# Patient Record
Sex: MALE | Race: WHITE | NOT HISPANIC OR LATINO | Employment: PART TIME | URBAN - METROPOLITAN AREA
[De-identification: names, ages, dates, MRNs, and addresses within clinical notes are randomized per-mention and may not be internally consistent; named-entity substitution may affect disease eponyms.]

---

## 2017-01-25 ENCOUNTER — OFFICE VISIT (OUTPATIENT)
Dept: NEUROSURGERY | Facility: CLINIC | Age: 77
End: 2017-01-25

## 2017-01-25 ENCOUNTER — TRANSCRIBE ORDERS (OUTPATIENT)
Dept: NEUROSURGERY | Facility: CLINIC | Age: 77
End: 2017-01-25

## 2017-01-25 VITALS
DIASTOLIC BLOOD PRESSURE: 65 MMHG | WEIGHT: 170 LBS | HEART RATE: 64 BPM | SYSTOLIC BLOOD PRESSURE: 113 MMHG | HEIGHT: 70 IN | BODY MASS INDEX: 24.34 KG/M2

## 2017-01-25 DIAGNOSIS — M50.320 DEGENERATION OF INTERVERTEBRAL DISC OF MID-CERVICAL REGION, UNSPECIFIED SPINAL LEVEL: ICD-10-CM

## 2017-01-25 DIAGNOSIS — M50.120 CERVICAL DISC DISORDER WITH RADICULOPATHY OF MID-CERVICAL REGION: Primary | ICD-10-CM

## 2017-01-25 PROCEDURE — 99214 OFFICE O/P EST MOD 30 MIN: CPT | Performed by: NEUROLOGICAL SURGERY

## 2017-01-25 NOTE — MR AVS SNAPSHOT
Urban Teixeira   1/25/2017 12:30 PM   Office Visit    Dept Phone:  [phone]   Encounter #:  [id number]    Provider:  Obed Youssef MD   Department:  [facility] NEUROSURGERY                Your Full Care Plan              Your Updated Medication List          This list is accurate as of: 1/25/17  3:32 PM.  Always use your most recent med list.                aspirin 81 MG EC tablet       atorvastatin 80 MG tablet   Commonly known as:  LIPITOR       carvedilol 12.5 MG tablet   Commonly known as:  COREG       FARXIGA 10 MG tablet   Generic drug:  Dapagliflozin Propanediol       gabapentin 300 MG capsule   Commonly known as:  NEURONTIN   Take 1 capsule by mouth 2 (two) times a day.       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 If you would like to sign up for [place], please email [email or web address] or call [phone] to obtain an activation code.             Other Info from Your Visit           Your Appointments     Feb  "13, 2017  8:00 AM EST   Pre-Admission Testing with TELLO COKER 3   [facility] PREADMISSION T ([place])    [address] [facility] [address] [phone]            Mar 06, 2017  9:30 AM EST   Post-Op with SARY Brink   [facility] (--)    [address]  [facility] [address] [phone]              Allergies     Sulfa Antibiotics        Reason for Visit     Neck Pain           Vital Signs     Blood Pressure Pulse Height Weight Body Mass Index Smoking Status    113/65 64 70\" (177.8 cm) 170 lb (77.1 kg) 24.39 kg/m2 Never Smoker      Problems and Diagnoses Noted     Disc disease of the neck    Degeneration of intervertebral disc of cervical region        "

## 2017-01-25 NOTE — LETTER
January 25, 2017     Rogelio Dyer MD  [address] [place] [address]    Patient: Urban Teixeira   YOB: 1940   Date of Visit: 1/25/2017       Dear Dr. Gomez MD:    Urban Teixeira was in my office today. Below are the relevant portions of my assessment and plan of care.      Independent Review of Radiographic Studies:    The myelogram done in August 2016 showed a osteophytic cervical disc disease at C4-C5 and C5-C6 with root entrapment.  I agree with the report.      Medical Decision Making:    I described and recommended an anterior cervical discectomy and fusion at C4/5 and C5/6 with a cage and plate. The goal of surgery is relief of radiating arm pain and improvement of numbness, tingling, and weakness. This will help reduce but may not eliminate midline neck pain. The risks include, but are not limited to, infection, hemorrhage requiring transfusion or reoperation, CSF leak requiring reoperation, incomplete relief of symptoms, difficulty swallowing, hoarseness of voice, hardware problems requiring revision surgery, potential need for additional surgery in the future, stroke, paralysis, coma, and death. The patient agrees to proceed.  He will need to come off of his aspirin.  He will need cardiac clearance.      Urban was seen today for neck pain.    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            If you have questions, please do not hesitate to call me. I look forward to following Urban along with you.         Sincerely,        Obed Youssef MD        CC: No Recipients

## 2017-01-25 NOTE — PROGRESS NOTES
Subjective   Patient ID: Urban Teixeira is a 76 y.o. male is here today for follow-up of neck pain.    At his last visit, it was recommended that the patient continue gabapentin 300 mg BID and continue his home exercise program.    Today, the patient notes that his neck pain symptoms have not improved.  He notes that he was evaluated and treated by a chiropractor and notes that his neck and left arm pain symptoms did not improve at all.  He notes the gabapentin helps minimally.  He notes that his pain is increased with prolonged sitting and at night.  He also notes increased weakness in his left hand and notes that it is even causing him problems eating.    He does note that Biofreeze does give him some relief of his pain symptoms.     Neck Pain    This is a chronic problem. The current episode started more than 1 month ago. The problem occurs daily. The problem has been gradually improving. Pertinent negatives include no fever.       The following portions of the patient's history were reviewed and updated as appropriate: allergies, current medications, past family history, past medical history, past social history, past surgical history and problem list.    Review of Systems   Constitutional: Negative for fever.   Musculoskeletal: Positive for neck pain.   Neurological: Negative for syncope.   All other systems reviewed and are negative.    We have been treating this gentleman over the last several years for neck pain and left arm pain.  He now has left deltoid weakness.  Blocks and physical therapy and gabapentin have not helped.  His hemoglobin A1c is better.  He does have a cardiac history but has been stable.  He has cervical osteophytic root impingement at C4-C5 and C5-C6.  The other levels don't look so bad.  We've exhausted all reasonable treatment is conservative.  He does have some neck pain but it's mostly the left shoulder arm and deltoid are bothering him.  Plus it is weak.  I discussed moving forward  with an ACDF at C4-C5 and C5-C6.  He will need cardiac clearance.      Objective   Physical Exam   Constitutional: He is oriented to person, place, and time. He appears well-developed and well-nourished.   HENT:   Head: Normocephalic and atraumatic.   Eyes: Conjunctivae and EOM are normal. Pupils are equal, round, and reactive to light.   Fundoscopic exam:       The right eye shows no papilledema. The right eye shows venous pulsations.        The left eye shows no papilledema. The left eye shows venous pulsations.   Neck: Carotid bruit is not present.   Neurological: He is oriented to person, place, and time. He has a normal Finger-Nose-Finger Test and a normal Heel to Shin Test. Gait normal.   Reflex Scores:       Tricep reflexes are 2+ on the right side and 2+ on the left side.       Bicep reflexes are 2+ on the right side and 2+ on the left side.       Brachioradialis reflexes are 2+ on the right side and 2+ on the left side.       Patellar reflexes are 2+ on the right side and 2+ on the left side.       Achilles reflexes are 2+ on the right side and 2+ on the left side.  Psychiatric: His speech is normal.     Neurologic Exam     Mental Status   Oriented to person, place, and time.   Registration of memory: Good recent and remote memory.   Attention: normal. Concentration: normal.   Speech: speech is normal   Level of consciousness: alert  Knowledge: consistent with education.     Cranial Nerves     CN II   Visual fields full to confrontation.   Visual acuity: normal    CN III, IV, VI   Pupils are equal, round, and reactive to light.  Extraocular motions are normal.     CN V   Facial sensation intact.   Right corneal reflex: normal  Left corneal reflex: normal    CN VII   Facial expression full, symmetric.   Right facial weakness: none  Left facial weakness: none    CN VIII   Hearing: intact    CN IX, X   Palate: symmetric    CN XI   Right sternocleidomastoid strength: normal  Left sternocleidomastoid strength:  normal    CN XII   Tongue: not atrophic  Tongue deviation: none    Motor Exam   Muscle bulk: normal  Right arm tone: normal  Left arm tone: normal  Right leg tone: normal  Left leg tone: normal    Strength   Strength 5/5 except as noted.   Left deltoid: 4/5    Sensory Exam   Light touch normal.     Gait, Coordination, and Reflexes     Gait  Gait: normal    Coordination   Finger to nose coordination: normal  Heel to shin coordination: normal    Reflexes   Right brachioradialis: 2+  Left brachioradialis: 2+  Right biceps: 2+  Left biceps: 2+  Right triceps: 2+  Left triceps: 2+  Right patellar: 2+  Left patellar: 2+  Right achilles: 2+  Left achilles: 2+  Right : 2+  Left : 2+      Assessment/Plan   Independent Review of Radiographic Studies:    The myelogram done in August 2016 showed a osteophytic cervical disc disease at C4-C5 and C5-C6 with root entrapment.  I agree with the report.      Medical Decision Making:    I described and recommended an anterior cervical discectomy and fusion at C4/5 and C5/6 with a cage and plate. The goal of surgery is relief of radiating arm pain and improvement of numbness, tingling, and weakness. This will help reduce but may not eliminate midline neck pain. The risks include, but are not limited to, infection, hemorrhage requiring transfusion or reoperation, CSF leak requiring reoperation, incomplete relief of symptoms, difficulty swallowing, hoarseness of voice, hardware problems requiring revision surgery, potential need for additional surgery in the future, stroke, paralysis, coma, and death. The patient agrees to proceed.  He will need to come off of his aspirin.  He will need cardiac clearance.      Urban was seen today for neck pain.    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

## 2017-02-13 ENCOUNTER — HOSPITAL ENCOUNTER (OUTPATIENT)
Dept: GENERAL RADIOLOGY | Facility: HOSPITAL | Age: 77
Discharge: HOME | End: 2017-02-13
Admitting: NEUROLOGICAL SURGERY

## 2017-02-13 ENCOUNTER — APPOINTMENT (OUTPATIENT)
Dept: PREADMISSION TESTING | Facility: HOSPITAL | Age: 77
End: 2017-02-13

## 2017-02-13 VITALS
WEIGHT: 173.7 LBS | DIASTOLIC BLOOD PRESSURE: 73 MMHG | SYSTOLIC BLOOD PRESSURE: 134 MMHG | RESPIRATION RATE: 16 BRPM | TEMPERATURE: 96.9 F | BODY MASS INDEX: 24.87 KG/M2 | HEIGHT: 70 IN | OXYGEN SATURATION: 99 % | HEART RATE: 63 BPM

## 2017-02-13 LAB
ANION GAP SERPL CALCULATED.3IONS-SCNC: 12 MMOL/L
APTT PPP: 28.6 SECONDS (ref 22.7–35.4)
BASOPHILS # BLD AUTO: 0.02 10*3/MM3 (ref 0–0.2)
BASOPHILS NFR BLD AUTO: 0.4 % (ref 0–1.5)
BILIRUB UR QL STRIP: NEGATIVE
BUN BLD-MCNC: 21 MG/DL (ref 8–23)
BUN/CREAT SERPL: 19.3 (ref 7–25)
CALCIUM SPEC-SCNC: 8.8 MG/DL (ref 8.6–10.5)
CHLORIDE SERPL-SCNC: 102 MMOL/L (ref 98–107)
CLARITY UR: CLEAR
CO2 SERPL-SCNC: 26 MMOL/L (ref 22–29)
COLOR UR: YELLOW
CREAT BLD-MCNC: 1.09 MG/DL (ref 0.76–1.27)
DEPRECATED RDW RBC AUTO: 47.9 FL (ref 37–54)
EOSINOPHIL # BLD AUTO: 0.11 10*3/MM3 (ref 0–0.7)
EOSINOPHIL NFR BLD AUTO: 2.2 % (ref 0.3–6.2)
ERYTHROCYTE [DISTWIDTH] IN BLOOD BY AUTOMATED COUNT: 14 % (ref 11.5–14.5)
GFR SERPL CREATININE-BSD FRML MDRD: 66 ML/MIN/1.73
GLUCOSE BLD-MCNC: 258 MG/DL (ref 65–99)
GLUCOSE UR STRIP-MCNC: ABNORMAL MG/DL
HCT VFR BLD AUTO: 43.8 % (ref 40.4–52.2)
HGB BLD-MCNC: 13.7 G/DL (ref 13.7–17.6)
HGB UR QL STRIP.AUTO: NEGATIVE
IMM GRANULOCYTES # BLD: 0 10*3/MM3 (ref 0–0.03)
IMM GRANULOCYTES NFR BLD: 0 % (ref 0–0.5)
INR PPP: 1.13 (ref 0.9–1.1)
KETONES UR QL STRIP: NEGATIVE
LEUKOCYTE ESTERASE UR QL STRIP.AUTO: NEGATIVE
LYMPHOCYTES # BLD AUTO: 1.25 10*3/MM3 (ref 0.9–4.8)
LYMPHOCYTES NFR BLD AUTO: 24.7 % (ref 19.6–45.3)
MCH RBC QN AUTO: 29.5 PG (ref 27–32.7)
MCHC RBC AUTO-ENTMCNC: 31.3 G/DL (ref 32.6–36.4)
MCV RBC AUTO: 94.4 FL (ref 79.8–96.2)
MONOCYTES # BLD AUTO: 0.33 10*3/MM3 (ref 0.2–1.2)
MONOCYTES NFR BLD AUTO: 6.5 % (ref 5–12)
NEUTROPHILS # BLD AUTO: 3.35 10*3/MM3 (ref 1.9–8.1)
NEUTROPHILS NFR BLD AUTO: 66.2 % (ref 42.7–76)
NITRITE UR QL STRIP: NEGATIVE
PH UR STRIP.AUTO: <=5 [PH] (ref 5–8)
PLATELET # BLD AUTO: 130 10*3/MM3 (ref 140–500)
PMV BLD AUTO: 9.9 FL (ref 6–12)
POTASSIUM BLD-SCNC: 4.2 MMOL/L (ref 3.5–5.2)
PROT UR QL STRIP: NEGATIVE
PROTHROMBIN TIME: 14.1 SECONDS (ref 11.7–14.2)
RBC # BLD AUTO: 4.64 10*6/MM3 (ref 4.6–6)
SODIUM BLD-SCNC: 140 MMOL/L (ref 136–145)
SP GR UR STRIP: >=1.03 (ref 1–1.03)
UROBILINOGEN UR QL STRIP: ABNORMAL
WBC NRBC COR # BLD: 5.06 10*3/MM3 (ref 4.5–10.7)

## 2017-02-13 PROCEDURE — 80048 BASIC METABOLIC PNL TOTAL CA: CPT | Performed by: NEUROLOGICAL SURGERY

## 2017-02-13 PROCEDURE — 36415 COLL VENOUS BLD VENIPUNCTURE: CPT

## 2017-02-13 PROCEDURE — 85610 PROTHROMBIN TIME: CPT | Performed by: NEUROLOGICAL SURGERY

## 2017-02-13 PROCEDURE — 71020 HC CHEST PA AND LATERAL: CPT

## 2017-02-13 PROCEDURE — 81003 URINALYSIS AUTO W/O SCOPE: CPT | Performed by: NEUROLOGICAL SURGERY

## 2017-02-13 PROCEDURE — 85730 THROMBOPLASTIN TIME PARTIAL: CPT | Performed by: NEUROLOGICAL SURGERY

## 2017-02-13 PROCEDURE — 85025 COMPLETE CBC W/AUTO DIFF WBC: CPT | Performed by: NEUROLOGICAL SURGERY

## 2017-02-13 NOTE — DISCHARGE INSTRUCTIONS
Take the following medications the morning of surgery with a small sip of water.  CARVEDILOL    ARRIVAL TIME 12:30   PLEASE CHECK WITH RADHA TO MAKE SURE THIS TIME IS  CORRECT      General Instructions:  • Do not eat or drink after midnight: includes water, mints, or gum. You may brush your teeth.  • Do not smoke, chew tobacco, or drink alcohol.  • Bring medications in original bottles, any inhalers and if applicable your C-PAP/ BI-PAP machine.  • Bring any papers given to you in the doctor’s office.  • Wear clean comfortable clothes and socks.  • Do not wear contact lenses or make-up.  Bring a case for your glasses if applicable.   • Bring crutches or walker if applicable.  • Leave all other valuables and jewelry at home.        Preventing a Surgical Site Infection:  Shower on the morning of surgery using a fresh bar of anti-bacterial soap (such as Dial) and clean washcloth.  Dry with a clean towel and dress in clean clothing.  For 2 to 3 days before surgery, avoid shaving with a razor near where you will have surgery because the razor can irritate skin and make it easier to develop an infection  Ask your surgeon if you will be receiving antibiotics prior to surgery  Make sure you, your family, and all healthcare providers clean their hands with soap and water or an alcohol based hand  before caring for you or your wound  If at all possible, quit smoking as many days before surgery as you can.    Day of surgery:  Upon arrival, a Pre-op nurse and Anesthesiologist will review your health history, obtain vital signs, and answer questions you may have.  The only belongings needed at this time will be your home medications and if applicable your C-PAP/BI-PAP machine.  If you are staying overnight your family can leave the rest of your belongings in the car and bring them to your room later.  A Pre-op nurse will start an IV and you may receive medication in preparation for surgery, including something to  help you relax.  Your family will be able to see you in the Pre-op area.  While you are in surgery your family should notify the waiting room  if they leave the waiting room area and provide a contact phone number.    Please be aware that surgery does come with discomfort.  We want to make every effort to control your discomfort so please discuss any uncontrolled symptoms with your nurse.   Your doctor will most likely have prescribed pain medications.      If you are going home after surgery you will receive individualized written care instructions before being discharged.  A responsible adult must drive you to and from the hospital on the day of your surgery and stay with you for 24 hours.    If you are staying overnight following surgery, you will be transported to your hospital room following the recovery period.  [facility] has all private rooms.    If you have any questions please call Pre-Admission Testing at [phone].  Deductibles and co-payments are collected on the day of service. Please be prepared to pay the required co-pay, deductible or deposit on the day of service as defined by your plan.

## 2017-02-21 ENCOUNTER — HOSPITAL ENCOUNTER (OUTPATIENT)
Facility: HOSPITAL | Age: 77
Setting detail: HOSPITAL OUTPATIENT SURGERY
Discharge: HOME | End: 2017-02-21
Attending: NEUROLOGICAL SURGERY | Admitting: NEUROLOGICAL SURGERY

## 2017-02-21 ENCOUNTER — ANESTHESIA EVENT (OUTPATIENT)
Dept: PERIOP | Facility: HOSPITAL | Age: 77
End: 2017-02-21

## 2017-02-21 ENCOUNTER — ANESTHESIA (OUTPATIENT)
Dept: PERIOP | Facility: HOSPITAL | Age: 77
End: 2017-02-21

## 2017-02-21 VITALS
BODY MASS INDEX: 24.44 KG/M2 | RESPIRATION RATE: 12 BRPM | DIASTOLIC BLOOD PRESSURE: 86 MMHG | WEIGHT: 170.7 LBS | TEMPERATURE: 97.6 F | OXYGEN SATURATION: 99 % | SYSTOLIC BLOOD PRESSURE: 148 MMHG | HEART RATE: 60 BPM | HEIGHT: 70 IN

## 2017-02-21 PROBLEM — M50.121 CERVICAL DISC DISORDER AT C4-C5 LEVEL WITH RADICULOPATHY: Status: ACTIVE | Noted: 2017-02-21

## 2017-02-21 LAB — GLUCOSE BLDC GLUCOMTR-MCNC: 108 MG/DL (ref 70–130)

## 2017-02-21 PROCEDURE — 25010000002 FENTANYL CITRATE (PF) 100 MCG/2ML SOLUTION: Performed by: ANESTHESIOLOGY

## 2017-02-21 PROCEDURE — 25010000002 MIDAZOLAM PER 1 MG: Performed by: ANESTHESIOLOGY

## 2017-02-21 PROCEDURE — 82962 GLUCOSE BLOOD TEST: CPT

## 2017-02-21 RX ORDER — FAMOTIDINE 10 MG/ML
20 INJECTION, SOLUTION INTRAVENOUS
Status: DISCONTINUED | OUTPATIENT
Start: 2017-02-21 | End: 2017-02-23 | Stop reason: HOSPADM

## 2017-02-21 RX ORDER — MIDAZOLAM HYDROCHLORIDE 1 MG/ML
2 INJECTION INTRAMUSCULAR; INTRAVENOUS
Status: DISCONTINUED | OUTPATIENT
Start: 2017-02-21 | End: 2017-02-23 | Stop reason: HOSPADM

## 2017-02-21 RX ORDER — SODIUM CHLORIDE 0.9 % (FLUSH) 0.9 %
1-10 SYRINGE (ML) INJECTION AS NEEDED
Status: DISCONTINUED | OUTPATIENT
Start: 2017-02-21 | End: 2017-02-23 | Stop reason: HOSPADM

## 2017-02-21 RX ORDER — SODIUM CHLORIDE, SODIUM LACTATE, POTASSIUM CHLORIDE, CALCIUM CHLORIDE 600; 310; 30; 20 MG/100ML; MG/100ML; MG/100ML; MG/100ML
9 INJECTION, SOLUTION INTRAVENOUS CONTINUOUS PRN
Status: DISCONTINUED | OUTPATIENT
Start: 2017-02-21 | End: 2017-02-23 | Stop reason: HOSPADM

## 2017-02-21 RX ORDER — HYDROCODONE BITARTRATE AND ACETAMINOPHEN 7.5; 325 MG/1; MG/1
1 TABLET ORAL EVERY 6 HOURS PRN
Qty: 40 TABLET | Refills: 0 | Status: SHIPPED | OUTPATIENT
Start: 2017-02-21 | End: 2017-03-06

## 2017-02-21 RX ORDER — MIDAZOLAM HYDROCHLORIDE 1 MG/ML
1 INJECTION INTRAMUSCULAR; INTRAVENOUS
Status: DISCONTINUED | OUTPATIENT
Start: 2017-02-21 | End: 2017-02-23 | Stop reason: HOSPADM

## 2017-02-21 RX ORDER — CEFAZOLIN SODIUM 2 G/100ML
2 INJECTION, SOLUTION INTRAVENOUS ONCE
Status: DISCONTINUED | OUTPATIENT
Start: 2017-02-21 | End: 2017-02-23 | Stop reason: HOSPADM

## 2017-02-21 RX ORDER — FENTANYL CITRATE 50 UG/ML
50 INJECTION, SOLUTION INTRAMUSCULAR; INTRAVENOUS
Status: DISCONTINUED | OUTPATIENT
Start: 2017-02-21 | End: 2017-02-23 | Stop reason: HOSPADM

## 2017-02-21 RX ADMIN — FAMOTIDINE 20 MG: 10 INJECTION, SOLUTION INTRAVENOUS at 16:35

## 2017-02-21 RX ADMIN — MIDAZOLAM 0.5 MG: 1 INJECTION INTRAMUSCULAR; INTRAVENOUS at 16:36

## 2017-02-21 RX ADMIN — FENTANYL CITRATE 50 MCG: 50 INJECTION INTRAMUSCULAR; INTRAVENOUS at 16:36

## 2017-02-21 RX ADMIN — MIDAZOLAM HYDROCHLORIDE 1 MG: 1 INJECTION, SOLUTION INTRAMUSCULAR; INTRAVENOUS at 16:51

## 2017-02-21 RX ADMIN — SODIUM CHLORIDE, POTASSIUM CHLORIDE, SODIUM LACTATE AND CALCIUM CHLORIDE 9 ML/HR: 600; 310; 30; 20 INJECTION, SOLUTION INTRAVENOUS at 16:35

## 2017-02-21 NOTE — DISCHARGE INSTRUCTIONS
Your surgery has been rescheduled for Friday, 2/24/17 at 3pm.  Dr. Youssef's office will contact you regarding surgery arrival time.

## 2017-02-21 NOTE — ANESTHESIA PREPROCEDURE EVALUATION
Anesthesia Evaluation     Patient summary reviewed and Nursing notes reviewed   NPO Status: > 8 hours   Airway   Mallampati: III  TM distance: >3 FB  Neck ROM: full  no difficulty expected  Dental - normal exam     Pulmonary - negative pulmonary ROS    breath sounds clear to auscultation  Cardiovascular     Rhythm: regular    (+) hypertension, past MI , CAD, CABG, cardiac stents   (-) angina      Neuro/Psych- negative ROS  GI/Hepatic/Renal/Endo    (+)  GERD, diabetes mellitus,     Musculoskeletal     (+) neck pain,   Abdominal    Substance History - negative use     OB/GYN negative ob/gyn ROS         Other   (+) arthritis                             Anesthesia Plan    ASA 3     general     intravenous induction   Anesthetic plan and risks discussed with patient.

## 2017-02-21 NOTE — PLAN OF CARE
Problem: Patient Care Overview (Adult)  Goal: Plan of Care Review  Outcome: Ongoing (interventions implemented as appropriate)    02/21/17 1445   Coping/Psychosocial Response Interventions   Plan Of Care Reviewed With patient   Patient Care Overview   Progress no change       Goal: Adult Individualization and Mutuality  Outcome: Ongoing (interventions implemented as appropriate)    02/21/17 1445   Individualization   Patient Specific Preferences call pt [place]   Patient Specific Goals no infection after surgery.   Patient Specific Interventions Teach good hand hygiene.       Goal: Discharge Needs Assessment  Outcome: Ongoing (interventions implemented as appropriate)    02/21/17 1445   Discharge Needs Assessment   Concerns To Be Addressed denies needs/concerns at this time   Current Health   Anticipated Changes Related to Illness none   Self-Care   Equipment Currently Used at Home none         Problem: Perioperative Period (Adult)  Goal: Signs and Symptoms of Listed Potential Problems Will be Absent or Manageable (Perioperative Period)  Outcome: Ongoing (interventions implemented as appropriate)    02/21/17 1445   Perioperative Period   Problems Assessed (Perioperative Period) pain   Problems Present (Perioperative Period) pain

## 2017-02-22 ENCOUNTER — TRANSCRIBE ORDERS (OUTPATIENT)
Dept: NEUROSURGERY | Facility: CLINIC | Age: 77
End: 2017-02-22

## 2017-02-22 DIAGNOSIS — M50.322 DEGENERATION OF INTERVERTEBRAL DISC AT C5-C6 LEVEL: ICD-10-CM

## 2017-02-22 DIAGNOSIS — M50.321 DEGENERATION OF INTERVERTEBRAL DISC AT C4-C5 LEVEL: ICD-10-CM

## 2017-02-22 DIAGNOSIS — M50.120 CERVICAL DISC DISORDER WITH RADICULOPATHY OF MID-CERVICAL REGION: Primary | ICD-10-CM

## 2017-02-24 ENCOUNTER — HOSPITAL ENCOUNTER (OUTPATIENT)
Facility: HOSPITAL | Age: 77
Discharge: HOME | End: 2017-02-25
Attending: NEUROLOGICAL SURGERY | Admitting: NEUROLOGICAL SURGERY

## 2017-02-24 ENCOUNTER — APPOINTMENT (OUTPATIENT)
Dept: GENERAL RADIOLOGY | Facility: HOSPITAL | Age: 77
End: 2017-02-24

## 2017-02-24 PROBLEM — M50.20 HERNIATED CERVICAL DISC: Status: ACTIVE | Noted: 2017-02-24

## 2017-02-24 LAB
GLUCOSE BLDC GLUCOMTR-MCNC: 109 MG/DL (ref 70–130)
GLUCOSE BLDC GLUCOMTR-MCNC: 90 MG/DL (ref 70–130)

## 2017-02-24 PROCEDURE — 25010000002 METHOCARBAMOL 1000 MG/10ML SOLUTION 10 ML VIAL: Performed by: NEUROLOGICAL SURGERY

## 2017-02-24 PROCEDURE — 25010000002 DEXAMETHASONE PER 1 MG: Performed by: NURSE ANESTHETIST, CERTIFIED REGISTERED

## 2017-02-24 PROCEDURE — 25010000002 PROPOFOL 10 MG/ML EMULSION: Performed by: NURSE ANESTHETIST, CERTIFIED REGISTERED

## 2017-02-24 PROCEDURE — 25010000002 FENTANYL CITRATE (PF) 100 MCG/2ML SOLUTION: Performed by: ANESTHESIOLOGY

## 2017-02-24 PROCEDURE — L0120 CERV FLEX N/ADJ FOAM PRE OTS: HCPCS | Performed by: NEUROLOGICAL SURGERY

## 2017-02-24 PROCEDURE — 25010000003 CEFAZOLIN IN DEXTROSE 2-4 GM/100ML-% SOLUTION: Performed by: NEUROLOGICAL SURGERY

## 2017-02-24 PROCEDURE — 25010000002 ONDANSETRON PER 1 MG: Performed by: NURSE ANESTHETIST, CERTIFIED REGISTERED

## 2017-02-24 PROCEDURE — 22551 ARTHRD ANT NTRBDY CERVICAL: CPT | Performed by: NEUROLOGICAL SURGERY

## 2017-02-24 PROCEDURE — 25010000002 NEOSTIGMINE 10 MG/10ML SOLUTION: Performed by: NURSE ANESTHETIST, CERTIFIED REGISTERED

## 2017-02-24 PROCEDURE — 82962 GLUCOSE BLOOD TEST: CPT

## 2017-02-24 PROCEDURE — 20930 SP BONE ALGRFT MORSEL ADD-ON: CPT | Performed by: NEUROLOGICAL SURGERY

## 2017-02-24 PROCEDURE — 94799 UNLISTED PULMONARY SVC/PX: CPT

## 2017-02-24 PROCEDURE — 25010000002 MIDAZOLAM PER 1 MG: Performed by: ANESTHESIOLOGY

## 2017-02-24 PROCEDURE — 22853 INSJ BIOMECHANICAL DEVICE: CPT | Performed by: NEUROLOGICAL SURGERY

## 2017-02-24 PROCEDURE — 25010000002 HYDROMORPHONE PER 4 MG: Performed by: NURSE ANESTHETIST, CERTIFIED REGISTERED

## 2017-02-24 PROCEDURE — 22845 INSERT SPINE FIXATION DEVICE: CPT | Performed by: NEUROLOGICAL SURGERY

## 2017-02-24 PROCEDURE — 22552 ARTHRD ANT NTRBD CERVICAL EA: CPT | Performed by: NEUROLOGICAL SURGERY

## 2017-02-24 PROCEDURE — 76000 FLUOROSCOPY <1 HR PHYS/QHP: CPT

## 2017-02-24 PROCEDURE — 72040 X-RAY EXAM NECK SPINE 2-3 VW: CPT

## 2017-02-24 PROCEDURE — C1713 ANCHOR/SCREW BN/BN,TIS/BN: HCPCS | Performed by: NEUROLOGICAL SURGERY

## 2017-02-24 PROCEDURE — 25810000003 POTASSIUM CHLORIDE PER 2 MEQ: Performed by: NEUROLOGICAL SURGERY

## 2017-02-24 DEVICE — SCREW 3125315 4.5 X 15 SELF TAP VAR
Type: IMPLANTABLE DEVICE | Status: FUNCTIONAL
Brand: ATLANTIS® ANTERIOR CERVICAL PLATE SYSTEM

## 2017-02-24 DEVICE — DBM T43102 1CC GRAFTON PUTTY
Type: IMPLANTABLE DEVICE | Status: FUNCTIONAL
Brand: GRAFTON®AND GRAFTON PLUS®DEMINERALIZED BONE MATRIX (DBM)

## 2017-02-24 DEVICE — SCREW 3120314 4.0 X 14 SELF TAP VAR
Type: IMPLANTABLE DEVICE | Status: FUNCTIONAL
Brand: ATLANTIS® ANTERIOR CERVICAL PLATE SYSTEM

## 2017-02-24 DEVICE — IMPLANT 6240641 ANATOMIC 14X11X6MM
Type: IMPLANTABLE DEVICE | Status: FUNCTIONAL
Brand: VERTE-STACK® SPINAL SYSTEM

## 2017-02-24 DEVICE — PLATE 7200045 ATL VISION ELITE 45MM
Type: IMPLANTABLE DEVICE | Status: FUNCTIONAL
Brand: ATLANTIS® ANTERIOR CERVICAL PLATE SYSTEM

## 2017-02-24 RX ORDER — NICOTINE POLACRILEX 4 MG
15 LOZENGE BUCCAL
Status: DISCONTINUED | OUTPATIENT
Start: 2017-02-24 | End: 2017-02-25 | Stop reason: HOSPADM

## 2017-02-24 RX ORDER — FENTANYL CITRATE 50 UG/ML
50 INJECTION, SOLUTION INTRAMUSCULAR; INTRAVENOUS
Status: DISCONTINUED | OUTPATIENT
Start: 2017-02-24 | End: 2017-02-24 | Stop reason: HOSPADM

## 2017-02-24 RX ORDER — FENTANYL CITRATE 50 UG/ML
25 INJECTION, SOLUTION INTRAMUSCULAR; INTRAVENOUS
Status: DISCONTINUED | OUTPATIENT
Start: 2017-02-24 | End: 2017-02-24 | Stop reason: HOSPADM

## 2017-02-24 RX ORDER — GABAPENTIN 300 MG/1
600 CAPSULE ORAL 2 TIMES DAILY
Status: DISCONTINUED | OUTPATIENT
Start: 2017-02-24 | End: 2017-02-25 | Stop reason: HOSPADM

## 2017-02-24 RX ORDER — MORPHINE SULFATE 2 MG/ML
2 INJECTION, SOLUTION INTRAMUSCULAR; INTRAVENOUS
Status: DISCONTINUED | OUTPATIENT
Start: 2017-02-24 | End: 2017-02-24 | Stop reason: HOSPADM

## 2017-02-24 RX ORDER — LIDOCAINE HYDROCHLORIDE 40 MG/ML
SOLUTION TOPICAL AS NEEDED
Status: DISCONTINUED | OUTPATIENT
Start: 2017-02-24 | End: 2017-02-24 | Stop reason: SURG

## 2017-02-24 RX ORDER — CEFAZOLIN SODIUM 2 G/100ML
2 INJECTION, SOLUTION INTRAVENOUS EVERY 8 HOURS
Status: DISPENSED | OUTPATIENT
Start: 2017-02-24 | End: 2017-02-25

## 2017-02-24 RX ORDER — CEFAZOLIN SODIUM 2 G/100ML
2 INJECTION, SOLUTION INTRAVENOUS ONCE
Status: COMPLETED | OUTPATIENT
Start: 2017-02-24 | End: 2017-02-24

## 2017-02-24 RX ORDER — MIDAZOLAM HYDROCHLORIDE 1 MG/ML
2 INJECTION INTRAMUSCULAR; INTRAVENOUS
Status: DISCONTINUED | OUTPATIENT
Start: 2017-02-24 | End: 2017-02-24 | Stop reason: HOSPADM

## 2017-02-24 RX ORDER — MIDAZOLAM HYDROCHLORIDE 1 MG/ML
1 INJECTION INTRAMUSCULAR; INTRAVENOUS
Status: DISCONTINUED | OUTPATIENT
Start: 2017-02-24 | End: 2017-02-24 | Stop reason: HOSPADM

## 2017-02-24 RX ORDER — SODIUM CHLORIDE 0.9 % (FLUSH) 0.9 %
1-10 SYRINGE (ML) INJECTION AS NEEDED
Status: DISCONTINUED | OUTPATIENT
Start: 2017-02-24 | End: 2017-02-24 | Stop reason: HOSPADM

## 2017-02-24 RX ORDER — METFORMIN HYDROCHLORIDE 500 MG/1
1000 TABLET, EXTENDED RELEASE ORAL 2 TIMES DAILY
Status: DISCONTINUED | OUTPATIENT
Start: 2017-02-24 | End: 2017-02-25 | Stop reason: HOSPADM

## 2017-02-24 RX ORDER — NEOSTIGMINE METHYLSULFATE 1 MG/ML
INJECTION, SOLUTION INTRAVENOUS AS NEEDED
Status: DISCONTINUED | OUTPATIENT
Start: 2017-02-24 | End: 2017-02-24 | Stop reason: SURG

## 2017-02-24 RX ORDER — ONDANSETRON 2 MG/ML
INJECTION INTRAMUSCULAR; INTRAVENOUS AS NEEDED
Status: DISCONTINUED | OUTPATIENT
Start: 2017-02-24 | End: 2017-02-24 | Stop reason: SURG

## 2017-02-24 RX ORDER — CYCLOBENZAPRINE HCL 10 MG
10 TABLET ORAL 3 TIMES DAILY PRN
Status: DISCONTINUED | OUTPATIENT
Start: 2017-02-24 | End: 2017-02-25 | Stop reason: HOSPADM

## 2017-02-24 RX ORDER — LABETALOL HYDROCHLORIDE 5 MG/ML
5 INJECTION, SOLUTION INTRAVENOUS
Status: DISCONTINUED | OUTPATIENT
Start: 2017-02-24 | End: 2017-02-24 | Stop reason: HOSPADM

## 2017-02-24 RX ORDER — HYDROMORPHONE HYDROCHLORIDE 1 MG/ML
0.5 INJECTION, SOLUTION INTRAMUSCULAR; INTRAVENOUS; SUBCUTANEOUS
Status: DISCONTINUED | OUTPATIENT
Start: 2017-02-24 | End: 2017-02-25 | Stop reason: HOSPADM

## 2017-02-24 RX ORDER — SENNA AND DOCUSATE SODIUM 50; 8.6 MG/1; MG/1
1 TABLET, FILM COATED ORAL NIGHTLY PRN
Status: DISCONTINUED | OUTPATIENT
Start: 2017-02-24 | End: 2017-02-25 | Stop reason: HOSPADM

## 2017-02-24 RX ORDER — NALOXONE HCL 0.4 MG/ML
0.4 VIAL (ML) INJECTION
Status: DISCONTINUED | OUTPATIENT
Start: 2017-02-24 | End: 2017-02-25 | Stop reason: HOSPADM

## 2017-02-24 RX ORDER — FAMOTIDINE 10 MG/ML
20 INJECTION, SOLUTION INTRAVENOUS
Status: DISCONTINUED | OUTPATIENT
Start: 2017-02-24 | End: 2017-02-24 | Stop reason: HOSPADM

## 2017-02-24 RX ORDER — DEXAMETHASONE SODIUM PHOSPHATE 10 MG/ML
INJECTION INTRAMUSCULAR; INTRAVENOUS AS NEEDED
Status: DISCONTINUED | OUTPATIENT
Start: 2017-02-24 | End: 2017-02-24 | Stop reason: SURG

## 2017-02-24 RX ORDER — MAGNESIUM HYDROXIDE 1200 MG/15ML
LIQUID ORAL AS NEEDED
Status: DISCONTINUED | OUTPATIENT
Start: 2017-02-24 | End: 2017-02-24 | Stop reason: HOSPADM

## 2017-02-24 RX ORDER — DOCUSATE SODIUM 100 MG/1
100 CAPSULE, LIQUID FILLED ORAL 2 TIMES DAILY PRN
Status: DISCONTINUED | OUTPATIENT
Start: 2017-02-24 | End: 2017-02-25 | Stop reason: HOSPADM

## 2017-02-24 RX ORDER — DEXTROSE MONOHYDRATE 25 G/50ML
25 INJECTION, SOLUTION INTRAVENOUS
Status: DISCONTINUED | OUTPATIENT
Start: 2017-02-24 | End: 2017-02-25 | Stop reason: HOSPADM

## 2017-02-24 RX ORDER — PROPOFOL 10 MG/ML
VIAL (ML) INTRAVENOUS AS NEEDED
Status: DISCONTINUED | OUTPATIENT
Start: 2017-02-24 | End: 2017-02-24 | Stop reason: SURG

## 2017-02-24 RX ORDER — CEFAZOLIN SODIUM 2 G/100ML
INJECTION, SOLUTION INTRAVENOUS
Status: DISPENSED
Start: 2017-02-24 | End: 2017-02-25

## 2017-02-24 RX ORDER — HYDROMORPHONE HCL 110MG/55ML
PATIENT CONTROLLED ANALGESIA SYRINGE INTRAVENOUS AS NEEDED
Status: DISCONTINUED | OUTPATIENT
Start: 2017-02-24 | End: 2017-02-24 | Stop reason: SURG

## 2017-02-24 RX ORDER — ONDANSETRON 2 MG/ML
4 INJECTION INTRAMUSCULAR; INTRAVENOUS ONCE AS NEEDED
Status: DISCONTINUED | OUTPATIENT
Start: 2017-02-24 | End: 2017-02-24 | Stop reason: HOSPADM

## 2017-02-24 RX ORDER — HYDRALAZINE HYDROCHLORIDE 20 MG/ML
5 INJECTION INTRAMUSCULAR; INTRAVENOUS
Status: DISCONTINUED | OUTPATIENT
Start: 2017-02-24 | End: 2017-02-24 | Stop reason: HOSPADM

## 2017-02-24 RX ORDER — HYDROCODONE BITARTRATE AND ACETAMINOPHEN 7.5; 325 MG/1; MG/1
2 TABLET ORAL EVERY 4 HOURS PRN
Status: DISCONTINUED | OUTPATIENT
Start: 2017-02-24 | End: 2017-02-25 | Stop reason: HOSPADM

## 2017-02-24 RX ORDER — ROCURONIUM BROMIDE 10 MG/ML
INJECTION, SOLUTION INTRAVENOUS AS NEEDED
Status: DISCONTINUED | OUTPATIENT
Start: 2017-02-24 | End: 2017-02-24 | Stop reason: SURG

## 2017-02-24 RX ORDER — SODIUM CHLORIDE, SODIUM LACTATE, POTASSIUM CHLORIDE, CALCIUM CHLORIDE 600; 310; 30; 20 MG/100ML; MG/100ML; MG/100ML; MG/100ML
9 INJECTION, SOLUTION INTRAVENOUS CONTINUOUS PRN
Status: DISCONTINUED | OUTPATIENT
Start: 2017-02-24 | End: 2017-02-24 | Stop reason: HOSPADM

## 2017-02-24 RX ORDER — ACETAMINOPHEN 325 MG/1
650 TABLET ORAL EVERY 4 HOURS PRN
Status: DISCONTINUED | OUTPATIENT
Start: 2017-02-24 | End: 2017-02-25 | Stop reason: HOSPADM

## 2017-02-24 RX ORDER — CARVEDILOL 12.5 MG/1
12.5 TABLET ORAL 2 TIMES DAILY WITH MEALS
Status: DISCONTINUED | OUTPATIENT
Start: 2017-02-24 | End: 2017-02-25 | Stop reason: HOSPADM

## 2017-02-24 RX ORDER — SODIUM CHLORIDE, SODIUM LACTATE, POTASSIUM CHLORIDE, CALCIUM CHLORIDE 600; 310; 30; 20 MG/100ML; MG/100ML; MG/100ML; MG/100ML
INJECTION, SOLUTION INTRAVENOUS CONTINUOUS PRN
Status: DISCONTINUED | OUTPATIENT
Start: 2017-02-24 | End: 2017-02-24 | Stop reason: SURG

## 2017-02-24 RX ORDER — ENALAPRILAT 2.5 MG/2ML
1.25 INJECTION INTRAVENOUS ONCE AS NEEDED
Status: DISCONTINUED | OUTPATIENT
Start: 2017-02-24 | End: 2017-02-24 | Stop reason: HOSPADM

## 2017-02-24 RX ORDER — ATORVASTATIN CALCIUM 80 MG/1
80 TABLET, FILM COATED ORAL NIGHTLY
Status: DISCONTINUED | OUTPATIENT
Start: 2017-02-24 | End: 2017-02-25 | Stop reason: HOSPADM

## 2017-02-24 RX ORDER — SODIUM CHLORIDE 0.9 % (FLUSH) 0.9 %
1-10 SYRINGE (ML) INJECTION AS NEEDED
Status: DISCONTINUED | OUTPATIENT
Start: 2017-02-24 | End: 2017-02-25 | Stop reason: HOSPADM

## 2017-02-24 RX ORDER — HYDROMORPHONE HYDROCHLORIDE 1 MG/ML
0.5 INJECTION, SOLUTION INTRAMUSCULAR; INTRAVENOUS; SUBCUTANEOUS
Status: DISCONTINUED | OUTPATIENT
Start: 2017-02-24 | End: 2017-02-24 | Stop reason: HOSPADM

## 2017-02-24 RX ORDER — ONDANSETRON 2 MG/ML
4 INJECTION INTRAMUSCULAR; INTRAVENOUS EVERY 6 HOURS PRN
Status: DISCONTINUED | OUTPATIENT
Start: 2017-02-24 | End: 2017-02-25 | Stop reason: HOSPADM

## 2017-02-24 RX ORDER — TEMAZEPAM 15 MG/1
15 CAPSULE ORAL NIGHTLY PRN
Status: DISCONTINUED | OUTPATIENT
Start: 2017-02-24 | End: 2017-02-25 | Stop reason: HOSPADM

## 2017-02-24 RX ORDER — LIDOCAINE HYDROCHLORIDE 20 MG/ML
INJECTION, SOLUTION INFILTRATION; PERINEURAL AS NEEDED
Status: DISCONTINUED | OUTPATIENT
Start: 2017-02-24 | End: 2017-02-24 | Stop reason: SURG

## 2017-02-24 RX ORDER — SODIUM CHLORIDE AND POTASSIUM CHLORIDE 150; 450 MG/100ML; MG/100ML
100 INJECTION, SOLUTION INTRAVENOUS CONTINUOUS
Status: DISCONTINUED | OUTPATIENT
Start: 2017-02-24 | End: 2017-02-25 | Stop reason: HOSPADM

## 2017-02-24 RX ORDER — GLYCOPYRROLATE 0.2 MG/ML
INJECTION INTRAMUSCULAR; INTRAVENOUS AS NEEDED
Status: DISCONTINUED | OUTPATIENT
Start: 2017-02-24 | End: 2017-02-24 | Stop reason: SURG

## 2017-02-24 RX ORDER — ASPIRIN 81 MG/1
81 TABLET ORAL DAILY
COMMUNITY
End: 2017-02-25 | Stop reason: HOSPADM

## 2017-02-24 RX ADMIN — CEFAZOLIN SODIUM 2 G: 2 INJECTION, SOLUTION INTRAVENOUS at 16:58

## 2017-02-24 RX ADMIN — MIDAZOLAM HYDROCHLORIDE 2 MG: 1 INJECTION, SOLUTION INTRAMUSCULAR; INTRAVENOUS at 16:58

## 2017-02-24 RX ADMIN — FENTANYL CITRATE 50 MCG: 50 INJECTION INTRAMUSCULAR; INTRAVENOUS at 17:35

## 2017-02-24 RX ADMIN — SODIUM CHLORIDE, POTASSIUM CHLORIDE, SODIUM LACTATE AND CALCIUM CHLORIDE: 600; 310; 30; 20 INJECTION, SOLUTION INTRAVENOUS at 18:05

## 2017-02-24 RX ADMIN — POTASSIUM CHLORIDE AND SODIUM CHLORIDE 100 ML/HR: 450; 150 INJECTION, SOLUTION INTRAVENOUS at 23:59

## 2017-02-24 RX ADMIN — LIDOCAINE HYDROCHLORIDE 1 EACH: 40 SPRAY LARYNGEAL; TRANSTRACHEAL at 17:04

## 2017-02-24 RX ADMIN — LIDOCAINE HYDROCHLORIDE 100 MG: 20 INJECTION, SOLUTION INFILTRATION; PERINEURAL at 17:04

## 2017-02-24 RX ADMIN — EPHEDRINE SULFATE 10 MG: 50 INJECTION INTRAMUSCULAR; INTRAVENOUS; SUBCUTANEOUS at 17:19

## 2017-02-24 RX ADMIN — DEXAMETHASONE SODIUM PHOSPHATE 10 MG: 10 INJECTION INTRAMUSCULAR; INTRAVENOUS at 17:25

## 2017-02-24 RX ADMIN — ONDANSETRON 4 MG: 2 INJECTION INTRAMUSCULAR; INTRAVENOUS at 18:46

## 2017-02-24 RX ADMIN — HYDROMORPHONE HYDROCHLORIDE 0.5 MG: 1 INJECTION, SOLUTION INTRAMUSCULAR; INTRAVENOUS; SUBCUTANEOUS at 20:00

## 2017-02-24 RX ADMIN — HYDROMORPHONE HYDROCHLORIDE 0.5 MG: 2 INJECTION, SOLUTION INTRAMUSCULAR; INTRAVENOUS; SUBCUTANEOUS at 17:40

## 2017-02-24 RX ADMIN — GLYCOPYRROLATE 0.4 MG: 0.2 INJECTION INTRAMUSCULAR; INTRAVENOUS at 18:49

## 2017-02-24 RX ADMIN — FENTANYL CITRATE 50 MCG: 50 INJECTION INTRAMUSCULAR; INTRAVENOUS at 16:03

## 2017-02-24 RX ADMIN — MIDAZOLAM 1 MG: 1 INJECTION INTRAMUSCULAR; INTRAVENOUS at 13:43

## 2017-02-24 RX ADMIN — EPHEDRINE SULFATE 10 MG: 50 INJECTION INTRAMUSCULAR; INTRAVENOUS; SUBCUTANEOUS at 17:21

## 2017-02-24 RX ADMIN — HYDROMORPHONE HYDROCHLORIDE 0.5 MG: 1 INJECTION, SOLUTION INTRAMUSCULAR; INTRAVENOUS; SUBCUTANEOUS at 19:50

## 2017-02-24 RX ADMIN — MIDAZOLAM 1 MG: 1 INJECTION INTRAMUSCULAR; INTRAVENOUS at 14:49

## 2017-02-24 RX ADMIN — FENTANYL CITRATE 50 MCG: 50 INJECTION INTRAMUSCULAR; INTRAVENOUS at 18:51

## 2017-02-24 RX ADMIN — SODIUM CHLORIDE, POTASSIUM CHLORIDE, SODIUM LACTATE AND CALCIUM CHLORIDE 9 ML/HR: 600; 310; 30; 20 INJECTION, SOLUTION INTRAVENOUS at 13:15

## 2017-02-24 RX ADMIN — FAMOTIDINE 20 MG: 10 INJECTION, SOLUTION INTRAVENOUS at 13:43

## 2017-02-24 RX ADMIN — SODIUM CHLORIDE, POTASSIUM CHLORIDE, SODIUM LACTATE AND CALCIUM CHLORIDE: 600; 310; 30; 20 INJECTION, SOLUTION INTRAVENOUS at 15:06

## 2017-02-24 RX ADMIN — FENTANYL CITRATE 50 MCG: 50 INJECTION INTRAMUSCULAR; INTRAVENOUS at 17:04

## 2017-02-24 RX ADMIN — FENTANYL CITRATE 50 MCG: 50 INJECTION INTRAMUSCULAR; INTRAVENOUS at 17:29

## 2017-02-24 RX ADMIN — ROCURONIUM BROMIDE 40 MG: 10 INJECTION INTRAVENOUS at 17:04

## 2017-02-24 RX ADMIN — PROPOFOL 150 MG: 10 INJECTION, EMULSION INTRAVENOUS at 17:04

## 2017-02-24 RX ADMIN — FENTANYL CITRATE 25 MCG: 50 INJECTION INTRAMUSCULAR; INTRAVENOUS at 14:14

## 2017-02-24 RX ADMIN — METHOCARBAMOL 1000 MG: 100 INJECTION, SOLUTION INTRAMUSCULAR; INTRAVENOUS at 19:31

## 2017-02-24 RX ADMIN — NEOSTIGMINE METHYLSULFATE 3 MG: 1 INJECTION INTRAVENOUS at 18:49

## 2017-02-24 RX ADMIN — MIDAZOLAM 0.5 MG: 1 INJECTION INTRAMUSCULAR; INTRAVENOUS at 16:03

## 2017-02-24 NOTE — ANESTHESIA PROCEDURE NOTES
Airway  Urgency: elective    Date/Time: 2/24/2017 5:06 PM  Airway not difficult    General Information and Staff    Patient location during procedure: OR  Anesthesiologist: AMAURY MEDINA  CRNA: ROSALVA ERICKSON    Indications and Patient Condition  Indications for airway management: airway protection    Preoxygenated: yes  MILS not maintained throughout  Mask difficulty assessment: 1 - vent by mask    Final Airway Details  Final airway type: endotracheal airway      Successful airway: ETT  Cuffed: yes   Successful intubation technique: direct laryngoscopy  Facilitating devices/methods: cricoid pressure  Endotracheal tube insertion site: oral  Blade: Daron  Blade size: #3  ETT size: 7.5 mm  Cormack-Lehane Classification: grade I - full view of glottis  Placement verified by: chest auscultation   Measured from: lips  ETT to lips (cm): 22  Number of attempts at approach: 1    Additional Comments  Pre O2, SIAI

## 2017-02-25 VITALS
TEMPERATURE: 97.4 F | WEIGHT: 170.31 LBS | OXYGEN SATURATION: 99 % | HEIGHT: 70 IN | DIASTOLIC BLOOD PRESSURE: 81 MMHG | BODY MASS INDEX: 24.38 KG/M2 | RESPIRATION RATE: 18 BRPM | SYSTOLIC BLOOD PRESSURE: 136 MMHG | HEART RATE: 73 BPM

## 2017-02-25 PROBLEM — I10 HYPERTENSION: Status: ACTIVE | Noted: 2017-02-25

## 2017-02-25 PROBLEM — I21.9 MYOCARDIAL INFARCTION (HCC): Status: ACTIVE | Noted: 2017-02-25

## 2017-02-25 PROBLEM — I25.10 CORONARY ARTERY DISEASE: Status: ACTIVE | Noted: 2017-02-25

## 2017-02-25 PROBLEM — E11.65 TYPE 2 DIABETES MELLITUS WITH HYPERGLYCEMIA (HCC): Status: ACTIVE | Noted: 2017-02-25

## 2017-02-25 LAB
ANION GAP SERPL CALCULATED.3IONS-SCNC: 20.3 MMOL/L
BASOPHILS # BLD AUTO: 0 10*3/MM3 (ref 0–0.2)
BASOPHILS NFR BLD AUTO: 0 % (ref 0–1.5)
BUN BLD-MCNC: 24 MG/DL (ref 8–23)
BUN/CREAT SERPL: 20.3 (ref 7–25)
CALCIUM SPEC-SCNC: 9 MG/DL (ref 8.6–10.5)
CHLORIDE SERPL-SCNC: 96 MMOL/L (ref 98–107)
CO2 SERPL-SCNC: 20.7 MMOL/L (ref 22–29)
CREAT BLD-MCNC: 1.18 MG/DL (ref 0.76–1.27)
DEPRECATED RDW RBC AUTO: 49.1 FL (ref 37–54)
EOSINOPHIL # BLD AUTO: 0.01 10*3/MM3 (ref 0–0.7)
EOSINOPHIL NFR BLD AUTO: 0.1 % (ref 0.3–6.2)
ERYTHROCYTE [DISTWIDTH] IN BLOOD BY AUTOMATED COUNT: 14 % (ref 11.5–14.5)
GFR SERPL CREATININE-BSD FRML MDRD: 60 ML/MIN/1.73
GLUCOSE BLD-MCNC: 188 MG/DL (ref 65–99)
HBA1C MFR BLD: 7.23 % (ref 4.8–5.6)
HCT VFR BLD AUTO: 46.8 % (ref 40.4–52.2)
HGB BLD-MCNC: 14.9 G/DL (ref 13.7–17.6)
IMM GRANULOCYTES # BLD: 0.03 10*3/MM3 (ref 0–0.03)
IMM GRANULOCYTES NFR BLD: 0.3 % (ref 0–0.5)
LYMPHOCYTES # BLD AUTO: 0.58 10*3/MM3 (ref 0.9–4.8)
LYMPHOCYTES NFR BLD AUTO: 6.5 % (ref 19.6–45.3)
MCH RBC QN AUTO: 30.5 PG (ref 27–32.7)
MCHC RBC AUTO-ENTMCNC: 31.8 G/DL (ref 32.6–36.4)
MCV RBC AUTO: 95.7 FL (ref 79.8–96.2)
MONOCYTES # BLD AUTO: 0.3 10*3/MM3 (ref 0.2–1.2)
MONOCYTES NFR BLD AUTO: 3.3 % (ref 5–12)
NEUTROPHILS # BLD AUTO: 8.07 10*3/MM3 (ref 1.9–8.1)
NEUTROPHILS NFR BLD AUTO: 89.8 % (ref 42.7–76)
PLATELET # BLD AUTO: 125 10*3/MM3 (ref 140–500)
PMV BLD AUTO: 10.1 FL (ref 6–12)
POTASSIUM BLD-SCNC: 4.8 MMOL/L (ref 3.5–5.2)
RBC # BLD AUTO: 4.89 10*6/MM3 (ref 4.6–6)
SODIUM BLD-SCNC: 137 MMOL/L (ref 136–145)
WBC NRBC COR # BLD: 8.99 10*3/MM3 (ref 4.5–10.7)

## 2017-02-25 PROCEDURE — 99024 POSTOP FOLLOW-UP VISIT: CPT | Performed by: NEUROLOGICAL SURGERY

## 2017-02-25 PROCEDURE — 94799 UNLISTED PULMONARY SVC/PX: CPT

## 2017-02-25 PROCEDURE — 83036 HEMOGLOBIN GLYCOSYLATED A1C: CPT | Performed by: HOSPITALIST

## 2017-02-25 PROCEDURE — 25010000003 CEFAZOLIN IN DEXTROSE 2-4 GM/100ML-% SOLUTION: Performed by: NEUROLOGICAL SURGERY

## 2017-02-25 PROCEDURE — 85025 COMPLETE CBC W/AUTO DIFF WBC: CPT | Performed by: NEUROLOGICAL SURGERY

## 2017-02-25 PROCEDURE — 80048 BASIC METABOLIC PNL TOTAL CA: CPT | Performed by: NEUROLOGICAL SURGERY

## 2017-02-25 RX ORDER — HYDROCODONE BITARTRATE AND ACETAMINOPHEN 7.5; 325 MG/1; MG/1
2 TABLET ORAL EVERY 4 HOURS PRN
Qty: 45 TABLET | Refills: 0 | Status: SHIPPED | OUTPATIENT
Start: 2017-02-25 | End: 2017-03-06

## 2017-02-25 RX ORDER — DOCUSATE SODIUM 250 MG
250 CAPSULE ORAL 2 TIMES DAILY PRN
Qty: 30 CAPSULE | Refills: 0 | Status: SHIPPED | OUTPATIENT
Start: 2017-02-25 | End: 2017-03-06

## 2017-02-25 RX ADMIN — TEMAZEPAM 15 MG: 15 CAPSULE ORAL at 00:28

## 2017-02-25 RX ADMIN — HYDROCODONE BITARTRATE AND ACETAMINOPHEN 2 TABLET: 7.5; 325 TABLET ORAL at 00:30

## 2017-02-25 RX ADMIN — CEFAZOLIN SODIUM 2 G: 2 INJECTION, SOLUTION INTRAVENOUS at 00:00

## 2017-02-25 NOTE — ANESTHESIA POSTPROCEDURE EVALUATION
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 [M50.120]; Degeneration of intervertebral disc at C4-C5 level [M50.321]; Degeneration of intervertebral disc at C5-C6 level [M50.322]) MD Elijah Herrera MD          Anesthesia Type: general  Last vitals  /62 (02/24/17 2000)    Temp      Pulse 70 (02/24/17 2000)   Resp 18 (02/24/17 2000)    SpO2 99 % (02/24/17 2000)      Post Anesthesia Care and Evaluation    Patient location during evaluation: PACU  Patient participation: complete - patient participated  Level of consciousness: awake and alert  Pain management: adequate  Airway patency: patent  Anesthetic complications: No anesthetic complications    Cardiovascular status: acceptable  Respiratory status: acceptable  Hydration status: acceptable

## 2017-03-01 ENCOUNTER — TELEPHONE (OUTPATIENT)
Dept: NEUROSURGERY | Facility: CLINIC | Age: 77
End: 2017-03-01

## 2017-03-01 NOTE — TELEPHONE ENCOUNTER
How are you feeling?  Doing very well, he is taking pain medication but is afraid of becoming addicted to it.  I told him that it is ok to take pain medication as long as he is having pain.  As his pain starts to subside he can take Tylenol for mild discomfort.  He says that the pain medication seems to help.  He said that he has pain when he is lying in his bed trying to sleep.  I told him to try sleeping in a recliner.  He is still having some pain in his arm.  I told him that was normal and he should begin to see a difference in a few weeks.  He is wearing his collar all the time.  Usually does a lot of walking,he walked a couple miles today.    Are you having any pain?  Some    Rate pain from 1-10  2-3/10, pain meds take the pain away.  Having some discomfort in his back.    Do you feel better than before surgery?  He thinks he is some better.    Are you taking the prescribed pain medicine?  Yes     Do you feel like it's helping?  Yes     Surgical dressing/dermabond?  Removed, some steri strips still on.    Surgery site-red, swollen, drainage?  Looks good    Any other problems? (ie:  Nausea or constipation)  He had some constipation but he is taking stool softeners and that is helping.      Questions regarding Post-op/ discharge instructions?  No     Any other questions?  No     Confirm post op appointment  Yes

## 2017-03-06 ENCOUNTER — OFFICE VISIT (OUTPATIENT)
Dept: NEUROSURGERY | Facility: CLINIC | Age: 77
End: 2017-03-06

## 2017-03-06 VITALS
DIASTOLIC BLOOD PRESSURE: 70 MMHG | WEIGHT: 170 LBS | BODY MASS INDEX: 24.34 KG/M2 | HEART RATE: 71 BPM | SYSTOLIC BLOOD PRESSURE: 117 MMHG | HEIGHT: 70 IN

## 2017-03-06 DIAGNOSIS — Z09 FOLLOW-UP EXAMINATION FOLLOWING SURGERY: Primary | ICD-10-CM

## 2017-03-06 PROCEDURE — 99024 POSTOP FOLLOW-UP VISIT: CPT | Performed by: NURSE PRACTITIONER

## 2017-03-06 RX ORDER — GABAPENTIN 300 MG/1
900 CAPSULE ORAL 2 TIMES DAILY
Qty: 180 CAPSULE | Refills: 3 | Status: SHIPPED | OUTPATIENT
Start: 2017-03-06 | End: 2017-07-24

## 2017-03-06 NOTE — PROGRESS NOTES
Subjective   Patient ID: Urban Teixeira is a 76 y.o. male is here today for follow-up. He had a C4/5, C5/6 ACDF on 2/24/17 performed by Dr. Youssef. Patient states that he has been doing well but still has some arm pain. He does state that his pain is better than before surgery. He denies any incisional problems. His incision looks clean and dry. He is currently taking Norco 7.5-325 mg PRN for pain.    History of Present Illness        Review of Systems   Constitutional: Positive for activity change.   Musculoskeletal: Negative for neck stiffness.   Neurological: Negative for weakness and numbness.   All other systems reviewed and are negative.      Objective   Physical Exam   Constitutional: He appears well-developed. No distress.   Skin: Skin is warm and dry.   The anterior cervical incision is well approximated. No redness, swelling or drainage.  The patient is wearing his soft cervical collar.   Psychiatric: He has a normal mood and affect.     Neurologic Exam    Assessment/Plan     Medical Decision Making:       Mr. Teixeira is a little over one week out from ACDF at C4-5 and C5-6 with Dr. Youssef.  He is still having some arm pain.  I did give some reassurance and instructed Mr. Teixeira that it will take time for those nerve symptoms to improve.    Mr. Teixeira was hoping that he could go back to where sitting but I instructed him to stay off work and we will discuss this when he follows up in the office in 3 weeks.  If he has any worsening symptoms in the meantime, he will call the office.    There are no diagnoses linked to this encounter.  Return in about 3 weeks (around 3/27/2017).

## 2017-03-13 ENCOUNTER — TELEPHONE (OUTPATIENT)
Dept: NEUROSURGERY | Facility: CLINIC | Age: 77
End: 2017-03-13

## 2017-03-13 NOTE — TELEPHONE ENCOUNTER
Patient wants to know if its ok for him to stop the neck exercises and the 3 pound weight lifting? He states that it has aggravated his neck and arm so much that he has had to restart his Norco. He is still taking the Naproxen during the day but is now taking a Norco at night. He would like to know if there is something else less strenuous for him to do? Please advise.

## 2017-03-14 NOTE — TELEPHONE ENCOUNTER
I have spoken with the patient and he states that he has been walking. He will slowly try the weight lifting when he thinks he's up to it.

## 2017-03-16 ENCOUNTER — TELEPHONE (OUTPATIENT)
Dept: NEUROSURGERY | Facility: CLINIC | Age: 77
End: 2017-03-16

## 2017-03-16 NOTE — TELEPHONE ENCOUNTER
Per [place], the patient may drive as long as it has been 3 weeks since surgery, he is not taking any pain medication, and he can comfortably turn his head to look over his shoulders. I have explained this to the patient and he expressed his understanding.

## 2017-03-28 ENCOUNTER — OFFICE VISIT (OUTPATIENT)
Dept: NEUROSURGERY | Facility: CLINIC | Age: 77
End: 2017-03-28

## 2017-03-28 VITALS
HEIGHT: 70 IN | SYSTOLIC BLOOD PRESSURE: 109 MMHG | DIASTOLIC BLOOD PRESSURE: 70 MMHG | HEART RATE: 68 BPM | WEIGHT: 170 LBS | BODY MASS INDEX: 24.34 KG/M2

## 2017-03-28 DIAGNOSIS — Z09 SURGICAL FOLLOWUP VISIT: Primary | ICD-10-CM

## 2017-03-28 PROCEDURE — 99024 POSTOP FOLLOW-UP VISIT: CPT | Performed by: NURSE PRACTITIONER

## 2017-03-28 RX ORDER — GABAPENTIN 300 MG/1
600 CAPSULE ORAL 2 TIMES DAILY
Qty: 90 CAPSULE | Refills: 2 | Status: SHIPPED | OUTPATIENT
Start: 2017-03-28 | End: 2017-05-31

## 2017-03-28 NOTE — PROGRESS NOTES
Subjective   Patient ID: Urban Teixeira is a 76 y.o. male is here today for follow-up. He had a C4/5, C5/6 ACDF on 2/24/17 performed by Dr. Youssef. Patient states that he has been doing well but still has some occasional arm pain. He has noticed that the tremor in his hand is better and he feels like he is gaining the strength back in his arm. He does state that his pain is better than before surgery. He is currently taking Ibuprofen OTC PRN for pain.    History of Present Illness    The following portions of the patient's history were reviewed and updated as appropriate: allergies, current medications, past family history, past medical history, past social history, past surgical history and problem list.    Review of Systems   Constitutional: Positive for activity change.   Musculoskeletal: Positive for neck pain (slight intermittent discomfort on left side of neck). Negative for neck stiffness.   Neurological: Negative for numbness.   All other systems reviewed and are negative.      Objective   Physical Exam   Constitutional: He is oriented to person, place, and time. He appears well-developed. No distress.   Neurological: He is alert and oriented to person, place, and time. He has normal strength. GCS eye subscore is 4. GCS verbal subscore is 5. GCS motor subscore is 6.   Skin: Skin is warm and dry.   Anterior cervical incision is well approximated. No redness, swelling or drainage.    Psychiatric: He has a normal mood and affect.   Vitals reviewed.    Neurologic Exam     Mental Status   Oriented to person, place, and time.     Motor Exam     Strength   Strength 5/5 throughout.       Assessment/Plan       Medical Decision Making:      Mr. Teixeira is now nearly 5 weeks out from C4-5, C5-6 ACDF with Dr. Youssef.  He continues to improve.  He is having some mild neck pain but it is manageable.  He is also still continuing to have some left shoulder and arm pain but it is not at all as severe as it was before  surgery.  He is currently taking 900 mg gabapentin per day - 600 mg in am; 300 mg in pm.  He is not taking any narcotic medications. I will increase the dose of gabapentin to 600 mg BID. Physical therapy was ordered. Mr. Teixeira will call if he has any worsening symptoms otherwise he will return to the office in 2 months.       Urban was seen today for post-op.    Diagnoses and all orders for this visit:    Surgical followup visit  -     Ambulatory Referral to Physical Therapy Evaluate and treat (for neck/core/arm strengthening  with modalities and intro to [facility])    Other orders  -     gabapentin (NEURONTIN) 300 MG capsule; Take 2 capsules by mouth 2 (Two) Times a Day.      Return in about 2 months (around 5/28/2017).

## 2017-05-31 ENCOUNTER — OFFICE VISIT (OUTPATIENT)
Dept: NEUROSURGERY | Facility: CLINIC | Age: 77
End: 2017-05-31

## 2017-05-31 VITALS
HEIGHT: 70 IN | SYSTOLIC BLOOD PRESSURE: 105 MMHG | DIASTOLIC BLOOD PRESSURE: 68 MMHG | WEIGHT: 170 LBS | BODY MASS INDEX: 24.34 KG/M2 | HEART RATE: 60 BPM

## 2017-05-31 DIAGNOSIS — M50.30 DISC DISEASE, DEGENERATIVE, CERVICAL: ICD-10-CM

## 2017-05-31 DIAGNOSIS — M25.512 PAIN OF LEFT SHOULDER JOINT ON MOVEMENT: Primary | ICD-10-CM

## 2017-05-31 PROCEDURE — 99212 OFFICE O/P EST SF 10 MIN: CPT | Performed by: NURSE PRACTITIONER

## 2017-07-24 ENCOUNTER — OFFICE VISIT (OUTPATIENT)
Dept: NEUROSURGERY | Facility: CLINIC | Age: 77
End: 2017-07-24

## 2017-07-24 VITALS
WEIGHT: 170 LBS | DIASTOLIC BLOOD PRESSURE: 71 MMHG | HEART RATE: 69 BPM | SYSTOLIC BLOOD PRESSURE: 118 MMHG | BODY MASS INDEX: 24.34 KG/M2 | HEIGHT: 70 IN

## 2017-07-24 DIAGNOSIS — M50.320 DEGENERATION OF INTERVERTEBRAL DISC OF MID-CERVICAL REGION, UNSPECIFIED SPINAL LEVEL: Primary | ICD-10-CM

## 2017-07-24 PROCEDURE — 99212 OFFICE O/P EST SF 10 MIN: CPT | Performed by: NURSE PRACTITIONER

## 2017-07-24 NOTE — PROGRESS NOTES
Subjective   Patient ID: Urban Teixeira is a 76 y.o. male is here today for follow-up on left arm pain. He had a C4/5, C5/6 ACDF on 2/24/17 performed by Dr. Youssef. Patient has been to see Dr. Andre Ernandez, who ordered physical therapy twice a week. He states that he can't tell a difference in the pain. He states that the pain is worse at night. He is not currently taking any pain medication.    HPI Comments: Mr. Teixeira is here for follow-up for persistent left shoulder pain.  He is now about 6 months out from a C4-5, C5-6 ACDF with Dr. Youssef.  He did see Dr. Andre Ernandez regarding the left shoulder.  He is currently undergoing some physical therapy and electrical stimulation.  He feels that his symptoms are slightly better although he is still having pain with motion in the left shoulder.    Arm Pain    There was no injury mechanism. The pain is present in the left shoulder and upper left arm. The quality of the pain is described as aching. The pain does not radiate. The pain is at a severity of 0/10. The patient is experiencing no pain (worse at night). The pain has been intermittent since the incident. Associated symptoms include muscle weakness (left shoulder). He has tried NSAIDs and elevation (anti-inflammatory, Biofreeze, PT) for the symptoms. The treatment provided moderate relief.       The following portions of the patient's history were reviewed and updated as appropriate: allergies, current medications, past family history, past medical history, past social history, past surgical history and problem list.    Review of Systems   Neurological: Positive for weakness (left arm).   All other systems reviewed and are negative.      Objective   Physical Exam   Constitutional: He is oriented to person, place, and time. He appears well-developed and well-nourished. He is cooperative. No distress.   HENT:   Head: Normocephalic and atraumatic.   Eyes: Conjunctivae are normal.   Neck: Normal range of motion. Neck  supple.   Cardiovascular: Normal rate.    Pulmonary/Chest: Effort normal. No respiratory distress.   Abdominal: Soft. He exhibits no distension. There is no tenderness.   Musculoskeletal: Normal range of motion. He exhibits tenderness (with palpation in the left shoulder joint). He exhibits no edema.   Neurological: He is alert and oriented to person, place, and time. He has normal reflexes. He displays normal reflexes. No sensory deficit. He exhibits normal muscle tone. Coordination and gait normal. GCS eye subscore is 4. GCS verbal subscore is 5. GCS motor subscore is 6.   Reflex Scores:       Tricep reflexes are 2+ on the right side and 2+ on the left side.       Bicep reflexes are 2+ on the right side and 2+ on the left side.       Brachioradialis reflexes are 2+ on the right side and 2+ on the left side.       Patellar reflexes are 2+ on the right side and 2+ on the left side.       Achilles reflexes are 2+ on the right side and 2+ on the left side.  Negative Quiroga's; negative clonus   Skin: Skin is warm and dry. No rash noted. He is not diaphoretic.   Psychiatric: He has a normal mood and affect. His speech is normal. Thought content normal.   Vitals reviewed.    Neurologic Exam     Mental Status   Oriented to person, place, and time.   Speech: speech is normal   Level of consciousness: alert    Motor Exam   Muscle bulk: normal  Overall muscle tone: normal    Strength   Strength 5/5 except as noted.   Left strength: Left deltoid strength is 5-/5.    Gait, Coordination, and Reflexes     Reflexes   Right brachioradialis: 2+  Left brachioradialis: 2+  Right biceps: 2+  Left biceps: 2+  Right triceps: 2+  Left triceps: 2+  Right patellar: 2+  Left patellar: 2+  Right achilles: 2+  Left achilles: 2+      Assessment/Plan   Independent Review of Radiographic Studies:      No new radiographic images to review.    Medical Decision Making:      Mr. Teixeira is doing fairly well since his cervical surgery.  He has been  bothered by persistent left shoulder pain.  He was evaluated by orthopedics and is currently undergoing physical therapy.  He is feeling a bit better.    Mr. Teixeira was offered a follow-up appointment but preferred to just call us if his symptoms do not improve.  He will continue working with orthopedics with regards to the left shoulder.  We will see him as needed from here.    Urban was seen today for arm pain.    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

## 2019-04-22 ENCOUNTER — OFFICE (OUTPATIENT)
Dept: URBAN - METROPOLITAN AREA CLINIC 75 | Facility: CLINIC | Age: 79
End: 2019-04-22

## 2019-04-22 VITALS
HEIGHT: 70 IN | DIASTOLIC BLOOD PRESSURE: 62 MMHG | WEIGHT: 158 LBS | SYSTOLIC BLOOD PRESSURE: 162 MMHG | HEART RATE: 97 BPM

## 2019-04-22 DIAGNOSIS — R19.7 DIARRHEA, UNSPECIFIED: ICD-10-CM

## 2019-04-22 DIAGNOSIS — R10.30 LOWER ABDOMINAL PAIN, UNSPECIFIED: ICD-10-CM

## 2019-04-22 DIAGNOSIS — R19.4 CHANGE IN BOWEL HABIT: ICD-10-CM

## 2019-04-22 PROCEDURE — 99204 OFFICE O/P NEW MOD 45 MIN: CPT

## 2019-06-27 VITALS
OXYGEN SATURATION: 98 % | RESPIRATION RATE: 16 BRPM | HEART RATE: 126 BPM | HEART RATE: 101 BPM | DIASTOLIC BLOOD PRESSURE: 81 MMHG | OXYGEN SATURATION: 99 % | TEMPERATURE: 97.9 F | HEIGHT: 70 IN | DIASTOLIC BLOOD PRESSURE: 63 MMHG | SYSTOLIC BLOOD PRESSURE: 90 MMHG | SYSTOLIC BLOOD PRESSURE: 103 MMHG | TEMPERATURE: 97.5 F | WEIGHT: 151 LBS | HEART RATE: 93 BPM | SYSTOLIC BLOOD PRESSURE: 132 MMHG | DIASTOLIC BLOOD PRESSURE: 52 MMHG | RESPIRATION RATE: 20 BRPM

## 2019-06-28 ENCOUNTER — OFFICE (OUTPATIENT)
Dept: URBAN - METROPOLITAN AREA PATHOLOGY 4 | Facility: PATHOLOGY | Age: 79
End: 2019-06-28
Payer: COMMERCIAL

## 2019-06-28 ENCOUNTER — AMBULATORY SURGICAL CENTER (OUTPATIENT)
Dept: URBAN - METROPOLITAN AREA SURGERY 17 | Facility: SURGERY | Age: 79
End: 2019-06-28
Payer: COMMERCIAL

## 2019-06-28 DIAGNOSIS — R19.7 DIARRHEA, UNSPECIFIED: ICD-10-CM

## 2019-06-28 DIAGNOSIS — K57.30 DIVERTICULOSIS OF LARGE INTESTINE WITHOUT PERFORATION OR ABS: ICD-10-CM

## 2019-06-28 DIAGNOSIS — R19.4 CHANGE IN BOWEL HABIT: ICD-10-CM

## 2019-06-28 DIAGNOSIS — R10.30 LOWER ABDOMINAL PAIN, UNSPECIFIED: ICD-10-CM

## 2019-06-28 DIAGNOSIS — K52.9 NONINFECTIVE GASTROENTERITIS AND COLITIS, UNSPECIFIED: ICD-10-CM

## 2019-06-28 LAB
GI HISTOLOGY: A. UNSPECIFIED: (no result)
GI HISTOLOGY: B. UNSPECIFIED: (no result)
GI HISTOLOGY: PDF REPORT: (no result)

## 2019-06-28 PROCEDURE — 45380 COLONOSCOPY AND BIOPSY: CPT | Performed by: INTERNAL MEDICINE

## 2019-06-28 PROCEDURE — 88305 TISSUE EXAM BY PATHOLOGIST: CPT | Performed by: INTERNAL MEDICINE

## 2019-07-19 ENCOUNTER — OFFICE (OUTPATIENT)
Dept: URBAN - METROPOLITAN AREA CLINIC 75 | Facility: CLINIC | Age: 79
End: 2019-07-19

## 2019-07-19 VITALS
WEIGHT: 160 LBS | HEART RATE: 83 BPM | DIASTOLIC BLOOD PRESSURE: 80 MMHG | SYSTOLIC BLOOD PRESSURE: 130 MMHG | HEIGHT: 70 IN

## 2019-07-19 DIAGNOSIS — R19.7 DIARRHEA, UNSPECIFIED: ICD-10-CM

## 2019-07-19 PROCEDURE — 99213 OFFICE O/P EST LOW 20 MIN: CPT

## 2019-11-26 ENCOUNTER — OFFICE VISIT (OUTPATIENT)
Dept: INTERNAL MEDICINE | Facility: CLINIC | Age: 79
End: 2019-11-26

## 2019-11-26 VITALS
SYSTOLIC BLOOD PRESSURE: 126 MMHG | WEIGHT: 159.8 LBS | HEART RATE: 71 BPM | OXYGEN SATURATION: 98 % | DIASTOLIC BLOOD PRESSURE: 78 MMHG | HEIGHT: 70 IN | TEMPERATURE: 98.3 F | BODY MASS INDEX: 22.88 KG/M2

## 2019-11-26 DIAGNOSIS — I25.10 CORONARY ARTERY DISEASE INVOLVING NATIVE HEART WITHOUT ANGINA PECTORIS, UNSPECIFIED VESSEL OR LESION TYPE: ICD-10-CM

## 2019-11-26 DIAGNOSIS — I15.9 SECONDARY HYPERTENSION: ICD-10-CM

## 2019-11-26 DIAGNOSIS — I48.91 NEW ONSET ATRIAL FIBRILLATION (HCC): Primary | ICD-10-CM

## 2019-11-26 DIAGNOSIS — E78.5 HYPERLIPIDEMIA, UNSPECIFIED HYPERLIPIDEMIA TYPE: ICD-10-CM

## 2019-11-26 DIAGNOSIS — E11.65 TYPE 2 DIABETES MELLITUS WITH HYPERGLYCEMIA, WITHOUT LONG-TERM CURRENT USE OF INSULIN (HCC): ICD-10-CM

## 2019-11-26 PROBLEM — E11.9: Status: RESOLVED | Noted: 2019-01-08 | Resolved: 2019-11-26

## 2019-11-26 PROBLEM — Z95.1 S/P CABG X 3: Status: ACTIVE | Noted: 2019-11-26

## 2019-11-26 PROBLEM — G51.0 BELL PALSY: Status: ACTIVE | Noted: 2019-11-26

## 2019-11-26 PROBLEM — M50.121 CERVICAL DISC DISORDER AT C4-C5 LEVEL WITH RADICULOPATHY: Status: RESOLVED | Noted: 2017-02-21 | Resolved: 2019-11-26

## 2019-11-26 PROBLEM — H35.30 MACULAR DEGENERATION: Status: ACTIVE | Noted: 2019-11-26

## 2019-11-26 PROBLEM — Z98.61 S/P PTCA (PERCUTANEOUS TRANSLUMINAL CORONARY ANGIOPLASTY): Status: ACTIVE | Noted: 2017-01-26

## 2019-11-26 PROBLEM — E11.9: Status: ACTIVE | Noted: 2019-01-08

## 2019-11-26 PROCEDURE — 99214 OFFICE O/P EST MOD 30 MIN: CPT | Performed by: INTERNAL MEDICINE

## 2019-11-26 RX ORDER — APIXABAN 5 MG/1
5 TABLET, FILM COATED ORAL 2 TIMES DAILY
Refills: 3 | COMMUNITY
Start: 2019-10-05 | End: 2020-02-25 | Stop reason: SDUPTHER

## 2019-11-26 RX ORDER — LANCETS
1 EACH MISCELLANEOUS DAILY
Refills: 11 | COMMUNITY
Start: 2019-08-19 | End: 2020-01-20 | Stop reason: SDUPTHER

## 2019-11-26 RX ORDER — BLOOD SUGAR DIAGNOSTIC
1 STRIP MISCELLANEOUS DAILY
Refills: 11 | COMMUNITY
Start: 2019-11-04 | End: 2020-01-20 | Stop reason: SDUPTHER

## 2019-11-26 NOTE — PROGRESS NOTES
Subjective   Urban Teixeira is a 79 y.o. male here today to establish with PCP.  Pt would like meds refilled.    History of Present Illness   He has been walking 4-5 miles a day  Pt has been taking BP meds as prescribed without any problems.  No HA  No episodes of orthostasis  Pt has been taking cholesterol meds as prescribed.  No difficulties with myalgias.   Pt has been compliant with diabetes meds. No side effects from medication No episodes of hypoglycemia. Patient denies any polyuria or polydipsia    The following portions of the patient's history were reviewed and updated as appropriate: allergies, current medications, past medical history, past social history and problem list.  He does walk most days    Review of Systems   All other systems reviewed and are negative.      Objective   Physical Exam   Constitutional: He is oriented to person, place, and time. He appears well-developed and well-nourished.   HENT:   Head: Normocephalic and atraumatic.   Right Ear: External ear normal.   Left Ear: External ear normal.   Mouth/Throat: Oropharynx is clear and moist.   Eyes: Conjunctivae and EOM are normal. Pupils are equal, round, and reactive to light.   Neck: Normal range of motion. No tracheal deviation present. No thyromegaly present.   Cardiovascular: Normal rate, regular rhythm, normal heart sounds and intact distal pulses.   Pulmonary/Chest: Effort normal and breath sounds normal.   Abdominal: Soft. Bowel sounds are normal. He exhibits no distension. There is no tenderness.   Musculoskeletal: Normal range of motion. He exhibits no edema or deformity.   Neurological: He is alert and oriented to person, place, and time.   Skin: Skin is warm and dry.   Psychiatric: He has a normal mood and affect. His behavior is normal. Judgment and thought content normal.   Vitals reviewed.      Vitals:    11/26/19 1339   BP: 126/78   Pulse: 71   Temp: 98.3 °F (36.8 °C)   SpO2: 98%     Body mass index is 22.93 kg/m².        Current Outpatient Medications:   •  ACCU-CHEK JUAN DAVID PLUS test strip, 1 each by Other route Daily. use to test blood sugar once daily, Disp: , Rfl: 11  •  ACCU-CHEK SOFTCLIX LANCETS lancets, 1 each Daily., Disp: , Rfl: 11  •  Acetaminophen (TYLENOL ARTHRITIS PAIN PO), Take 1 tablet by mouth 2 (Two) Times a Day., Disp: , Rfl:   •  atorvastatin (LIPITOR) 80 MG tablet, Take 80 mg by mouth Every Night., Disp: , Rfl:   •  carvedilol (COREG) 12.5 MG tablet, Take 12.5 mg by mouth 2 (Two) Times a Day With Meals., Disp: , Rfl:   •  ELIQUIS 5 MG tablet tablet, Take 5 mg by mouth 2 (Two) Times a Day., Disp: , Rfl: 3  •  FARXIGA 10 MG tablet, Take 10 mg by mouth Daily., Disp: , Rfl:   •  linagliptin (TRADJENTA) 5 MG tablet tablet, Take 5 mg by mouth daily., Disp: , Rfl:   •  metFORMIN XR (GLUCOPHATE-XR) 500 MG 24 hr tablet, Take 1,000 mg by mouth 2 (Two) Times a Day., Disp: , Rfl:   •  Multiple Vitamins-Minerals (PRESERVISION AREDS) capsule, Take 1 tablet by mouth 2 (Two) Times a Day., Disp: , Rfl:       Assessment/Plan   Diagnoses and all orders for this visit:    New onset atrial fibrillation (CMS/HCC)    Secondary hypertension    Coronary artery disease involving native heart without angina pectoris, unspecified vessel or lesion type    Hyperlipidemia, unspecified hyperlipidemia type    Type 2 diabetes mellitus with hyperglycemia, without long-term current use of insulin (CMS/HCC)  -     Hemoglobin A1c  -     Comprehensive Metabolic Panel      1.  HPL-  Ok with atorvastatin  2.  CAD-  No sx  He does take coreh   3.  AF-  No sx oneloquis  4.  HTN- ok with coreg  5. DM-  Ok with current meds  Recheck a1c today    Cont to work on diet and exercise

## 2019-11-27 LAB
ALBUMIN SERPL-MCNC: 4.7 G/DL (ref 3.5–4.8)
ALBUMIN/GLOB SERPL: 2.8 {RATIO} (ref 1.2–2.2)
ALP SERPL-CCNC: 93 IU/L (ref 39–117)
ALT SERPL-CCNC: 18 IU/L (ref 0–44)
AST SERPL-CCNC: 18 IU/L (ref 0–40)
BILIRUB SERPL-MCNC: 0.6 MG/DL (ref 0–1.2)
BUN SERPL-MCNC: 16 MG/DL (ref 8–27)
BUN/CREAT SERPL: 16 (ref 10–24)
CALCIUM SERPL-MCNC: 9.3 MG/DL (ref 8.6–10.2)
CHLORIDE SERPL-SCNC: 101 MMOL/L (ref 96–106)
CO2 SERPL-SCNC: 25 MMOL/L (ref 20–29)
CREAT SERPL-MCNC: 1.02 MG/DL (ref 0.76–1.27)
GLOBULIN SER CALC-MCNC: 1.7 G/DL (ref 1.5–4.5)
GLUCOSE SERPL-MCNC: 254 MG/DL (ref 65–99)
HBA1C MFR BLD: 7.9 % (ref 4.8–5.6)
POTASSIUM SERPL-SCNC: 4.8 MMOL/L (ref 3.5–5.2)
PROT SERPL-MCNC: 6.4 G/DL (ref 6–8.5)
SODIUM SERPL-SCNC: 144 MMOL/L (ref 134–144)

## 2020-01-20 RX ORDER — BLOOD SUGAR DIAGNOSTIC
1 STRIP MISCELLANEOUS DAILY
Qty: 100 EACH | Refills: 11 | Status: SHIPPED | OUTPATIENT
Start: 2020-01-20 | End: 2020-01-21 | Stop reason: SDUPTHER

## 2020-01-20 RX ORDER — LANCETS
1 EACH MISCELLANEOUS DAILY
Qty: 100 EACH | Refills: 11 | Status: SHIPPED | OUTPATIENT
Start: 2020-01-20 | End: 2020-01-21 | Stop reason: SDUPTHER

## 2020-01-21 RX ORDER — LANCETS
1 EACH MISCELLANEOUS DAILY
Qty: 100 EACH | Refills: 11 | Status: SHIPPED | OUTPATIENT
Start: 2020-01-21 | End: 2020-02-25

## 2020-01-21 RX ORDER — BLOOD SUGAR DIAGNOSTIC
1 STRIP MISCELLANEOUS DAILY
Qty: 100 EACH | Refills: 11 | Status: SHIPPED | OUTPATIENT
Start: 2020-01-21 | End: 2020-02-25

## 2020-02-17 DIAGNOSIS — E11.65 TYPE 2 DIABETES MELLITUS WITH HYPERGLYCEMIA, WITHOUT LONG-TERM CURRENT USE OF INSULIN (HCC): ICD-10-CM

## 2020-02-17 DIAGNOSIS — I48.91 NEW ONSET ATRIAL FIBRILLATION (HCC): ICD-10-CM

## 2020-02-17 DIAGNOSIS — I15.9 SECONDARY HYPERTENSION: ICD-10-CM

## 2020-02-19 LAB
ALBUMIN SERPL-MCNC: 4.4 G/DL (ref 3.5–5.2)
ALBUMIN/CREAT UR: 14 MG/G CREAT (ref 0–29)
ALBUMIN/GLOB SERPL: 2.2 G/DL
ALP SERPL-CCNC: 81 U/L (ref 39–117)
ALT SERPL-CCNC: 23 U/L (ref 1–41)
AST SERPL-CCNC: 17 U/L (ref 1–40)
BILIRUB SERPL-MCNC: 0.7 MG/DL (ref 0.2–1.2)
BUN SERPL-MCNC: 17 MG/DL (ref 8–23)
BUN/CREAT SERPL: 16.7 (ref 7–25)
CALCIUM SERPL-MCNC: 9.1 MG/DL (ref 8.6–10.5)
CHLORIDE SERPL-SCNC: 104 MMOL/L (ref 98–107)
CHOLEST SERPL-MCNC: 107 MG/DL (ref 0–200)
CO2 SERPL-SCNC: 28 MMOL/L (ref 22–29)
CREAT SERPL-MCNC: 1.02 MG/DL (ref 0.76–1.27)
CREAT UR-MCNC: 64.2 MG/DL
GLOBULIN SER CALC-MCNC: 2 GM/DL
GLUCOSE SERPL-MCNC: 148 MG/DL (ref 65–99)
HBA1C MFR BLD: 9.2 % (ref 4.8–5.6)
HDLC SERPL-MCNC: 40 MG/DL (ref 40–60)
LDLC SERPL CALC-MCNC: 51 MG/DL (ref 0–100)
LDLC/HDLC SERPL: 1.29 {RATIO}
MICROALBUMIN UR-MCNC: 9.1 UG/ML
POTASSIUM SERPL-SCNC: 4.2 MMOL/L (ref 3.5–5.2)
PROT SERPL-MCNC: 6.4 G/DL (ref 6–8.5)
SODIUM SERPL-SCNC: 143 MMOL/L (ref 136–145)
TRIGL SERPL-MCNC: 78 MG/DL (ref 0–150)
TSH SERPL DL<=0.005 MIU/L-ACNC: 2.24 UIU/ML (ref 0.27–4.2)
VLDLC SERPL CALC-MCNC: 15.6 MG/DL

## 2020-02-25 ENCOUNTER — OFFICE VISIT (OUTPATIENT)
Dept: INTERNAL MEDICINE | Facility: CLINIC | Age: 80
End: 2020-02-25

## 2020-02-25 VITALS
WEIGHT: 154.2 LBS | HEIGHT: 70 IN | HEART RATE: 73 BPM | OXYGEN SATURATION: 99 % | BODY MASS INDEX: 22.07 KG/M2 | TEMPERATURE: 97.9 F | DIASTOLIC BLOOD PRESSURE: 72 MMHG | SYSTOLIC BLOOD PRESSURE: 128 MMHG

## 2020-02-25 DIAGNOSIS — E78.5 HYPERLIPIDEMIA, UNSPECIFIED HYPERLIPIDEMIA TYPE: ICD-10-CM

## 2020-02-25 DIAGNOSIS — E11.65 TYPE 2 DIABETES MELLITUS WITH HYPERGLYCEMIA, WITHOUT LONG-TERM CURRENT USE OF INSULIN (HCC): ICD-10-CM

## 2020-02-25 DIAGNOSIS — I21.A1 TYPE 2 MYOCARDIAL INFARCTION (HCC): Primary | ICD-10-CM

## 2020-02-25 DIAGNOSIS — I48.0 PAROXYSMAL ATRIAL FIBRILLATION (HCC): ICD-10-CM

## 2020-02-25 PROCEDURE — G0438 PPPS, INITIAL VISIT: HCPCS | Performed by: INTERNAL MEDICINE

## 2020-02-25 PROCEDURE — 99214 OFFICE O/P EST MOD 30 MIN: CPT | Performed by: INTERNAL MEDICINE

## 2020-02-25 RX ORDER — METFORMIN HYDROCHLORIDE 500 MG/1
1000 TABLET, EXTENDED RELEASE ORAL 2 TIMES DAILY
Qty: 360 TABLET | Refills: 3 | Status: SHIPPED | OUTPATIENT
Start: 2020-02-25 | End: 2021-02-12

## 2020-02-25 RX ORDER — APIXABAN 5 MG/1
5 TABLET, FILM COATED ORAL 2 TIMES DAILY
Qty: 180 TABLET | Refills: 3 | Status: SHIPPED | OUTPATIENT
Start: 2020-02-25 | End: 2021-04-13

## 2020-02-25 RX ORDER — ATORVASTATIN CALCIUM 80 MG/1
80 TABLET, FILM COATED ORAL NIGHTLY
Qty: 90 TABLET | Refills: 3 | Status: SHIPPED | OUTPATIENT
Start: 2020-02-25 | End: 2021-03-10

## 2020-02-25 RX ORDER — LANCING DEVICE
1 EACH MISCELLANEOUS ONCE
Qty: 1 KIT | Refills: 0 | Status: SHIPPED | OUTPATIENT
Start: 2020-02-25 | End: 2020-03-30

## 2020-02-25 RX ORDER — CARVEDILOL 12.5 MG/1
12.5 TABLET ORAL 2 TIMES DAILY WITH MEALS
Qty: 180 TABLET | Refills: 3 | Status: SHIPPED | OUTPATIENT
Start: 2020-02-25 | End: 2021-02-22

## 2020-02-25 RX ORDER — LANCING DEVICE
1 EACH MISCELLANEOUS 2 TIMES DAILY
Qty: 200 EACH | Refills: 3 | Status: SHIPPED | OUTPATIENT
Start: 2020-02-25 | End: 2022-06-28 | Stop reason: SDUPTHER

## 2020-02-25 RX ORDER — DAPAGLIFLOZIN 10 MG/1
10 TABLET, FILM COATED ORAL DAILY
Qty: 90 TABLET | Refills: 3 | Status: SHIPPED | OUTPATIENT
Start: 2020-02-25 | End: 2021-02-12

## 2020-02-25 NOTE — PROGRESS NOTES
The ABCs of the Annual Wellness Visit  Initial Medicare Wellness Visit    No chief complaint on file.      Subjective   History of Present Illness:  Urban Teixeira is a 79 y.o. male who presents for an Initial Medicare Wellness Visit.    HEALTH RISK ASSESSMENT    Recent Hospitalizations:  No hospitalization(s) within the last year.    Current Medical Providers:  Patient Care Team:  Carmela Sweet MD as PCP - General (Internal Medicine)    Smoking Status:  Social History     Tobacco Use   Smoking Status Never Smoker   Smokeless Tobacco Never Used       Alcohol Consumption:  Social History     Substance and Sexual Activity   Alcohol Use No       Depression Screen:   PHQ-2/PHQ-9 Depression Screening 2/25/2020   Little interest or pleasure in doing things 0   Feeling down, depressed, or hopeless 0   Trouble falling or staying asleep, or sleeping too much 3   Feeling tired or having little energy 1   Poor appetite or overeating 0   Feeling bad about yourself - or that you are a failure or have let yourself or your family down 0   Trouble concentrating on things, such as reading the newspaper or watching television 0   Moving or speaking so slowly that other people could have noticed. Or the opposite - being so fidgety or restless that you have been moving around a lot more than usual 0   Thoughts that you would be better off dead, or of hurting yourself in some way 0   Total Score 4   If you checked off any problems, how difficult have these problems made it for you to do your work, take care of things at home, or get along with other people? Not difficult at all       Fall Risk Screen:  STEADI Fall Risk Assessment was completed, and patient is at LOW risk for falls.Assessment completed on:2/25/2020    Health Habits and Functional and Cognitive Screening:  Functional & Cognitive Status 2/25/2020   Do you have difficulty preparing food and eating? No   Do you have difficulty bathing yourself, getting dressed or grooming  yourself? No   Do you have difficulty using the toilet? No   Do you have difficulty moving around from place to place? No   Do you have trouble with steps or getting out of a bed or a chair? No   Current Diet Low Fat Diet   Dental Exam Up to date   Eye Exam Up to date   Exercise (times per week) 7 times per week   Current Exercise Activities Include Walking   Do you need help using the phone?  No   Are you deaf or do you have serious difficulty hearing?  No   Do you need help with transportation? No   Do you need help shopping? No   Do you need help preparing meals?  No   Do you need help with housework?  No   Do you need help with laundry? No   Do you need help taking your medications? No   Do you need help managing money? No   Do you ever drive or ride in a car without wearing a seat belt? No   Have you felt unusual stress, anger or loneliness in the last month? No   Who do you live with? Spouse   If you need help, do you have trouble finding someone available to you? No   Have you been bothered in the last four weeks by sexual problems? No   Do you have difficulty concentrating, remembering or making decisions? No         Does the patient have evidence of cognitive impairment? No    Asprin use counseling:Does not need ASA (and currently is not on it)    Age-appropriate Screening Schedule:  Refer to the list below for future screening recommendations based on patient's age, sex and/or medical conditions. Orders for these recommended tests are listed in the plan section. The patient has been provided with a written plan.    Health Maintenance   Topic Date Due   • ZOSTER VACCINE (1 of 2) 11/18/1991   • HEMOGLOBIN A1C  08/18/2020   • DIABETIC EYE EXAM  10/15/2020   • LIPID PANEL  02/18/2021   • URINE MICROALBUMIN  02/18/2021   • TDAP/TD VACCINES (2 - Td) 01/01/2024   • COLONOSCOPY  02/18/2029   • INFLUENZA VACCINE  Completed          The following portions of the patient's history were reviewed and updated as  appropriate: allergies, current medications, past family history, past medical history, past social history, past surgical history and problem list.    Outpatient Medications Prior to Visit   Medication Sig Dispense Refill   • ACCU-CHEK JUAN DAVID PLUS test strip 1 each by Other route Daily. use to test blood sugar once daily 100 each 11   • ACCU-CHEK SOFTCLIX LANCETS lancets 1 each by Other route Daily. DX CODE E11.65 100 each 11   • Acetaminophen (TYLENOL ARTHRITIS PAIN PO) Take 1 tablet by mouth 2 (Two) Times a Day.     • atorvastatin (LIPITOR) 80 MG tablet Take 80 mg by mouth Every Night.     • carvedilol (COREG) 12.5 MG tablet Take 12.5 mg by mouth 2 (Two) Times a Day With Meals.     • ELIQUIS 5 MG tablet tablet Take 5 mg by mouth 2 (Two) Times a Day.  3   • FARXIGA 10 MG tablet Take 10 mg by mouth Daily.     • linagliptin (TRADJENTA) 5 MG tablet tablet Take 5 mg by mouth daily.     • metFORMIN XR (GLUCOPHATE-XR) 500 MG 24 hr tablet Take 1,000 mg by mouth 2 (Two) Times a Day.     • Multiple Vitamins-Minerals (PRESERVISION AREDS) capsule Take 1 tablet by mouth 2 (Two) Times a Day.       No facility-administered medications prior to visit.        Patient Active Problem List   Diagnosis   • Bladder retention   • Hernia, inguinal, right   • Cervical disc disorder with radiculopathy of mid-cervical region   • Degeneration of intervertebral disc of mid-cervical region   • Herniated cervical disc   • Coronary artery disease   • Hypertension   • Type 2 diabetes mellitus with hyperglycemia (CMS/HCC)   • Myocardial infarction (old)   • Macular degeneration   • Hyperlipidemia   • S/P CABG x 3   • S/P PTCA (percutaneous transluminal coronary angioplasty)   • Paroxysmal atrial fibrillation (CMS/HCC)   • Bell palsy       Advanced Care Planning:  ACP discussion was held with the patient during this visit. Patient has an advance directive, copy requested.    Review of Systems    Compared to one year ago, the patient feels his  "physical health is the same.  Compared to one year ago, the patient feels his mental health is the same.    Reviewed chart for potential of high risk medication in the elderly: yes  Reviewed chart for potential of harmful drug interactions in the elderly:yes    Objective         Vitals:    02/25/20 1349   BP: 128/72   BP Location: Left arm   Patient Position: Sitting   Cuff Size: Adult   Pulse: 73   Temp: 97.9 °F (36.6 °C)   TempSrc: Oral   SpO2: 99%   Weight: 69.9 kg (154 lb 3.2 oz)   Height: 177.8 cm (70\")   PainSc:   1       Body mass index is 22.13 kg/m².  Discussed the patient's BMI with him. The BMI is in the acceptable range.    Physical Exam    Lab Results   Component Value Date     (H) 02/18/2020    CHLPL 107 02/18/2020    TRIG 78 02/18/2020    HDL 40 02/18/2020    LDL 51 02/18/2020    VLDL 15.6 02/18/2020    HGBA1C 9.20 (H) 02/18/2020        Assessment/Plan   Medicare Risks and Personalized Health Plan  CMS Preventative Services Quick Reference  Immunizations Discussed/Encouraged (specific immunizations; Shingrix )    The above risks/problems have been discussed with the patient.  Pertinent information has been shared with the patient in the After Visit Summary.  Follow up plans and orders are seen below in the Assessment/Plan Section.    Diagnoses and all orders for this visit:    1. Type 2 myocardial infarction (CMS/HCC) (Primary)    2. Hyperlipidemia, unspecified hyperlipidemia type    3. Type 2 diabetes mellitus with hyperglycemia, without long-term current use of insulin (CMS/[facility])    4. Paroxysmal atrial fibrillation (CMS/[facility])      Follow Up:  No follow-ups on file.     An After Visit Summary and PPPS were given to the patient.       We discussed diet and exercise      "

## 2020-02-25 NOTE — PROGRESS NOTES
Cece Teixeira is a 79 y.o. male here to follow up on dm, hpl, arthritis.    History of Present Illness   His sugar is very high  He says he was recently treated with steroids  Pt has been stable on current meds for atrial fibrillation.  No palp or SOB.  No CP or edema    The following portions of the patient's history were reviewed and updated as appropriate: allergies, current medications, past medical history, past social history and problem list.    Review of Systems   All other systems reviewed and are negative.      Objective   Physical Exam   Constitutional: He is oriented to person, place, and time. He appears well-developed and well-nourished.   HENT:   Head: Normocephalic and atraumatic.   Right Ear: External ear normal.   Left Ear: External ear normal.   Mouth/Throat: Oropharynx is clear and moist.   Eyes: Pupils are equal, round, and reactive to light. Conjunctivae and EOM are normal.   Neck: Normal range of motion. No tracheal deviation present. No thyromegaly present.   Cardiovascular: Normal rate, regular rhythm, normal heart sounds and intact distal pulses.   Pulmonary/Chest: Effort normal and breath sounds normal.   Abdominal: Soft. Bowel sounds are normal. He exhibits no distension. There is no tenderness.   Musculoskeletal: Normal range of motion. He exhibits no edema or deformity.   Neurological: He is alert and oriented to person, place, and time.   Skin: Skin is warm and dry.   Psychiatric: He has a normal mood and affect. His behavior is normal. Judgment and thought content normal.   Vitals reviewed.      Vitals:    02/25/20 1349   BP: 128/72   Pulse: 73   Temp: 97.9 °F (36.6 °C)   SpO2: 99%     Orders Only on 02/17/2020   Component Date Value Ref Range Status   • TSH 02/18/2020 2.240  0.270 - 4.200 uIU/mL Final   • Total Cholesterol 02/18/2020 107  0 - 200 mg/dL Final   • Triglycerides 02/18/2020 78  0 - 150 mg/dL Final   • HDL Cholesterol 02/18/2020 40  40 - 60 mg/dL Final   •  VLDL Cholesterol 02/18/2020 15.6  mg/dL Final   • LDL Cholesterol  02/18/2020 51  0 - 100 mg/dL Final   • LDL/HDL Ratio 02/18/2020 1.29   Final   • Hemoglobin A1C 02/18/2020 9.20* 4.80 - 5.60 % Final    Comment: Hemoglobin A1C Ranges:  Increased Risk for Diabetes  5.7% to 6.4%  Diabetes                     >= 6.5%  Diabetic Goal                < 7.0%     • Glucose 02/18/2020 148* 65 - 99 mg/dL Final   • BUN 02/18/2020 17  8 - 23 mg/dL Final   • Creatinine 02/18/2020 1.02  0.76 - 1.27 mg/dL Final   • eGFR Non  Am 02/18/2020 71  >60 mL/min/1.73 Final    Comment: The MDRD GFR formula is only valid for adults with stable  renal function between ages 18 and 70.     • eGFR  Am 02/18/2020 86  >60 mL/min/1.73 Final   • BUN/Creatinine Ratio 02/18/2020 16.7  7.0 - 25.0 Final   • Sodium 02/18/2020 143  136 - 145 mmol/L Final   • Potassium 02/18/2020 4.2  3.5 - 5.2 mmol/L Final   • Chloride 02/18/2020 104  98 - 107 mmol/L Final   • Total CO2 02/18/2020 28.0  22.0 - 29.0 mmol/L Final   • Calcium 02/18/2020 9.1  8.6 - 10.5 mg/dL Final   • Total Protein 02/18/2020 6.4  6.0 - 8.5 g/dL Final   • Albumin 02/18/2020 4.40  3.50 - 5.20 g/dL Final   • Globulin 02/18/2020 2.0  gm/dL Final   • A/G Ratio 02/18/2020 2.2  g/dL Final   • Total Bilirubin 02/18/2020 0.7  0.2 - 1.2 mg/dL Final   • Alkaline Phosphatase 02/18/2020 81  39 - 117 U/L Final   • AST (SGOT) 02/18/2020 17  1 - 40 U/L Final   • ALT (SGPT) 02/18/2020 23  1 - 41 U/L Final   • Creatinine, Urine 02/18/2020 64.2  Not Estab. mg/dL Final   • Microalbumin, Urine 02/18/2020 9.1  Not Estab. ug/mL Final   • Microalbumin/Creatinine Ratio 02/18/2020 14  0 - 29 mg/g creat Final    Comment:                        Normal:                0 -  29                         Moderately increased: 30 - 300                         Severely increased:       >300                **Please note reference interval change**       Current Outpatient Medications:   •  ACCU-CHEK JUAN DAVID PLUS  test strip, 1 each by Other route Daily. use to test blood sugar once daily, Disp: 100 each, Rfl: 11  •  ACCU-CHEK SOFTCLIX LANCETS lancets, 1 each by Other route Daily. DX CODE E11.65, Disp: 100 each, Rfl: 11  •  Acetaminophen (TYLENOL ARTHRITIS PAIN PO), Take 1 tablet by mouth 2 (Two) Times a Day., Disp: , Rfl:   •  atorvastatin (LIPITOR) 80 MG tablet, Take 80 mg by mouth Every Night., Disp: , Rfl:   •  carvedilol (COREG) 12.5 MG tablet, Take 12.5 mg by mouth 2 (Two) Times a Day With Meals., Disp: , Rfl:   •  ELIQUIS 5 MG tablet tablet, Take 5 mg by mouth 2 (Two) Times a Day., Disp: , Rfl: 3  •  FARXIGA 10 MG tablet, Take 10 mg by mouth Daily., Disp: , Rfl:   •  linagliptin (TRADJENTA) 5 MG tablet tablet, Take 5 mg by mouth daily., Disp: , Rfl:   •  metFORMIN XR (GLUCOPHATE-XR) 500 MG 24 hr tablet, Take 1,000 mg by mouth 2 (Two) Times a Day., Disp: , Rfl:   •  Multiple Vitamins-Minerals (PRESERVISION AREDS) capsule, Take 1 tablet by mouth 2 (Two) Times a Day., Disp: , Rfl:     Body mass index is 22.13 kg/m².         Assessment/Plan   Diagnoses and all orders for this visit:    Type 2 myocardial infarction (CMS/HCC)    Hyperlipidemia, unspecified hyperlipidemia type    Type 2 diabetes mellitus with hyperglycemia, without long-term current use of insulin (CMS/HCC)    Paroxysmal atrial fibrillation (CMS/HCC)      1.  AF_  Stable with current meds  He does feel himself go in and out  2.  HPL- ok with lipitor  3.  HTN- ok with coreg  4. DM- poorly controlled  Discussed diet at length with the patient.  He was on steroids for back pain in January  We had a long discussion about diet  HE does not want to take insulin.  Needs a new glucometer  He will need this sent to the pharmacy

## 2020-02-25 NOTE — PATIENT INSTRUCTIONS
Medicare Wellness  Personal Prevention Plan of Service     Date of Office Visit:  2020  Encounter Provider:  Carmela Sweet MD  Place of Service:  [facility] INTERNAL MEDICINE  Patient Name: Urban Teixeira  :  1940    As part of the Medicare Wellness portion of your visit today, we are providing you with this personalized preventive plan of services (PPPS). This plan is based upon recommendations of the [place] Preventive Services Task Force (USPSTF) and the Advisory Committee on Immunization Practices (ACIP).    This lists the preventive care services that should be considered, and provides dates of when you are due. Items listed as completed are up-to-date and do not require any further intervention.    Health Maintenance   Topic Date Due   • ZOSTER VACCINE (1 of 2) 1991   • Pneumococcal Vaccine Once at 65 Years Old  2006   • MEDICARE ANNUAL WELLNESS  2017   • COLONOSCOPY  2017   • HEMOGLOBIN A1C  2020   • DIABETIC EYE EXAM  10/15/2020   • LIPID PANEL  2021   • URINE MICROALBUMIN  2021   • TDAP/TD VACCINES (2 - Td) 2024   • INFLUENZA VACCINE  Completed       No orders of the defined types were placed in this encounter.      No follow-ups on file.          Fall Prevention in the Home, Adult  Falls can cause injuries. They can happen to people of all ages. There are many things you can do to make your home safe and to help prevent falls. Ask for help when making these changes, if needed.  What actions can I take to prevent falls?  General Instructions  · Use good lighting in all rooms. Replace any light bulbs that burn out.  · Turn on the lights when you go into a dark area. Use night-lights.  · Keep items that you use often in easy-to-reach places. Lower the shelves around your home if necessary.  · Set up your furniture so you have a clear path. Avoid moving your furniture around.  · Do not have throw rugs and other things on the  floor that can make you trip.  · Avoid walking on wet floors.  · If any of your floors are uneven, fix them.  · Add color or contrast paint or tape to clearly sari and help you see:  ? Any grab bars or handrails.  ? First and last steps of stairways.  ? Where the edge of each step is.  · If you use a stepladder:  ? Make sure that it is fully opened. Do not climb a closed stepladder.  ? Make sure that both sides of the stepladder are locked into place.  ? Ask someone to hold the stepladder for you while you use it.  · If there are any pets around you, be aware of where they are.  What can I do in the bathroom?         · Keep the floor dry. Clean up any water that spills onto the floor as soon as it happens.  · Remove soap buildup in the tub or shower regularly.  · Use non-skid mats or decals on the floor of the tub or shower.  · Attach bath mats securely with double-sided, non-slip rug tape.  · If you need to sit down in the shower, use a plastic, non-slip stool.  · Install grab bars by the toilet and in the tub and shower. Do not use towel bars as grab bars.  What can I do in the bedroom?  · Make sure that you have a light by your bed that is easy to reach.  · Do not use any sheets or blankets that are too big for your bed. They should not hang down onto the floor.  · Have a firm chair that has side arms. You can use this for support while you get dressed.  What can I do in the kitchen?  · Clean up any spills right away.  · If you need to reach something above you, use a strong step stool that has a grab bar.  · Keep electrical cords out of the way.  · Do not use floor polish or wax that makes floors slippery. If you must use wax, use non-skid floor wax.  What can I do with my stairs?  · Do not leave any items on the stairs.  · Make sure that you have a light switch at the top of the stairs and the bottom of the stairs. If you do not have them, ask someone to add them for you.  · Make sure that there are handrails  on both sides of the stairs, and use them. Fix handrails that are broken or loose. Make sure that handrails are as long as the stairways.  · Install non-slip stair treads on all stairs in your home.  · Avoid having throw rugs at the top or bottom of the stairs. If you do have throw rugs, attach them to the floor with carpet tape.  · Choose a carpet that does not hide the edge of the steps on the stairway.  · Check any carpeting to make sure that it is firmly attached to the stairs. Fix any carpet that is loose or worn.  What can I do on the outside of my home?  · Use bright outdoor lighting.  · Regularly fix the edges of walkways and driveways and fix any cracks.  · Remove anything that might make you trip as you walk through a door, such as a raised step or threshold.  · Trim any bushes or trees on the path to your home.  · Regularly check to see if handrails are loose or broken. Make sure that both sides of any steps have handrails.  · Install guardrails along the edges of any raised decks and porches.  · Clear walking paths of anything that might make someone trip, such as tools or rocks.  · Have any leaves, snow, or ice cleared regularly.  · Use sand or salt on walking paths during winter.  · Clean up any spills in your garage right away. This includes grease or oil spills.  What other actions can I take?  · Wear shoes that:  ? Have a low heel. Do not wear high heels.  ? Have rubber bottoms.  ? Are comfortable and fit you well.  ? Are closed at the toe. Do not wear open-toe sandals.  · Use tools that help you move around (mobility aids) if they are needed. These include:  ? Canes.  ? Walkers.  ? Scooters.  ? Crutches.  · Review your medicines with your doctor. Some medicines can make you feel dizzy. This can increase your chance of falling.  Ask your doctor what other things you can do to help prevent falls.  Where to find more information  · Centers for Disease Control and PreventionTORY:  https://cdc.gov  · National [place] on Aging: https://ol1uwlv.devin.nih.gov  Contact a doctor if:  · You are afraid of falling at home.  · You feel weak, drowsy, or dizzy at home.  · You fall at home.  Summary  · There are many simple things that you can do to make your home safe and to help prevent falls.  · Ways to make your home safe include removing tripping hazards and installing grab bars in the bathroom.  · Ask for help when making these changes in your home.  This information is not intended to replace advice given to you by your health care provider. Make sure you discuss any questions you have with your health care provider.  Document Released: 10/14/2010 Document Revised: 08/02/2018 Document Reviewed: 08/02/2018  [employer] Interactive Patient Education © 2020 [employer]      Diabetes Mellitus and Nutrition, Adult  When you have diabetes (diabetes mellitus), it is very important to have healthy eating habits because your blood sugar (glucose) levels are greatly affected by what you eat and drink. Eating healthy foods in the appropriate amounts, at about the same times every day, can help you:  · Control your blood glucose.  · Lower your risk of heart disease.  · Improve your blood pressure.  · Reach or maintain a healthy weight.  Every person with diabetes is different, and each person has different needs for a meal plan. Your health care provider may recommend that you work with a diet and nutrition specialist (dietitian) to make a meal plan that is best for you. Your meal plan may vary depending on factors such as:  · The calories you need.  · The medicines you take.  · Your weight.  · Your blood glucose, blood pressure, and cholesterol levels.  · Your activity level.  · Other health conditions you have, such as heart or kidney disease.  How do carbohydrates affect me?  Carbohydrates, also called carbs, affect your blood glucose level more than any other type of food. Eating carbs naturally raises the  "amount of glucose in your blood. Carb counting is a method for keeping track of how many carbs you eat. Counting carbs is important to keep your blood glucose at a healthy level, especially if you use insulin or take certain oral diabetes medicines.  It is important to know how many carbs you can safely have in each meal. This is different for every person. Your dietitian can help you calculate how many carbs you should have at each meal and for each snack.  Foods that contain carbs include:  · Bread, cereal, rice, pasta, and crackers.  · Potatoes and corn.  · Peas, beans, and lentils.  · Milk and yogurt.  · Fruit and juice.  · Desserts, such as cakes, cookies, ice cream, and candy.  How does alcohol affect me?  Alcohol can cause a sudden decrease in blood glucose (hypoglycemia), especially if you use insulin or take certain oral diabetes medicines. Hypoglycemia can be a life-threatening condition. Symptoms of hypoglycemia (sleepiness, dizziness, and confusion) are similar to symptoms of having too much alcohol.  If your health care provider says that alcohol is safe for you, follow these guidelines:  · Limit alcohol intake to no more than 1 drink per day for nonpregnant women and 2 drinks per day for men. One drink equals 12 oz of beer, 5 oz of wine, or 1½ oz of hard liquor.  · Do not drink on an empty stomach.  · Keep yourself hydrated with water, diet soda, or unsweetened iced tea.  · Keep in mind that regular soda, juice, and other mixers may contain a lot of sugar and must be counted as carbs.  What are tips for following this plan?    Reading food labels  · Start by checking the serving size on the \"Nutrition Facts\" label of packaged foods and drinks. The amount of calories, carbs, fats, and other nutrients listed on the label is based on one serving of the item. Many items contain more than one serving per package.  · Check the total grams (g) of carbs in one serving. You can calculate the number of servings " "of carbs in one serving by dividing the total carbs by 15. For example, if a food has 30 g of total carbs, it would be equal to 2 servings of carbs.  · Check the number of grams (g) of saturated and trans fats in one serving. Choose foods that have low or no amount of these fats.  · Check the number of milligrams (mg) of salt (sodium) in one serving. Most people should limit total sodium intake to less than 2,300 mg per day.  · Always check the nutrition information of foods labeled as \"low-fat\" or \"nonfat\". These foods may be higher in added sugar or refined carbs and should be avoided.  · Talk to your dietitian to identify your daily goals for nutrients listed on the label.  Shopping  · Avoid buying canned, premade, or processed foods. These foods tend to be high in fat, sodium, and added sugar.  · Shop around the outside edge of the grocery store. This includes fresh fruits and vegetables, bulk grains, fresh meats, and fresh dairy.  Cooking  · Use low-heat cooking methods, such as baking, instead of high-heat cooking methods like deep frying.  · Cook using healthy oils, such as olive, canola, or sunflower oil.  · Avoid cooking with butter, cream, or high-fat meats.  Meal planning  · Eat meals and snacks regularly, preferably at the same times every day. Avoid going long periods of time without eating.  · Eat foods high in fiber, such as fresh fruits, vegetables, beans, and whole grains. Talk to your dietitian about how many servings of carbs you can eat at each meal.  · Eat 4-6 ounces (oz) of lean protein each day, such as lean meat, chicken, fish, eggs, or tofu. One oz of lean protein is equal to:  ? 1 oz of meat, chicken, or fish.  ? 1 egg.  ? ¼ cup of tofu.  · Eat some foods each day that contain healthy fats, such as avocado, nuts, seeds, and fish.  Lifestyle  · Check your blood glucose regularly.  · Exercise regularly as told by your health care provider. This may include:  ? 150 minutes of " moderate-intensity or vigorous-intensity exercise each week. This could be brisk walking, biking, or water aerobics.  ? Stretching and doing strength exercises, such as yoga or weightlifting, at least 2 times a week.  · Take medicines as told by your health care provider.  · Do not use any products that contain nicotine or tobacco, such as cigarettes and e-cigarettes. If you need help quitting, ask your health care provider.  · Work with a counselor or diabetes educator to identify strategies to manage stress and any emotional and social challenges.  Questions to ask a health care provider  · Do I need to meet with a diabetes educator?  · Do I need to meet with a dietitian?  · What number can I call if I have questions?  · When are the best times to check my blood glucose?  Where to find more information:  · American Diabetes Association: diabetes.org  · Academy of Nutrition and Dietetics: www.eatright.org  · National [place] of Diabetes and Digestive and Kidney Diseases (NIH): www.niddk.nih.gov  Summary  · A healthy meal plan will help you control your blood glucose and maintain a healthy lifestyle.  · Working with a diet and nutrition specialist (dietitian) can help you make a meal plan that is best for you.  · Keep in mind that carbohydrates (carbs) and alcohol have immediate effects on your blood glucose levels. It is important to count carbs and to use alcohol carefully.  This information is not intended to replace advice given to you by your health care provider. Make sure you discuss any questions you have with your health care provider.  Document Released: 09/14/2006 Document Revised: 01/14/2020 Document Reviewed: 01/22/2018  [employer] Interactive Patient Education © 2020 [employer]

## 2020-03-26 ENCOUNTER — TELEPHONE (OUTPATIENT)
Dept: INTERNAL MEDICINE | Facility: CLINIC | Age: 80
End: 2020-03-26

## 2020-03-26 NOTE — TELEPHONE ENCOUNTER
PT AWARE    ----- Message from Carmela Sweet MD sent at 3/26/2020  9:45 AM EDT -----  Sugars look better  Keep up the good work  ----- Message -----  From: Stephanie Garsia MA  Sent: 3/26/2020   9:21 AM EDT  To: Carmela Sweet MD    PT FAXED A PRINT OUT OF [facility] READINGS, THEY ARE IN [place]BB[place]

## 2020-03-30 RX ORDER — BLOOD-GLUCOSE METER
EACH MISCELLANEOUS
Qty: 1 EACH | Refills: 0 | Status: SHIPPED | OUTPATIENT
Start: 2020-03-30 | End: 2020-03-30 | Stop reason: SDUPTHER

## 2020-03-30 RX ORDER — BLOOD-GLUCOSE METER
1 EACH MISCELLANEOUS SEE ADMIN INSTRUCTIONS
Qty: 1 EACH | Refills: 0 | Status: SHIPPED | OUTPATIENT
Start: 2020-03-30 | End: 2022-12-08

## 2020-05-25 ENCOUNTER — RESULTS ENCOUNTER (OUTPATIENT)
Dept: INTERNAL MEDICINE | Facility: CLINIC | Age: 80
End: 2020-05-25

## 2020-05-25 DIAGNOSIS — I21.A1 TYPE 2 MYOCARDIAL INFARCTION (HCC): ICD-10-CM

## 2020-05-26 ENCOUNTER — OFFICE VISIT (OUTPATIENT)
Dept: INTERNAL MEDICINE | Facility: CLINIC | Age: 80
End: 2020-05-26

## 2020-05-26 VITALS
SYSTOLIC BLOOD PRESSURE: 130 MMHG | OXYGEN SATURATION: 98 % | WEIGHT: 152.1 LBS | HEART RATE: 83 BPM | DIASTOLIC BLOOD PRESSURE: 72 MMHG | HEIGHT: 70 IN | TEMPERATURE: 98.1 F | BODY MASS INDEX: 21.77 KG/M2

## 2020-05-26 DIAGNOSIS — I48.0 PAROXYSMAL ATRIAL FIBRILLATION (HCC): ICD-10-CM

## 2020-05-26 DIAGNOSIS — E78.5 HYPERLIPIDEMIA, UNSPECIFIED HYPERLIPIDEMIA TYPE: Primary | ICD-10-CM

## 2020-05-26 DIAGNOSIS — E11.65 TYPE 2 DIABETES MELLITUS WITH HYPERGLYCEMIA, WITHOUT LONG-TERM CURRENT USE OF INSULIN (HCC): ICD-10-CM

## 2020-05-26 PROCEDURE — 99214 OFFICE O/P EST MOD 30 MIN: CPT | Performed by: INTERNAL MEDICINE

## 2020-05-26 NOTE — PROGRESS NOTES
Cece Teixeira is a 79 y.o. male here to f/u on DM, HPL.    History of Present Illness   Pt has been compliant with diabetes meds. No side effects from medication No episodes of hypoglycemia. Patient denies any polyuria or polydipsia  Pt has been taking cholesterol meds as prescribed.  No difficulties with myalgias.   Pt has been stable on current meds for atrial fibrillation.  No palp or SOB.  No CP or edema    The following portions of the patient's history were reviewed and updated as appropriate: allergies, current medications, past medical history, past social history and problem list.  Diet has been better    Review of Systems   All other systems reviewed and are negative.      Objective   Physical Exam   Constitutional: He is oriented to person, place, and time. He appears well-developed and well-nourished.   HENT:   Head: Normocephalic and atraumatic.   Right Ear: External ear normal.   Left Ear: External ear normal.   Mouth/Throat: Oropharynx is clear and moist.   Eyes: Pupils are equal, round, and reactive to light. Conjunctivae and EOM are normal.   Neck: Normal range of motion. No tracheal deviation present. No thyromegaly present.   Cardiovascular: Normal rate, regular rhythm, normal heart sounds and intact distal pulses.   Pulmonary/Chest: Effort normal and breath sounds normal.   Abdominal: Soft. Bowel sounds are normal. He exhibits no distension. There is no tenderness.   Musculoskeletal: Normal range of motion. He exhibits no edema or deformity.   Neurological: He is alert and oriented to person, place, and time.   Skin: Skin is warm and dry.   Psychiatric: He has a normal mood and affect. His behavior is normal. Judgment and thought content normal.   Nursing note and vitals reviewed.      Vitals:    05/26/20 1345   BP: 130/72   Pulse: 83   Temp: 98.1 °F (36.7 °C)   SpO2: 98%     Current Outpatient Medications:   •  Acetaminophen (TYLENOL ARTHRITIS PAIN PO), Take 1 tablet by mouth 2  (Two) Times a Day., Disp: , Rfl:   •  atorvastatin (LIPITOR) 80 MG tablet, Take 1 tablet by mouth Every Night., Disp: 90 tablet, Rfl: 3  •  Blood Glucose Monitoring Suppl (ONE TOUCH ULTRA 2) w/Device kit, 1 each by Other route See Admin Instructions., Disp: 1 each, Rfl: 0  •  carvedilol (COREG) 12.5 MG tablet, Take 1 tablet by mouth 2 (Two) Times a Day With Meals., Disp: 180 tablet, Rfl: 3  •  ELIQUIS 5 MG tablet tablet, Take 1 tablet by mouth 2 (Two) Times a Day., Disp: 180 tablet, Rfl: 3  •  FARXIGA 10 MG tablet, Take 10 mg by mouth Daily., Disp: 90 tablet, Rfl: 3  •  glucose blood (PHARMACIST CHOICE AUTOCODE) test strip, 1 each by Other route Daily. Use as instructed, Disp: 100 each, Rfl: 3  •  linagliptin (TRADJENTA) 5 MG tablet tablet, Take 1 tablet by mouth Daily., Disp: 90 tablet, Rfl: 3  •  metFORMIN ER (GLUCOPHAGE-XR) 500 MG 24 hr tablet, Take 2 tablets by mouth 2 (Two) Times a Day., Disp: 360 tablet, Rfl: 3  •  Multiple Vitamins-Minerals (PRESERVISION AREDS) capsule, Take 1 tablet by mouth 2 (Two) Times a Day., Disp: , Rfl:   •  PHARMACIST CHOICE LANCETS misc, 1 each 2 (Two) Times a Day., Disp: 200 each, Rfl: 3    Body mass index is 21.82 kg/m².         Assessment/Plan   Diagnoses and all orders for this visit:    Hyperlipidemia, unspecified hyperlipidemia type    Type 2 diabetes mellitus with hyperglycemia, without long-term current use of insulin (CMS/HCC)    Paroxysmal atrial fibrillation (CMS/HCC)      1.  AF- he does not feel anything but he does have some events during the day on and offf  Cont on the eloquis  2.  DM-  We will recheck  Sugars today  He thinks he is better  3.  HPL- ok with lipitor

## 2020-05-27 LAB
ALBUMIN SERPL-MCNC: 4.7 G/DL (ref 3.5–5.2)
ALBUMIN/GLOB SERPL: 2.1 G/DL
ALP SERPL-CCNC: 85 U/L (ref 39–117)
ALT SERPL-CCNC: 15 U/L (ref 1–41)
AST SERPL-CCNC: 16 U/L (ref 1–40)
BILIRUB SERPL-MCNC: 0.7 MG/DL (ref 0.2–1.2)
BUN SERPL-MCNC: 21 MG/DL (ref 8–23)
BUN/CREAT SERPL: 15.8 (ref 7–25)
CALCIUM SERPL-MCNC: 9.5 MG/DL (ref 8.6–10.5)
CHLORIDE SERPL-SCNC: 102 MMOL/L (ref 98–107)
CO2 SERPL-SCNC: 30.1 MMOL/L (ref 22–29)
CREAT SERPL-MCNC: 1.33 MG/DL (ref 0.76–1.27)
GLOBULIN SER CALC-MCNC: 2.2 GM/DL
GLUCOSE SERPL-MCNC: 169 MG/DL (ref 65–99)
HBA1C MFR BLD: 8.8 % (ref 4.8–5.6)
POTASSIUM SERPL-SCNC: 4.2 MMOL/L (ref 3.5–5.2)
PROT SERPL-MCNC: 6.9 G/DL (ref 6–8.5)
SODIUM SERPL-SCNC: 141 MMOL/L (ref 136–145)

## 2020-08-25 LAB
ALBUMIN SERPL-MCNC: 4.7 G/DL (ref 3.7–4.7)
ALBUMIN/GLOB SERPL: 2.8 {RATIO} (ref 1.2–2.2)
ALP SERPL-CCNC: 77 IU/L (ref 39–117)
ALT SERPL-CCNC: 14 IU/L (ref 0–44)
AST SERPL-CCNC: 21 IU/L (ref 0–40)
BASOPHILS # BLD AUTO: 0 X10E3/UL (ref 0–0.2)
BASOPHILS NFR BLD AUTO: 0 %
BILIRUB SERPL-MCNC: 1.2 MG/DL (ref 0–1.2)
BUN SERPL-MCNC: 18 MG/DL (ref 8–27)
BUN/CREAT SERPL: 15 (ref 10–24)
CALCIUM SERPL-MCNC: 10 MG/DL (ref 8.6–10.2)
CHLORIDE SERPL-SCNC: 101 MMOL/L (ref 96–106)
CHOLEST SERPL-MCNC: 107 MG/DL (ref 100–199)
CO2 SERPL-SCNC: 25 MMOL/L (ref 20–29)
CREAT SERPL-MCNC: 1.18 MG/DL (ref 0.76–1.27)
EOSINOPHIL # BLD AUTO: 0.1 X10E3/UL (ref 0–0.4)
EOSINOPHIL NFR BLD AUTO: 1 %
ERYTHROCYTE [DISTWIDTH] IN BLOOD BY AUTOMATED COUNT: 12.8 % (ref 11.6–15.4)
GLOBULIN SER CALC-MCNC: 1.7 G/DL (ref 1.5–4.5)
GLUCOSE SERPL-MCNC: 145 MG/DL (ref 65–99)
HBA1C MFR BLD: 8.5 % (ref 4.8–5.6)
HCT VFR BLD AUTO: 44.6 % (ref 37.5–51)
HCV AB S/CO SERPL IA: <0.1 S/CO RATIO (ref 0–0.9)
HDLC SERPL-MCNC: 43 MG/DL
HGB BLD-MCNC: 14.5 G/DL (ref 13–17.7)
IMM GRANULOCYTES # BLD AUTO: 0 X10E3/UL (ref 0–0.1)
IMM GRANULOCYTES NFR BLD AUTO: 0 %
LDLC SERPL CALC-MCNC: 47 MG/DL (ref 0–99)
LDLC/HDLC SERPL: 1.1 RATIO (ref 0–3.6)
LYMPHOCYTES # BLD AUTO: 1 X10E3/UL (ref 0.7–3.1)
LYMPHOCYTES NFR BLD AUTO: 17 %
MCH RBC QN AUTO: 30.8 PG (ref 26.6–33)
MCHC RBC AUTO-ENTMCNC: 32.5 G/DL (ref 31.5–35.7)
MCV RBC AUTO: 95 FL (ref 79–97)
MONOCYTES # BLD AUTO: 0.3 X10E3/UL (ref 0.1–0.9)
MONOCYTES NFR BLD AUTO: 6 %
NEUTROPHILS # BLD AUTO: 4.3 X10E3/UL (ref 1.4–7)
NEUTROPHILS NFR BLD AUTO: 76 %
PLATELET # BLD AUTO: 130 X10E3/UL (ref 150–450)
POTASSIUM SERPL-SCNC: 5 MMOL/L (ref 3.5–5.2)
PROT SERPL-MCNC: 6.4 G/DL (ref 6–8.5)
RBC # BLD AUTO: 4.71 X10E6/UL (ref 4.14–5.8)
SODIUM SERPL-SCNC: 142 MMOL/L (ref 134–144)
TRIGL SERPL-MCNC: 83 MG/DL (ref 0–149)
VLDLC SERPL CALC-MCNC: 17 MG/DL (ref 5–40)
WBC # BLD AUTO: 5.7 X10E3/UL (ref 3.4–10.8)

## 2020-08-26 ENCOUNTER — RESULTS ENCOUNTER (OUTPATIENT)
Dept: INTERNAL MEDICINE | Facility: CLINIC | Age: 80
End: 2020-08-26

## 2020-08-26 DIAGNOSIS — E11.65 TYPE 2 DIABETES MELLITUS WITH HYPERGLYCEMIA, WITHOUT LONG-TERM CURRENT USE OF INSULIN (HCC): ICD-10-CM

## 2020-08-26 DIAGNOSIS — E78.5 HYPERLIPIDEMIA, UNSPECIFIED HYPERLIPIDEMIA TYPE: ICD-10-CM

## 2020-08-26 DIAGNOSIS — I48.0 PAROXYSMAL ATRIAL FIBRILLATION (HCC): ICD-10-CM

## 2020-08-28 ENCOUNTER — OFFICE VISIT (OUTPATIENT)
Dept: INTERNAL MEDICINE | Facility: CLINIC | Age: 80
End: 2020-08-28

## 2020-08-28 VITALS
HEIGHT: 70 IN | BODY MASS INDEX: 22.06 KG/M2 | DIASTOLIC BLOOD PRESSURE: 78 MMHG | OXYGEN SATURATION: 99 % | HEART RATE: 81 BPM | WEIGHT: 154.1 LBS | SYSTOLIC BLOOD PRESSURE: 138 MMHG

## 2020-08-28 DIAGNOSIS — I48.0 PAROXYSMAL ATRIAL FIBRILLATION (HCC): ICD-10-CM

## 2020-08-28 DIAGNOSIS — I15.9 SECONDARY HYPERTENSION: Primary | ICD-10-CM

## 2020-08-28 DIAGNOSIS — E11.65 TYPE 2 DIABETES MELLITUS WITH HYPERGLYCEMIA, WITHOUT LONG-TERM CURRENT USE OF INSULIN (HCC): ICD-10-CM

## 2020-08-28 PROCEDURE — 99213 OFFICE O/P EST LOW 20 MIN: CPT | Performed by: INTERNAL MEDICINE

## 2020-08-28 NOTE — PATIENT INSTRUCTIONS

## 2020-08-28 NOTE — PROGRESS NOTES
Cece Teixeira is a 79 y.o. male here today to f/u on hyperlipidemia, DM 2 and atrial fibrillation.  Pt was in the ER on 8/2/2020 for atrial fibrillation, Pt has f/u with Cardiology.      History of Present Illness   He has been walking  He does check his sugars in the am sugar 130    The following portions of the patient's history were reviewed and updated as appropriate: allergies, current medications, past medical history, past social history and problem list.    Review of Systems    Objective   Physical Exam    Vitals:    08/28/20 1008   BP: 138/78   Pulse: 81   SpO2: 99%     Body mass index is 22.11 kg/m².    Results Encounter on 08/26/2020   Component Date Value Ref Range Status   • Glucose 08/24/2020 145* 65 - 99 mg/dL Final   • BUN 08/24/2020 18  8 - 27 mg/dL Final   • Creatinine 08/24/2020 1.18  0.76 - 1.27 mg/dL Final   • eGFR Non  Am 08/24/2020 59* >59 mL/min/1.73 Final   • eGFR African Am 08/24/2020 68  >59 mL/min/1.73 Final   • BUN/Creatinine Ratio 08/24/2020 15  10 - 24 Final   • Sodium 08/24/2020 142  134 - 144 mmol/L Final   • Potassium 08/24/2020 5.0  3.5 - 5.2 mmol/L Final   • Chloride 08/24/2020 101  96 - 106 mmol/L Final   • Total CO2 08/24/2020 25  20 - 29 mmol/L Final   • Calcium 08/24/2020 10.0  8.6 - 10.2 mg/dL Final   • Total Protein 08/24/2020 6.4  6.0 - 8.5 g/dL Final   • Albumin 08/24/2020 4.7  3.7 - 4.7 g/dL Final   • Globulin 08/24/2020 1.7  1.5 - 4.5 g/dL Final   • A/G Ratio 08/24/2020 2.8* 1.2 - 2.2 Final   • Total Bilirubin 08/24/2020 1.2  0.0 - 1.2 mg/dL Final   • Alkaline Phosphatase 08/24/2020 77  39 - 117 IU/L Final   • AST (SGOT) 08/24/2020 21  0 - 40 IU/L Final   • ALT (SGPT) 08/24/2020 14  0 - 44 IU/L Final   • Hemoglobin A1C 08/24/2020 8.5* 4.8 - 5.6 % Final    Comment:          Prediabetes: 5.7 - 6.4           Diabetes: >6.4           Glycemic control for adults with diabetes: <7.0     • Total Cholesterol 08/24/2020 107  100 - 199 mg/dL Final   •  Triglycerides 08/24/2020 83  0 - 149 mg/dL Final   • HDL Cholesterol 08/24/2020 43  >39 mg/dL Final   • VLDL Cholesterol 08/24/2020 17  5 - 40 mg/dL Final   • LDL Cholesterol  08/24/2020 47  0 - 99 mg/dL Final    Comment: **Effective August 31, 2020, [facility] is implementing an improved**  equation to calculate Low Density Lipoprotein Cholesterol (LDL-C)  concentrations, to be used in all lipid panels that report calculated  LDL-C. This equation was developed through a collaboration with the  National Heart, Lung and Blood Institutes of Health (NIH).[1] The NIH  calculation overcomes the limitations of the existing Friedewald  LDL-C equation and performs equally well in both fasting and  non-fasting individuals.  1. Brock M, Amie C, Ernestina Q, et al. A new equation for  calculation of low-density lipoprotein cholesterol in patients with  normolipidemia and/or hypertriglyceridemia. [facility].  2020 Feb 26. doi:10.1001/jamacardio.2020.0013     • LDL/HDL Ratio 08/24/2020 1.1  0.0 - 3.6 ratio Final    Comment:                                     LDL/HDL Ratio                                              Men  Women                                1/2 Avg.Risk  1.0    1.5                                    Avg.Risk  3.6    3.2                                 2X Avg.Risk  6.2    5.0                                 3X Avg.Risk  8.0    6.1     • WBC 08/24/2020 5.7  3.4 - 10.8 x10E3/uL Final    **Verified by repeat analysis**   • RBC 08/24/2020 4.71  4.14 - 5.80 x10E6/uL Final   • Hemoglobin 08/24/2020 14.5  13.0 - 17.7 g/dL Final   • Hematocrit 08/24/2020 44.6  37.5 - 51.0 % Final   • MCV 08/24/2020 95  79 - 97 fL Final   • MCH 08/24/2020 30.8  26.6 - 33.0 pg Final   • MCHC 08/24/2020 32.5  31.5 - 35.7 g/dL Final   • RDW 08/24/2020 12.8  11.6 - 15.4 % Final   • Platelets 08/24/2020 130* 150 - 450 x10E3/uL Final   • Neutrophil Rel % 08/24/2020 76  Not Estab. % Final   • Lymphocyte Rel % 08/24/2020 17  Not Estab. % Final   •  Monocyte Rel % 08/24/2020 6  Not Estab. % Final   • Eosinophil Rel % 08/24/2020 1  Not Estab. % Final   • Basophil Rel % 08/24/2020 0  Not Estab. % Final   • Neutrophils Absolute 08/24/2020 4.3  1.4 - 7.0 x10E3/uL Final   • Lymphocytes Absolute 08/24/2020 1.0  0.7 - 3.1 x10E3/uL Final   • Monocytes Absolute 08/24/2020 0.3  0.1 - 0.9 x10E3/uL Final   • Eosinophils Absolute 08/24/2020 0.1  0.0 - 0.4 x10E3/uL Final   • Basophils Absolute 08/24/2020 0.0  0.0 - 0.2 x10E3/uL Final   • Immature Granulocyte Rel % 08/24/2020 0  Not Estab. % Final   • Immature Grans Absolute 08/24/2020 0.0  0.0 - 0.1 x10E3/uL Final   • Hep C Virus Ab 08/24/2020 <0.1  0.0 - 0.9 s/co ratio Final    Comment:                                   Negative:     < 0.8                               Indeterminate: 0.8 - 0.9                                    Positive:     > 0.9   The CDC recommends that a positive HCV antibody result   be followed up with a HCV Nucleic Acid Amplification   test (948264).          Current Outpatient Medications:   •  Acetaminophen (TYLENOL ARTHRITIS PAIN PO), Take 1 tablet by mouth 2 (Two) Times a Day., Disp: , Rfl:   •  atorvastatin (LIPITOR) 80 MG tablet, Take 1 tablet by mouth Every Night., Disp: 90 tablet, Rfl: 3  •  Blood Glucose Monitoring Suppl (ONE TOUCH ULTRA 2) w/Device kit, 1 each by Other route See Admin Instructions., Disp: 1 each, Rfl: 0  •  carvedilol (COREG) 12.5 MG tablet, Take 1 tablet by mouth 2 (Two) Times a Day With Meals., Disp: 180 tablet, Rfl: 3  •  ELIQUIS 5 MG tablet tablet, Take 1 tablet by mouth 2 (Two) Times a Day., Disp: 180 tablet, Rfl: 3  •  FARXIGA 10 MG tablet, Take 10 mg by mouth Daily., Disp: 90 tablet, Rfl: 3  •  glucose blood (PHARMACIST CHOICE AUTOCODE) test strip, 1 each by Other route Daily. Use as instructed, Disp: 100 each, Rfl: 3  •  linagliptin (TRADJENTA) 5 MG tablet tablet, Take 1 tablet by mouth Daily., Disp: 90 tablet, Rfl: 3  •  metFORMIN ER (GLUCOPHAGE-XR) 500 MG 24 hr  tablet, Take 2 tablets by mouth 2 (Two) Times a Day., Disp: 360 tablet, Rfl: 3  •  Multiple Vitamins-Minerals (PRESERVISION AREDS) capsule, Take 1 tablet by mouth 2 (Two) Times a Day., Disp: , Rfl:   •  PHARMACIST CHOICE LANCETS misc, 1 each 2 (Two) Times a Day., Disp: 200 each, Rfl: 3      Assessment/Plan   Diagnoses and all orders for this visit:    Secondary hypertension    Paroxysmal atrial fibrillation (CMS/HCC)    Type 2 diabetes mellitus with hyperglycemia, without long-term current use of insulin (CMS/HCC)      1. DM-  He does not want insulin and he is going to try working on diet  Discussed avoid evening carbs  2. AF-  He did have another afib episode and went to the ER  Stable on eloquis  coreg  3.  HTN- ok with current meds

## 2020-10-14 ENCOUNTER — FLU SHOT (OUTPATIENT)
Dept: INTERNAL MEDICINE | Facility: CLINIC | Age: 80
End: 2020-10-14

## 2020-10-14 DIAGNOSIS — Z23 NEED FOR INFLUENZA VACCINATION: ICD-10-CM

## 2020-10-14 PROCEDURE — 90694 VACC AIIV4 NO PRSRV 0.5ML IM: CPT | Performed by: INTERNAL MEDICINE

## 2020-10-14 PROCEDURE — G0008 ADMIN INFLUENZA VIRUS VAC: HCPCS | Performed by: INTERNAL MEDICINE

## 2020-11-18 DIAGNOSIS — E11.65 TYPE 2 DIABETES MELLITUS WITH HYPERGLYCEMIA, WITHOUT LONG-TERM CURRENT USE OF INSULIN (HCC): ICD-10-CM

## 2020-11-18 DIAGNOSIS — I48.0 PAROXYSMAL ATRIAL FIBRILLATION (HCC): ICD-10-CM

## 2020-11-20 LAB
ALBUMIN SERPL-MCNC: 4.4 G/DL (ref 3.7–4.7)
ALBUMIN/GLOB SERPL: 2.1 {RATIO} (ref 1.2–2.2)
ALP SERPL-CCNC: 79 IU/L (ref 39–117)
ALT SERPL-CCNC: 17 IU/L (ref 0–44)
AST SERPL-CCNC: 20 IU/L (ref 0–40)
BILIRUB SERPL-MCNC: 1 MG/DL (ref 0–1.2)
BUN SERPL-MCNC: 23 MG/DL (ref 8–27)
BUN/CREAT SERPL: 21 (ref 10–24)
CALCIUM SERPL-MCNC: 9.5 MG/DL (ref 8.6–10.2)
CHLORIDE SERPL-SCNC: 104 MMOL/L (ref 96–106)
CO2 SERPL-SCNC: 25 MMOL/L (ref 20–29)
CREAT SERPL-MCNC: 1.12 MG/DL (ref 0.76–1.27)
GLOBULIN SER CALC-MCNC: 2.1 G/DL (ref 1.5–4.5)
GLUCOSE SERPL-MCNC: 122 MG/DL (ref 65–99)
HBA1C MFR BLD: 8.8 % (ref 4.8–5.6)
POTASSIUM SERPL-SCNC: 4.9 MMOL/L (ref 3.5–5.2)
PROT SERPL-MCNC: 6.5 G/DL (ref 6–8.5)
SODIUM SERPL-SCNC: 144 MMOL/L (ref 134–144)

## 2020-11-25 ENCOUNTER — TELEMEDICINE (OUTPATIENT)
Dept: INTERNAL MEDICINE | Facility: CLINIC | Age: 80
End: 2020-11-25

## 2020-11-25 VITALS — BODY MASS INDEX: 21.47 KG/M2 | WEIGHT: 150 LBS | HEIGHT: 70 IN

## 2020-11-25 DIAGNOSIS — E11.43 TYPE 2 DIABETES MELLITUS WITH DIABETIC AUTONOMIC NEUROPATHY, WITHOUT LONG-TERM CURRENT USE OF INSULIN (HCC): Primary | ICD-10-CM

## 2020-11-25 PROCEDURE — 99213 OFFICE O/P EST LOW 20 MIN: CPT | Performed by: INTERNAL MEDICINE

## 2020-11-25 NOTE — PROGRESS NOTES
Cece Teixeira is a 80 y.o. male.     History of Present Illness   He says he is not going to take insulin.  He does walk every day.  He says he could be better   The following portions of the patient's history were reviewed and updated as appropriate: allergies, current medications, past medical history, past social history and problem list.    Review of Systems    Objective   Physical Exam  Constitutional:       Appearance: Normal appearance.   Neurological:      General: No focal deficit present.      Mental Status: He is alert and oriented to person, place, and time.         There were no vitals filed for this visit.  Body mass index is 21.52 kg/m².         Assessment/Plan   Diagnoses and all orders for this visit:    1. Type 2 diabetes mellitus with diabetic autonomic neuropathy, without long-term current use of insulin (CMS/[facility]) (Primary)  -     Ambulatory Referral to Diabetic Education    1.  DM-  He declines insulin again but says he says he needs to work harder on diet and I will refer him for diabetes education'

## 2021-01-14 ENCOUNTER — TELEPHONE (OUTPATIENT)
Dept: INTERNAL MEDICINE | Facility: CLINIC | Age: 81
End: 2021-01-14

## 2021-01-14 NOTE — TELEPHONE ENCOUNTER
PATIENT STATES HE HAS SIGNED UP TO GET THE COVID VACCINE BUT HAS HEARD THERE ARE SIDE EFFECTS FOR SOMEONE WHO HAS HAD BELLS PALSY. HE WOULD LIKE ADVICE.    PLEASE CALL PATIENT -[phone].

## 2021-02-08 ENCOUNTER — TELEPHONE (OUTPATIENT)
Dept: INTERNAL MEDICINE | Facility: CLINIC | Age: 81
End: 2021-02-08

## 2021-02-08 NOTE — TELEPHONE ENCOUNTER
PT CALLED STATING HE NEEDS US TO MAKE AN APPT FOR HIM AT THE [facility] NEAR HIS HOME, WHERE HE ALWAYS GOES.    HE NEEDS TO BE SCHEDULE A FEW DAYS BEFORE APPT ON FEB 26.    PLEASE ADVISE.    CALLBACK NUMBER: [phone]

## 2021-02-12 RX ORDER — METFORMIN HYDROCHLORIDE 500 MG/1
TABLET, EXTENDED RELEASE ORAL
Qty: 360 TABLET | Refills: 1 | Status: SHIPPED | OUTPATIENT
Start: 2021-02-12 | End: 2022-03-25

## 2021-02-12 RX ORDER — DAPAGLIFLOZIN 10 MG/1
TABLET, FILM COATED ORAL
Qty: 90 TABLET | Refills: 1 | Status: SHIPPED | OUTPATIENT
Start: 2021-02-12 | End: 2021-04-08

## 2021-02-22 RX ORDER — CARVEDILOL 12.5 MG/1
TABLET ORAL
Qty: 180 TABLET | Refills: 1 | Status: SHIPPED | OUTPATIENT
Start: 2021-02-22 | End: 2021-08-17

## 2021-02-26 ENCOUNTER — OFFICE VISIT (OUTPATIENT)
Dept: INTERNAL MEDICINE | Facility: CLINIC | Age: 81
End: 2021-02-26

## 2021-02-26 VITALS
WEIGHT: 149.1 LBS | DIASTOLIC BLOOD PRESSURE: 70 MMHG | OXYGEN SATURATION: 99 % | BODY MASS INDEX: 21.35 KG/M2 | HEART RATE: 74 BPM | HEIGHT: 70 IN | SYSTOLIC BLOOD PRESSURE: 128 MMHG

## 2021-02-26 DIAGNOSIS — I15.9 SECONDARY HYPERTENSION: ICD-10-CM

## 2021-02-26 DIAGNOSIS — E78.5 HYPERLIPIDEMIA, UNSPECIFIED HYPERLIPIDEMIA TYPE: Primary | ICD-10-CM

## 2021-02-26 DIAGNOSIS — E11.65 TYPE 2 DIABETES MELLITUS WITH HYPERGLYCEMIA, WITHOUT LONG-TERM CURRENT USE OF INSULIN (HCC): ICD-10-CM

## 2021-02-26 PROCEDURE — 99214 OFFICE O/P EST MOD 30 MIN: CPT | Performed by: INTERNAL MEDICINE

## 2021-02-26 RX ORDER — LANCING DEVICE
1 EACH MISCELLANEOUS 2 TIMES DAILY
Qty: 200 EACH | Refills: 3 | Status: SHIPPED | OUTPATIENT
Start: 2021-02-26 | End: 2021-03-02 | Stop reason: SDUPTHER

## 2021-02-26 RX ORDER — GLIMEPIRIDE 2 MG/1
2 TABLET ORAL
Qty: 30 TABLET | Refills: 3 | Status: SHIPPED | OUTPATIENT
Start: 2021-02-26 | End: 2021-04-13

## 2021-02-26 NOTE — PROGRESS NOTES
Cece Teixeira is a 80 y.o. male here today to f/u on DM 2 and hyperlipidemia.      History of Present Illness   Pt has been taking cholesterol meds as prescribed.  No difficulties with myalgias.   He never has a low suigar  He has no sx with the high sugars  He has not been good with his diet  Pt has been taking BP meds as prescribed without any problems.  No HA  No episodes of orthostasis    The following portions of the patient's history were reviewed and updated as appropriate: allergies, current medications, past medical history, past social history and problem list.  He is working on diet  No reg exercise      Review of Systems   All other systems reviewed and are negative.      Objective   Physical Exam  Vitals signs reviewed.   Constitutional:       Appearance: He is well-developed.   HENT:      Head: Normocephalic and atraumatic.      Right Ear: External ear normal.      Left Ear: External ear normal.   Eyes:      Conjunctiva/sclera: Conjunctivae normal.      Pupils: Pupils are equal, round, and reactive to light.   Neck:      Musculoskeletal: Normal range of motion.      Thyroid: No thyromegaly.      Trachea: No tracheal deviation.   Cardiovascular:      Rate and Rhythm: Normal rate and regular rhythm.      Heart sounds: Normal heart sounds.   Pulmonary:      Effort: Pulmonary effort is normal.      Breath sounds: Normal breath sounds.   Abdominal:      General: Bowel sounds are normal. There is no distension.      Palpations: Abdomen is soft.      Tenderness: There is no abdominal tenderness.   Musculoskeletal: Normal range of motion.         General: No deformity.   Skin:     General: Skin is warm and dry.   Neurological:      Mental Status: He is alert and oriented to person, place, and time.   Psychiatric:         Behavior: Behavior normal.         Thought Content: Thought content normal.         Judgment: Judgment normal.         Vitals:    02/26/21 0927   BP: 128/70   Pulse: 74   SpO2:  99%     Body mass index is 21.39 kg/m².       Results Encounter on 02/25/2021   Component Date Value Ref Range Status   • Glucose 02/22/2021 143* 65 - 99 mg/dL Final   • BUN 02/22/2021 15  8 - 27 mg/dL Final   • Creatinine 02/22/2021 0.90  0.76 - 1.27 mg/dL Final   • eGFR Non  Am 02/22/2021 81  >59 mL/min/1.73 Final   • eGFR African Am 02/22/2021 94  >59 mL/min/1.73 Final   • BUN/Creatinine Ratio 02/22/2021 17  10 - 24 Final   • Sodium 02/22/2021 143  134 - 144 mmol/L Final   • Potassium 02/22/2021 3.9  3.5 - 5.2 mmol/L Final   • Chloride 02/22/2021 104  96 - 106 mmol/L Final   • Total CO2 02/22/2021 25  20 - 29 mmol/L Final   • Calcium 02/22/2021 9.2  8.6 - 10.2 mg/dL Final   • Total Protein 02/22/2021 6.1  6.0 - 8.5 g/dL Final   • Albumin 02/22/2021 4.5  3.7 - 4.7 g/dL Final   • Globulin 02/22/2021 1.6  1.5 - 4.5 g/dL Final   • A/G Ratio 02/22/2021 2.8* 1.2 - 2.2 Final   • Total Bilirubin 02/22/2021 0.8  0.0 - 1.2 mg/dL Final   • Alkaline Phosphatase 02/22/2021 82  39 - 117 IU/L Final   • AST (SGOT) 02/22/2021 22  0 - 40 IU/L Final   • ALT (SGPT) 02/22/2021 16  0 - 44 IU/L Final   • Hemoglobin A1C 02/22/2021 9.6* 4.8 - 5.6 % Final    Comment:          Prediabetes: 5.7 - 6.4           Diabetes: >6.4           Glycemic control for adults with diabetes: <7.0     • Total Cholesterol 02/22/2021 99* 100 - 199 mg/dL Final   • Triglycerides 02/22/2021 75  0 - 149 mg/dL Final   • HDL Cholesterol 02/22/2021 38* >39 mg/dL Final   • VLDL Cholesterol Giovanny 02/22/2021 16  5 - 40 mg/dL Final   • LDL Chol Calc ([facility]) 02/22/2021 45  0 - 99 mg/dL Final   • LDL/HDL RATIO 02/22/2021 1.2  0.0 - 3.6 ratio Final    Comment:                                     LDL/HDL Ratio                                              Men  Women                                1/2 Avg.Risk  1.0    1.5                                    Avg.Risk  3.6    3.2                                 2X Avg.Risk  6.2    5.0                                 3X  Avg.Risk  8.0    6.1         Current Outpatient Medications:   •  Acetaminophen (TYLENOL ARTHRITIS PAIN PO), Take 1 tablet by mouth 2 (Two) Times a Day., Disp: , Rfl:   •  atorvastatin (LIPITOR) 80 MG tablet, Take 1 tablet by mouth Every Night., Disp: 90 tablet, Rfl: 3  •  Blood Glucose Monitoring Suppl (ONE TOUCH ULTRA 2) w/Device kit, 1 each by Other route See Admin Instructions., Disp: 1 each, Rfl: 0  •  carvedilol (COREG) 12.5 MG tablet, TAKE 1 TABLET BY MOUTH TWICE A DAY WITH MEALS, Disp: 180 tablet, Rfl: 1  •  ELIQUIS 5 MG tablet tablet, Take 1 tablet by mouth 2 (Two) Times a Day., Disp: 180 tablet, Rfl: 3  •  Farxiga 10 MG tablet, TAKE 1 TABLET BY MOUTH EVERY DAY, Disp: 90 tablet, Rfl: 1  •  glucose blood (PHARMACIST CHOICE AUTOCODE) test strip, 1 each by Other route Daily. Use as instructed, Disp: 100 each, Rfl: 3  •  linagliptin (TRADJENTA) 5 MG tablet tablet, Take 1 tablet by mouth Daily., Disp: 90 tablet, Rfl: 3  •  metFORMIN ER (GLUCOPHAGE-XR) 500 MG 24 hr tablet, TAKE 2 TABLETS BY MOUTH TWICE A DAY, Disp: 360 tablet, Rfl: 1  •  Multiple Vitamins-Minerals (PRESERVISION AREDS) capsule, Take 1 tablet by mouth 2 (Two) Times a Day., Disp: , Rfl:   •  PHARMACIST CHOICE LANCETS misc, 1 each 2 (Two) Times a Day., Disp: 200 each, Rfl: 3  •  glimepiride (Amaryl) 2 MG tablet, Take 1 tablet by mouth Every Morning Before Breakfast., Disp: 30 tablet, Rfl: 3      Assessment/Plan   Diagnoses and all orders for this visit:    1. Hyperlipidemia, unspecified hyperlipidemia type (Primary)    2. Secondary hypertension    3. Type 2 diabetes mellitus with hyperglycemia, without long-term current use of insulin (CMS/[facility])    Other orders  -     glimepiride (Amaryl) 2 MG tablet; Take 1 tablet by mouth Every Morning Before Breakfast.  Dispense: 30 tablet; Refill: 3        1.  HPL-ok with current meds  2.  DM-  He does not want insulin.  He wants to try amaryl.   A rash years ago after sulfa but wants to try  3.  HTN- ok with  current meds

## 2021-03-02 RX ORDER — LANCING DEVICE
1 EACH MISCELLANEOUS DAILY
Qty: 100 EACH | Refills: 3 | Status: SHIPPED | OUTPATIENT
Start: 2021-03-02 | End: 2021-03-08

## 2021-03-02 NOTE — TELEPHONE ENCOUNTER
Caller: Urban Teixeira    Relationship: Self    Best call back number: [phone]    Medication needed:   Requested Prescriptions     Pending Prescriptions Disp Refills   • glucose blood (Pharmacist Choice Autocode) test strip 200 each 3     Si each by Other route 2 (two) times a day.       When do you need the refill by: ASAP    What details did the patient provide when requesting the medication: PHARMACY TOLD PATIENT THEY COULD NOT REFILL THIS WITHOUT HIM BEING ON INSULIN. DR WILL NEED TO CALL OVER TO AUTHORIZE.     Does the patient have less than a 3 day supply:  [] Yes  [x] No    What is the patient's preferred pharmacy: [id number] - [place], [place] - [address] AT [facility] - [phone] [facility] [phone] FX

## 2021-03-02 NOTE — TELEPHONE ENCOUNTER
Pt insurance will not cover for 2 times daily. We have to send it over as once and the pt knows that he needs to check it twice

## 2021-03-08 RX ORDER — BLOOD SUGAR DIAGNOSTIC
STRIP MISCELLANEOUS
Qty: 100 EACH | Refills: 3 | Status: SHIPPED | OUTPATIENT
Start: 2021-03-08 | End: 2022-03-25

## 2021-03-10 RX ORDER — ATORVASTATIN CALCIUM 80 MG/1
TABLET, FILM COATED ORAL
Qty: 90 TABLET | Refills: 1 | Status: SHIPPED | OUTPATIENT
Start: 2021-03-10 | End: 2021-09-03

## 2021-03-25 ENCOUNTER — TELEMEDICINE (OUTPATIENT)
Dept: INTERNAL MEDICINE | Facility: CLINIC | Age: 81
End: 2021-03-25

## 2021-03-25 DIAGNOSIS — E11.65 TYPE 2 DIABETES MELLITUS WITH HYPERGLYCEMIA, WITHOUT LONG-TERM CURRENT USE OF INSULIN (HCC): Primary | ICD-10-CM

## 2021-03-25 PROCEDURE — 99213 OFFICE O/P EST LOW 20 MIN: CPT | Performed by: INTERNAL MEDICINE

## 2021-03-25 NOTE — PROGRESS NOTES
eCce Teixeira is a 80 y.o. male here today to f/u on DM 2.  Pt states that his sugar was 72 this morning.    You have chosen to receive care through a telehealth visit.  Do you consent to use a video/audio connection for your medical care today? Yes      History of Present Illness   He is doing very well with the amaryl    Am fasting average 105  Increases later in the day     The following portions of the patient's history were reviewed and updated as appropriate: allergies, current medications, past medical history, past social history and problem list.  He has been watching his diet more closely.  Review of Systems    Objective   Physical Exam  Vitals reviewed.   Constitutional:       Appearance: He is well-developed.   Neck:      Thyroid: No thyromegaly.      Trachea: No tracheal deviation.   Neurological:      Mental Status: He is alert and oriented to person, place, and time.   Psychiatric:         Behavior: Behavior normal.         Thought Content: Thought content normal.         Judgment: Judgment normal.         There were no vitals filed for this visit.  There is no height or weight on file to calculate BMI.      Current Outpatient Medications:   •  Acetaminophen (TYLENOL ARTHRITIS PAIN PO), Take 1 tablet by mouth 2 (Two) Times a Day., Disp: , Rfl:   •  atorvastatin (LIPITOR) 80 MG tablet, TAKE 1 TABLET BY MOUTH EVERY DAY AT NIGHT, Disp: 90 tablet, Rfl: 1  •  Blood Glucose Monitoring Suppl (ONE TOUCH ULTRA 2) w/Device kit, 1 each by Other route See Admin Instructions., Disp: 1 each, Rfl: 0  •  carvedilol (COREG) 12.5 MG tablet, TAKE 1 TABLET BY MOUTH TWICE A DAY WITH MEALS, Disp: 180 tablet, Rfl: 1  •  ELIQUIS 5 MG tablet tablet, Take 1 tablet by mouth 2 (Two) Times a Day., Disp: 180 tablet, Rfl: 3  •  Farxiga 10 MG tablet, TAKE 1 TABLET BY MOUTH EVERY DAY, Disp: 90 tablet, Rfl: 1  •  glimepiride (Amaryl) 2 MG tablet, Take 1 tablet by mouth Every Morning Before Breakfast., Disp: 30 tablet,  Rfl: 3  •  glucose blood (OneTouch Ultra) test strip, USE ONE STRIP DAILY FOR DIABETES E11.9, Disp: 100 each, Rfl: 3  •  linagliptin (TRADJENTA) 5 MG tablet tablet, Take 1 tablet by mouth Daily., Disp: 90 tablet, Rfl: 3  •  metFORMIN ER (GLUCOPHAGE-XR) 500 MG 24 hr tablet, TAKE 2 TABLETS BY MOUTH TWICE A DAY, Disp: 360 tablet, Rfl: 1  •  Multiple Vitamins-Minerals (PRESERVISION AREDS) capsule, Take 1 tablet by mouth 2 (Two) Times a Day., Disp: , Rfl:   •  PHARMACIST CHOICE LANCETS misc, 1 each 2 (Two) Times a Day., Disp: 200 each, Rfl: 3       Assessment/Plan   Diagnoses and all orders for this visit:    1. Type 2 diabetes mellitus with hyperglycemia, without long-term current use of insulin (CMS/[facility]) (Primary)      1.  Diabetes mellitus: His sugars are significantly better.  He is responding very well to Amaryl and we need to watch him closely to make sure he does not have any episodes of hypoglycemia.  He will likely stop the Farxiga and hopefully the Tradjenta as those were both very expensive for him.  We will see him back in 2 months with labs

## 2021-04-08 ENCOUNTER — OFFICE VISIT (OUTPATIENT)
Dept: INTERNAL MEDICINE | Facility: CLINIC | Age: 81
End: 2021-04-08

## 2021-04-08 VITALS
DIASTOLIC BLOOD PRESSURE: 76 MMHG | HEIGHT: 70 IN | BODY MASS INDEX: 22.07 KG/M2 | OXYGEN SATURATION: 100 % | SYSTOLIC BLOOD PRESSURE: 120 MMHG | WEIGHT: 154.2 LBS | TEMPERATURE: 97.1 F | HEART RATE: 72 BPM

## 2021-04-08 DIAGNOSIS — E11.65 TYPE 2 DIABETES MELLITUS WITH HYPERGLYCEMIA, WITHOUT LONG-TERM CURRENT USE OF INSULIN (HCC): ICD-10-CM

## 2021-04-08 DIAGNOSIS — Z98.61 S/P PTCA (PERCUTANEOUS TRANSLUMINAL CORONARY ANGIOPLASTY): ICD-10-CM

## 2021-04-08 DIAGNOSIS — I48.0 PAROXYSMAL ATRIAL FIBRILLATION (HCC): Primary | ICD-10-CM

## 2021-04-08 PROBLEM — I21.4 NSTEMI (NON-ST ELEVATED MYOCARDIAL INFARCTION) (HCC): Status: ACTIVE | Noted: 2021-03-31

## 2021-04-08 PROCEDURE — 99214 OFFICE O/P EST MOD 30 MIN: CPT | Performed by: INTERNAL MEDICINE

## 2021-04-08 RX ORDER — CLOPIDOGREL BISULFATE 75 MG/1
75 TABLET ORAL DAILY
COMMUNITY
Start: 2021-04-02 | End: 2022-11-08

## 2021-04-08 NOTE — PROGRESS NOTES
Cece Teixeira is a 80 y.o. male here today to f/u from the hospital for a heart attack.      History of Present Illness   1 week ago patient started having angina while sitting and watchng TV  Called EMS and was added for heart attack  He did well with a stent into the svg  He is on plavix and will remain on eloquis    The following portions of the patient's history were reviewed and updated as appropriate: allergies, current medications, past medical history, past social history and problem list.  He denies any falls  He is eating ok    Review of Systems   All other systems reviewed and are negative.      Objective   Physical Exam  Vitals reviewed.   Constitutional:       Appearance: He is well-developed.   HENT:      Head: Normocephalic and atraumatic.      Right Ear: External ear normal.      Left Ear: External ear normal.   Eyes:      Conjunctiva/sclera: Conjunctivae normal.      Pupils: Pupils are equal, round, and reactive to light.   Neck:      Thyroid: No thyromegaly.      Trachea: No tracheal deviation.   Cardiovascular:      Rate and Rhythm: Normal rate and regular rhythm.      Heart sounds: Normal heart sounds.   Pulmonary:      Effort: Pulmonary effort is normal.      Breath sounds: Normal breath sounds.   Abdominal:      General: Bowel sounds are normal. There is no distension.      Palpations: Abdomen is soft.      Tenderness: There is no abdominal tenderness.   Musculoskeletal:         General: No deformity. Normal range of motion.      Cervical back: Normal range of motion.   Skin:     General: Skin is warm and dry.   Neurological:      Mental Status: He is alert and oriented to person, place, and time.   Psychiatric:         Behavior: Behavior normal.         Thought Content: Thought content normal.         Judgment: Judgment normal.         Vitals:    04/08/21 0808   BP: 120/76   Pulse: 72   Temp: 97.1 °F (36.2 °C)   SpO2: 100%     Body mass index is 22.13 kg/m².       Current  Outpatient Medications:   •  Acetaminophen (TYLENOL ARTHRITIS PAIN PO), Take 1 tablet by mouth 2 (Two) Times a Day., Disp: , Rfl:   •  atorvastatin (LIPITOR) 80 MG tablet, TAKE 1 TABLET BY MOUTH EVERY DAY AT NIGHT, Disp: 90 tablet, Rfl: 1  •  Blood Glucose Monitoring Suppl (ONE TOUCH ULTRA 2) w/Device kit, 1 each by Other route See Admin Instructions., Disp: 1 each, Rfl: 0  •  carvedilol (COREG) 12.5 MG tablet, TAKE 1 TABLET BY MOUTH TWICE A DAY WITH MEALS, Disp: 180 tablet, Rfl: 1  •  clopidogrel (PLAVIX) 75 MG tablet, Take 75 mg by mouth Daily., Disp: , Rfl:   •  ELIQUIS 5 MG tablet tablet, Take 1 tablet by mouth 2 (Two) Times a Day., Disp: 180 tablet, Rfl: 3  •  glimepiride (Amaryl) 2 MG tablet, Take 1 tablet by mouth Every Morning Before Breakfast., Disp: 30 tablet, Rfl: 3  •  glucose blood (OneTouch Ultra) test strip, USE ONE STRIP DAILY FOR DIABETES E11.9, Disp: 100 each, Rfl: 3  •  linagliptin (TRADJENTA) 5 MG tablet tablet, Take 1 tablet by mouth Daily., Disp: 90 tablet, Rfl: 3  •  metFORMIN ER (GLUCOPHAGE-XR) 500 MG 24 hr tablet, TAKE 2 TABLETS BY MOUTH TWICE A DAY, Disp: 360 tablet, Rfl: 1  •  Multiple Vitamins-Minerals (PRESERVISION AREDS) capsule, Take 1 tablet by mouth 2 (Two) Times a Day., Disp: , Rfl:   •  PHARMACIST CHOICE LANCETS misc, 1 each 2 (Two) Times a Day., Disp: 200 each, Rfl: 3  As per path report from [facility] 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  This was stented successfully    Assessment/Plan   Diagnoses and all orders for this visit:    1. Paroxysmal atrial fibrillation (CMS/HCC) (Primary)    2. S/P PTCA (percutaneous transluminal coronary angioplasty)  -     Hemoglobin A1c; Future  -     Comprehensive Metabolic Panel; Future  -     LP+LDL / HDL Ratio ([facility]); Future    3. Type 2 diabetes mellitus with hyperglycemia, without long-term current use of insulin (CMS/HCC)  -     Hemoglobin A1c; Future  -     Comprehensive Metabolic Panel; Future  -     LP+LDL  / HDL Ratio ([facility]); Future          1. CAD-  S/p stent to SVG  He is doing well with this  No CP  He does occas have ortho stasis but is cautious.  He will soon start cardiac rehab.  2.  Anticoagulation-  Cont eloquis and Plavix  Discussed at length fall risk with this and balance exercises to help with this  No fall  3.  DM-  Doing much better with amaryl  No low sugars  He is waltching diet

## 2021-04-08 NOTE — PATIENT INSTRUCTIONS
Sit-to-Stand Exercise    The sit-to-stand exercise (also known as the chair stand or chair rise exercise) strengthens your lower body and helps you maintain or improve your mobility and independence. The goal is to do the sit-to-stand exercise without using your hands. This will be easier as you become stronger. You should always talk with your health care provider before starting any exercise program, especially if you have had recent surgery.  Do the exercise exactly as told by your health care provider and adjust it as directed. It is normal to feel mild stretching, pulling, tightness, or discomfort as you do this exercise, but you should stop right away if you feel sudden pain or your pain gets worse. Do not begin doing this exercise until told by your health care provider.  What the sit-to-stand exercise does  The sit-to-stand exercise helps to strengthen the muscles in your thighs and the muscles in the center of your body that give you stability (core muscles). This exercise is especially helpful if:  · You have had knee or hip surgery.  · You have trouble getting up from a chair, out of a car, or off the toilet.  How to do the sit-to-stand exercise  1. Sit toward the front edge of a sturdy chair without armrests. Your knees should be bent and your feet should be flat on the floor and shoulder-width apart.  2. Place your hands lightly on each side of the seat. Keep your back and neck as straight as possible, with your chest slightly forward.  3. Breathe in slowly. Lean forward and slightly shift your weight to the front of your feet.  4. Breathe out as you slowly stand up. Use your hands as little as possible.  5. Stand and pause for a full breath in and out.  6. Breathe in as you sit down slowly. Tighten your core and abdominal muscles to control your lowering as much as possible.  7. Breathe out slowly.  8. Do this exercise 10-15 times. If needed, do it fewer times until you build up strength.  9. Rest for  1 minute, then do another set of 10-15 repetitions.  To change the difficulty of the sit-to-stand exercise  · If the exercise is too difficult, use a chair with sturdy armrests, and push off the armrests to help you come to the standing position. You can also use the armrests to help slowly lower yourself back to sitting. As this gets easier, try to use your arms less. You can also place a firm cushion or pillow on the chair to make the surface higher.  · If this exercise is too easy, do not use your arms to help raise or lower yourself. You can also wear a weighted vest, use hand weights, increase your repetitions, or try a lower chair.  General tips  · You may feel tired when starting an exercise routine. This is normal.  · You may have muscle soreness that lasts a few days. This is normal. As you get stronger, you may not feel muscle soreness.  · Use smooth, steady movements.  · Do not  hold your breath during strength exercises. This can cause unsafe changes in your blood pressure.  · Breathe in slowly through your nose, and breathe out slowly through your mouth.  Summary  · Strengthening your lower body is an important step to help you move safely and independently.  · The sit-to-stand exercise helps strengthen the muscles in your thighs and core.  · You should always talk with your health care provider before starting any exercise program, especially if you have had recent surgery.  This information is not intended to replace advice given to you by your health care provider. Make sure you discuss any questions you have with your health care provider.  Document Revised: 10/16/2019 Document Reviewed: 02/08/2018  Elsevier Patient Education © 2021 [employer]

## 2021-04-14 RX ORDER — LINAGLIPTIN 5 MG/1
TABLET, FILM COATED ORAL
Qty: 90 TABLET | Refills: 1 | Status: SHIPPED | OUTPATIENT
Start: 2021-04-14 | End: 2021-11-08

## 2021-04-14 RX ORDER — APIXABAN 5 MG/1
TABLET, FILM COATED ORAL
Qty: 180 TABLET | Refills: 1 | Status: SHIPPED | OUTPATIENT
Start: 2021-04-14 | End: 2021-12-16

## 2021-04-14 RX ORDER — GLIMEPIRIDE 2 MG/1
TABLET ORAL
Qty: 90 TABLET | Refills: 1 | Status: SHIPPED | OUTPATIENT
Start: 2021-04-14 | End: 2021-11-15

## 2021-06-15 ENCOUNTER — OFFICE VISIT (OUTPATIENT)
Dept: INTERNAL MEDICINE | Facility: CLINIC | Age: 81
End: 2021-06-15

## 2021-06-15 VITALS
SYSTOLIC BLOOD PRESSURE: 132 MMHG | HEART RATE: 63 BPM | WEIGHT: 159.9 LBS | BODY MASS INDEX: 22.89 KG/M2 | DIASTOLIC BLOOD PRESSURE: 76 MMHG | OXYGEN SATURATION: 99 % | TEMPERATURE: 97.5 F | HEIGHT: 70 IN

## 2021-06-15 DIAGNOSIS — E11.65 TYPE 2 DIABETES MELLITUS WITH HYPERGLYCEMIA, WITHOUT LONG-TERM CURRENT USE OF INSULIN (HCC): ICD-10-CM

## 2021-06-15 DIAGNOSIS — I15.9 SECONDARY HYPERTENSION: ICD-10-CM

## 2021-06-15 DIAGNOSIS — E78.5 HYPERLIPIDEMIA, UNSPECIFIED HYPERLIPIDEMIA TYPE: ICD-10-CM

## 2021-06-15 DIAGNOSIS — Z00.00 MEDICARE ANNUAL WELLNESS VISIT, SUBSEQUENT: Primary | ICD-10-CM

## 2021-06-15 PROCEDURE — G0439 PPPS, SUBSEQ VISIT: HCPCS | Performed by: INTERNAL MEDICINE

## 2021-06-15 PROCEDURE — 99213 OFFICE O/P EST LOW 20 MIN: CPT | Performed by: INTERNAL MEDICINE

## 2021-06-15 NOTE — PATIENT INSTRUCTIONS
Medicare Wellness  Personal Prevention Plan of Service     Date of Office Visit:  06/15/2021  Encounter Provider:  Carmela Sweet MD  Place of Service:  [facility]  Patient Name: Urban Teixeira  :  1940    As part of the Medicare Wellness portion of your visit today, we are providing you with this personalized preventive plan of services (PPPS). This plan is based upon recommendations of the [place] Preventive Services Task Force (USPSTF) and the Advisory Committee on Immunization Practices (ACIP).    This lists the preventive care services that should be considered, and provides dates of when you are due. Items listed as completed are up-to-date and do not require any further intervention.    Health Maintenance   Topic Date Due   • ZOSTER VACCINE (1 of 2) Never done   • ANNUAL WELLNESS VISIT  2021   • DIABETIC EYE EXAM  2021   • INFLUENZA VACCINE  2021   • HEMOGLOBIN A1C  2021   • LIPID PANEL  2022   • URINE MICROALBUMIN  2022   • TDAP/TD VACCINES (2 - Td or Tdap) 2024   • COLORECTAL CANCER SCREENING  2029   • HEPATITIS C SCREENING  Completed   • COVID-19 Vaccine  Completed   • Pneumococcal Vaccine 65+  Completed       No orders of the defined types were placed in this encounter.      No follow-ups on file.

## 2021-06-15 NOTE — PROGRESS NOTES
The ABCs of the Annual Wellness Visit  Subsequent Medicare Wellness Visit    No chief complaint on file.      Subjective   History of Present Illness:  Urban Texieira is a 80 y.o. male who presents for a Subsequent Medicare Wellness Visit.    HEALTH RISK ASSESSMENT    Recent Hospitalizations:  No hospitalization(s) within the last year.    Current Medical Providers:  Patient Care Team:  Carmela Sweet MD as PCP - General (Internal Medicine)    Smoking Status:  Social History     Tobacco Use   Smoking Status Never Smoker   Smokeless Tobacco Never Used       Alcohol Consumption:  Social History     Substance and Sexual Activity   Alcohol Use No       Depression Screen:   PHQ-2/PHQ-9 Depression Screening 6/15/2021   Little interest or pleasure in doing things 0   Feeling down, depressed, or hopeless 0   Trouble falling or staying asleep, or sleeping too much 3   Feeling tired or having little energy 1   Poor appetite or overeating 0   Feeling bad about yourself - or that you are a failure or have let yourself or your family down 0   Trouble concentrating on things, such as reading the newspaper or watching television 0   Moving or speaking so slowly that other people could have noticed. Or the opposite - being so fidgety or restless that you have been moving around a lot more than usual 0   Thoughts that you would be better off dead, or of hurting yourself in some way 0   Total Score 4   If you checked off any problems, how difficult have these problems made it for you to do your work, take care of things at home, or get along with other people? Not difficult at all       Fall Risk Screen:  STEADI Fall Risk Assessment was completed, and patient is at LOW risk for falls.Assessment completed on:6/15/2021    Health Habits and Functional and Cognitive Screening:  Functional & Cognitive Status 6/15/2021   Do you have difficulty preparing food and eating? No   Do you have difficulty bathing yourself, getting dressed or  grooming yourself? No   Do you have difficulty using the toilet? No   Do you have difficulty moving around from place to place? No   Do you have trouble with steps or getting out of a bed or a chair? Yes   Current Diet Diabetic Diet   Dental Exam Up to date   Eye Exam Up to date   Exercise (times per week) 7 times per week   Current Exercises Include Walking   Current Exercise Activities Include -   Do you need help using the phone?  No   Are you deaf or do you have serious difficulty hearing?  No   Do you need help with transportation? No   Do you need help shopping? No   Do you need help preparing meals?  No   Do you need help with housework?  No   Do you need help with laundry? No   Do you need help taking your medications? No   Do you need help managing money? No   Do you ever drive or ride in a car without wearing a seat belt? No   Have you felt unusual stress, anger or loneliness in the last month? No   Who do you live with? Spouse   If you need help, do you have trouble finding someone available to you? No   Have you been bothered in the last four weeks by sexual problems? No   Do you have difficulty concentrating, remembering or making decisions? No         Does the patient have evidence of cognitive impairment? No    Asprin use counseling:Does not need ASA (and currently is not on it)    Age-appropriate Screening Schedule:  Refer to the list below for future screening recommendations based on patient's age, sex and/or medical conditions. Orders for these recommended tests are listed in the plan section. The patient has been provided with a written plan.    Health Maintenance   Topic Date Due   • ZOSTER VACCINE (1 of 2) Never done   • DIABETIC EYE EXAM  07/13/2021   • INFLUENZA VACCINE  08/01/2021   • HEMOGLOBIN A1C  12/08/2021   • LIPID PANEL  06/08/2022   • URINE MICROALBUMIN  06/08/2022   • TDAP/TD VACCINES (2 - Td or Tdap) 01/01/2024          The following portions of the patient's history were reviewed  and updated as appropriate: allergies, current medications, past medical history, past social history and problem list.    Outpatient Medications Prior to Visit   Medication Sig Dispense Refill   • Acetaminophen (TYLENOL ARTHRITIS PAIN PO) Take 1 tablet by mouth 2 (Two) Times a Day.     • atorvastatin (LIPITOR) 80 MG tablet TAKE 1 TABLET BY MOUTH EVERY DAY AT NIGHT 90 tablet 1   • Blood Glucose Monitoring Suppl (ONE TOUCH ULTRA 2) w/Device kit 1 each by Other route See Admin Instructions. 1 each 0   • carvedilol (COREG) 12.5 MG tablet TAKE 1 TABLET BY MOUTH TWICE A DAY WITH MEALS 180 tablet 1   • clopidogrel (PLAVIX) 75 MG tablet Take 75 mg by mouth Daily.     • Eliquis 5 MG tablet tablet TAKE 1 TABLET BY MOUTH TWICE A  tablet 1   • glimepiride (AMARYL) 2 MG tablet TAKE 1 TABLET BY MOUTH EVERY DAY BEFORE BREAKFAST 90 tablet 1   • glucose blood (OneTouch Ultra) test strip USE ONE STRIP DAILY FOR DIABETES E11.9 100 each 3   • metFORMIN ER (GLUCOPHAGE-XR) 500 MG 24 hr tablet TAKE 2 TABLETS BY MOUTH TWICE A  tablet 1   • Multiple Vitamins-Minerals (PRESERVISION AREDS) capsule Take 1 tablet by mouth 2 (Two) Times a Day.     • PHARMACIST CHOICE LANCETS misc 1 each 2 (Two) Times a Day. 200 each 3   • Tradjenta 5 MG tablet tablet TAKE 1 TABLET BY MOUTH EVERY DAY 90 tablet 1     No facility-administered medications prior to visit.       Patient Active Problem List   Diagnosis   • Bladder retention   • Hernia, inguinal, right   • Cervical disc disorder with radiculopathy of mid-cervical region   • Degeneration of intervertebral disc of mid-cervical region   • Herniated cervical disc   • Coronary artery disease   • Hypertension   • Type 2 diabetes mellitus with hyperglycemia (CMS/HCC)   • Myocardial infarction (old)   • Macular degeneration   • Hyperlipidemia   • S/P CABG x 3   • S/P PTCA (percutaneous transluminal coronary angioplasty)   • Paroxysmal atrial fibrillation (CMS/HCC)   • Bell palsy   • NSTEMI  "(non-ST elevated myocardial infarction) (CMS/[facility])       Advanced Care Planning:  ACP discussion was held with the patient during this visit. Patient has an advance directive (not in EMR), copy requested.    Review of Systems    Compared to one year ago, the patient feels his physical health is better.  Compared to one year ago, the patient feels his mental health is the same.    Reviewed chart for potential of high risk medication in the elderly: yes  Reviewed chart for potential of harmful drug interactions in the elderly:yes    Objective         Vitals:    06/15/21 1118   BP: 132/76   BP Location: Left arm   Patient Position: Sitting   Pulse: 63   Temp: 97.5 °F (36.4 °C)   TempSrc: Infrared   SpO2: 99%   Weight: 72.5 kg (159 lb 14.4 oz)   Height: 177.8 cm (70\")   PainSc:   2       Body mass index is 22.94 kg/m².  Discussed the patient's BMI with him. The BMI is in the acceptable range.    Physical Exam    Lab Results   Component Value Date     (H) 06/08/2021    CHLPL 115 06/08/2021    TRIG 86 06/08/2021    HDL 46 06/08/2021    LDL 52 06/08/2021    VLDL 17 06/08/2021    HGBA1C 7.2 (H) 06/08/2021    HGBA1C 8.4 (H) 03/31/2021        Assessment/Plan   Medicare Risks and Personalized Health Plan  CMS Preventative Services Quick Reference  Immunizations Discussed/Encouraged (specific immunizations; Shingrix )    The above risks/problems have been discussed with the patient.  Pertinent information has been shared with the patient in the After Visit Summary.  Follow up plans and orders are seen below in the Assessment/Plan Section.    Diagnoses and all orders for this visit:    1. Medicare annual wellness visit, subsequent (Primary)    2. Type 2 diabetes mellitus with hyperglycemia, without long-term current use of insulin (CMS/[facility])  -     CBC & Differential; Future  -     Comprehensive Metabolic Panel; Future  -     LP+LDL / HDL Ratio ([facility]); Future  -     TSH Rfx On Abnormal To Free T4; Future  -     " Hemoglobin A1c; Future    3. Secondary hypertension  -     CBC & Differential; Future  -     Comprehensive Metabolic Panel; Future  -     LP+LDL / HDL Ratio ([facility]); Future  -     TSH Rfx On Abnormal To Free T4; Future  -     Hemoglobin A1c; Future    4. Hyperlipidemia, unspecified hyperlipidemia type  -     CBC & Differential; Future  -     Comprehensive Metabolic Panel; Future  -     LP+LDL / HDL Ratio ([facility]); Future  -     TSH Rfx On Abnormal To Free T4; Future  -     Hemoglobin A1c; Future      Follow Up:  Return in about 6 months (around 12/15/2021).     An After Visit Summary and PPPS were given to the patient.

## 2021-06-15 NOTE — PROGRESS NOTES
Cece Teixeira is a 80 y.o. male here today to f/u on DM 2, hyperlipidemia and PAF.  Pt c/o weird sensation on the right side of his head.      History of Present Illness   Pt has been compliant with diabetes meds. No side effects from medication No episodes of hypoglycemia. Patient denies any polyuria or polydipsia  Doing well with amaryl  He has been having an odd tnging on the right side of the head on and off  No change in hearing  No ear pain  He does hicupp freq when changes position for th e past few months    The following portions of the patient's history were reviewed and updated as appropriate: allergies, current medications, past medical history, past social history and problem list.  No tob no etoh  He does walk 4 miles a day    Review of Systems    Objective   Physical Exam  Vitals reviewed.   Constitutional:       Appearance: He is well-developed.   HENT:      Head: Normocephalic and atraumatic.      Right Ear: External ear normal.      Left Ear: External ear normal.   Eyes:      Conjunctiva/sclera: Conjunctivae normal.      Pupils: Pupils are equal, round, and reactive to light.   Neck:      Thyroid: No thyromegaly.      Trachea: No tracheal deviation.   Cardiovascular:      Rate and Rhythm: Normal rate and regular rhythm.      Heart sounds: Normal heart sounds.   Pulmonary:      Effort: Pulmonary effort is normal.      Breath sounds: Normal breath sounds.   Abdominal:      General: Bowel sounds are normal. There is no distension.      Palpations: Abdomen is soft.      Tenderness: There is no abdominal tenderness.   Musculoskeletal:         General: No deformity. Normal range of motion.      Cervical back: Normal range of motion.   Skin:     General: Skin is warm and dry.   Neurological:      Mental Status: He is alert and oriented to person, place, and time.   Psychiatric:         Behavior: Behavior normal.         Thought Content: Thought content normal.         Judgment: Judgment  normal.         Vitals:    06/15/21 1118   BP: 132/76   Pulse: 63   Temp: 97.5 °F (36.4 °C)   SpO2: 99%     Body mass index is 22.94 kg/m².    Results Encounter on 05/24/2021   Component Date Value Ref Range Status   • Creatinine, Urine 06/08/2021 80.4  Not Estab. mg/dL Final   • Microalbumin, Urine 06/08/2021 10.9  Not Estab. ug/mL Final   • Microalbumin/Creatinine Ratio 06/08/2021 14  0 - 29 mg/g creat Final    Comment:                        Normal:                0 -  29                         Moderately increased: 30 - 300                         Severely increased:       >300     • WBC 06/08/2021 4.5  3.4 - 10.8 x10E3/uL Final   • RBC 06/08/2021 4.58  4.14 - 5.80 x10E6/uL Final   • Hemoglobin 06/08/2021 13.9  13.0 - 17.7 g/dL Final   • Hematocrit 06/08/2021 42.9  37.5 - 51.0 % Final   • MCV 06/08/2021 94  79 - 97 fL Final   • MCH 06/08/2021 30.3  26.6 - 33.0 pg Final   • MCHC 06/08/2021 32.4  31.5 - 35.7 g/dL Final   • RDW 06/08/2021 13.1  11.6 - 15.4 % Final   • Platelets 06/08/2021 138* 150 - 450 x10E3/uL Final   • Neutrophil Rel % 06/08/2021 76  Not Estab. % Final   • Lymphocyte Rel % 06/08/2021 16  Not Estab. % Final   • Monocyte Rel % 06/08/2021 6  Not Estab. % Final   • Eosinophil Rel % 06/08/2021 2  Not Estab. % Final   • Basophil Rel % 06/08/2021 0  Not Estab. % Final   • Neutrophils Absolute 06/08/2021 3.3  1.4 - 7.0 x10E3/uL Final   • Lymphocytes Absolute 06/08/2021 0.7  0.7 - 3.1 x10E3/uL Final   • Monocytes Absolute 06/08/2021 0.3  0.1 - 0.9 x10E3/uL Final   • Eosinophils Absolute 06/08/2021 0.1  0.0 - 0.4 x10E3/uL Final   • Basophils Absolute 06/08/2021 0.0  0.0 - 0.2 x10E3/uL Final   • Immature Granulocyte Rel % 06/08/2021 0  Not Estab. % Final   • Immature Grans Absolute 06/08/2021 0.0  0.0 - 0.1 x10E3/uL Final   • Glucose 06/08/2021 111* 65 - 99 mg/dL Final   • BUN 06/08/2021 20  8 - 27 mg/dL Final   • Creatinine 06/08/2021 1.15  0.76 - 1.27 mg/dL Final   • eGFR Non  Am 06/08/2021 60   >59 mL/min/1.73 Final   • eGFR African Am 06/08/2021 70  >59 mL/min/1.73 Final    Comment: **[facility] currently reports eGFR in compliance with the current**    recommendations of the [employer] Foundation. [facility] will    update reporting as new guidelines are published from the NKF-ASN    Task force.     • BUN/Creatinine Ratio 06/08/2021 17  10 - 24 Final   • Sodium 06/08/2021 144  134 - 144 mmol/L Final   • Potassium 06/08/2021 4.7  3.5 - 5.2 mmol/L Final   • Chloride 06/08/2021 105  96 - 106 mmol/L Final   • Total CO2 06/08/2021 24  20 - 29 mmol/L Final   • Calcium 06/08/2021 9.3  8.6 - 10.2 mg/dL Final   • Total Protein 06/08/2021 6.7  6.0 - 8.5 g/dL Final   • Albumin 06/08/2021 4.4  3.7 - 4.7 g/dL Final   • Globulin 06/08/2021 2.3  1.5 - 4.5 g/dL Final   • A/G Ratio 06/08/2021 1.9  1.2 - 2.2 Final   • Total Bilirubin 06/08/2021 1.1  0.0 - 1.2 mg/dL Final   • Alkaline Phosphatase 06/08/2021 77  48 - 121 IU/L Final   • AST (SGOT) 06/08/2021 22  0 - 40 IU/L Final   • ALT (SGPT) 06/08/2021 15  0 - 44 IU/L Final   • Magnesium 06/08/2021 2.1  1.6 - 2.3 mg/dL Final   • TSH 06/08/2021 1.250  0.450 - 4.500 uIU/mL Final   • Hemoglobin A1C 06/08/2021 7.2* 4.8 - 5.6 % Final    Comment:          Prediabetes: 5.7 - 6.4           Diabetes: >6.4           Glycemic control for adults with diabetes: <7.0          Current Outpatient Medications:   •  Acetaminophen (TYLENOL ARTHRITIS PAIN PO), Take 1 tablet by mouth 2 (Two) Times a Day., Disp: , Rfl:   •  atorvastatin (LIPITOR) 80 MG tablet, TAKE 1 TABLET BY MOUTH EVERY DAY AT NIGHT, Disp: 90 tablet, Rfl: 1  •  Blood Glucose Monitoring Suppl (ONE TOUCH ULTRA 2) w/Device kit, 1 each by Other route See Admin Instructions., Disp: 1 each, Rfl: 0  •  carvedilol (COREG) 12.5 MG tablet, TAKE 1 TABLET BY MOUTH TWICE A DAY WITH MEALS, Disp: 180 tablet, Rfl: 1  •  clopidogrel (PLAVIX) 75 MG tablet, Take 75 mg by mouth Daily., Disp: , Rfl:   •  Eliquis 5 MG tablet tablet, TAKE 1 TABLET  BY MOUTH TWICE A DAY, Disp: 180 tablet, Rfl: 1  •  glimepiride (AMARYL) 2 MG tablet, TAKE 1 TABLET BY MOUTH EVERY DAY BEFORE BREAKFAST, Disp: 90 tablet, Rfl: 1  •  glucose blood (OneTouch Ultra) test strip, USE ONE STRIP DAILY FOR DIABETES E11.9, Disp: 100 each, Rfl: 3  •  metFORMIN ER (GLUCOPHAGE-XR) 500 MG 24 hr tablet, TAKE 2 TABLETS BY MOUTH TWICE A DAY, Disp: 360 tablet, Rfl: 1  •  Multiple Vitamins-Minerals (PRESERVISION AREDS) capsule, Take 1 tablet by mouth 2 (Two) Times a Day., Disp: , Rfl:   •  PHARMACIST CHOICE LANCETS misc, 1 each 2 (Two) Times a Day., Disp: 200 each, Rfl: 3  •  Tradjenta 5 MG tablet tablet, TAKE 1 TABLET BY MOUTH EVERY DAY, Disp: 90 tablet, Rfl: 1      Assessment/Plan   Diagnoses and all orders for this visit:    1. Medicare annual wellness visit, subsequent (Primary)    2. Type 2 diabetes mellitus with hyperglycemia, without long-term current use of insulin (CMS/[facility])  -     CBC & Differential; Future  -     Comprehensive Metabolic Panel; Future  -     LP+LDL / HDL Ratio ([facility]); Future  -     TSH Rfx On Abnormal To Free T4; Future  -     Hemoglobin A1c; Future    3. Secondary hypertension  -     CBC & Differential; Future  -     Comprehensive Metabolic Panel; Future  -     LP+LDL / HDL Ratio ([facility]); Future  -     TSH Rfx On Abnormal To Free T4; Future  -     Hemoglobin A1c; Future    4. Hyperlipidemia, unspecified hyperlipidemia type  -     CBC & Differential; Future  -     Comprehensive Metabolic Panel; Future  -     LP+LDL / HDL Ratio ([facility]); Future  -     TSH Rfx On Abnormal To Free T4; Future  -     Hemoglobin A1c; Future    1.  DM-  Doing well with duet and exercise and the amaryl  2.  HPL- ok with current meds  3. HTN- ok with current meds  4.  Hiccup-  With position changes

## 2021-08-17 RX ORDER — CARVEDILOL 12.5 MG/1
TABLET ORAL
Qty: 180 TABLET | Refills: 1 | Status: SHIPPED | OUTPATIENT
Start: 2021-08-17 | End: 2021-12-28

## 2021-09-03 RX ORDER — ATORVASTATIN CALCIUM 80 MG/1
TABLET, FILM COATED ORAL
Qty: 90 TABLET | Refills: 1 | Status: SHIPPED | OUTPATIENT
Start: 2021-09-03 | End: 2021-12-28

## 2021-11-08 RX ORDER — LINAGLIPTIN 5 MG/1
TABLET, FILM COATED ORAL
Qty: 90 TABLET | Refills: 1 | Status: SHIPPED | OUTPATIENT
Start: 2021-11-08 | End: 2022-05-09

## 2021-11-15 RX ORDER — GLIMEPIRIDE 2 MG/1
TABLET ORAL
Qty: 90 TABLET | Refills: 1 | Status: SHIPPED | OUTPATIENT
Start: 2021-11-15 | End: 2022-02-04

## 2021-12-08 DIAGNOSIS — I15.9 SECONDARY HYPERTENSION: ICD-10-CM

## 2021-12-08 DIAGNOSIS — E11.65 TYPE 2 DIABETES MELLITUS WITH HYPERGLYCEMIA, WITHOUT LONG-TERM CURRENT USE OF INSULIN (HCC): ICD-10-CM

## 2021-12-08 DIAGNOSIS — E78.5 HYPERLIPIDEMIA, UNSPECIFIED HYPERLIPIDEMIA TYPE: ICD-10-CM

## 2021-12-10 LAB
ALBUMIN SERPL-MCNC: 4.3 G/DL (ref 3.7–4.7)
ALBUMIN/GLOB SERPL: 2.2 {RATIO} (ref 1.2–2.2)
ALP SERPL-CCNC: 98 IU/L (ref 44–121)
ALT SERPL-CCNC: 15 IU/L (ref 0–44)
AST SERPL-CCNC: 18 IU/L (ref 0–40)
BASOPHILS # BLD AUTO: 0 X10E3/UL (ref 0–0.2)
BASOPHILS NFR BLD AUTO: 0 %
BILIRUB SERPL-MCNC: 0.9 MG/DL (ref 0–1.2)
BUN SERPL-MCNC: 17 MG/DL (ref 8–27)
BUN/CREAT SERPL: 16 (ref 10–24)
CALCIUM SERPL-MCNC: 8.8 MG/DL (ref 8.6–10.2)
CHLORIDE SERPL-SCNC: 102 MMOL/L (ref 96–106)
CHOLEST SERPL-MCNC: 92 MG/DL (ref 100–199)
CO2 SERPL-SCNC: 24 MMOL/L (ref 20–29)
CREAT SERPL-MCNC: 1.06 MG/DL (ref 0.76–1.27)
EOSINOPHIL # BLD AUTO: 0.2 X10E3/UL (ref 0–0.4)
EOSINOPHIL NFR BLD AUTO: 4 %
ERYTHROCYTE [DISTWIDTH] IN BLOOD BY AUTOMATED COUNT: 12.7 % (ref 11.6–15.4)
GLOBULIN SER CALC-MCNC: 2 G/DL (ref 1.5–4.5)
GLUCOSE SERPL-MCNC: 176 MG/DL (ref 65–99)
HBA1C MFR BLD: 9.3 % (ref 4.8–5.6)
HCT VFR BLD AUTO: 38.6 % (ref 37.5–51)
HDLC SERPL-MCNC: 35 MG/DL
HGB BLD-MCNC: 13 G/DL (ref 13–17.7)
IMM GRANULOCYTES # BLD AUTO: 0 X10E3/UL (ref 0–0.1)
IMM GRANULOCYTES NFR BLD AUTO: 1 %
LDLC SERPL CALC-MCNC: 41 MG/DL (ref 0–99)
LDLC/HDLC SERPL: 1.2 RATIO (ref 0–3.6)
LYMPHOCYTES # BLD AUTO: 0.9 X10E3/UL (ref 0.7–3.1)
LYMPHOCYTES NFR BLD AUTO: 16 %
MCH RBC QN AUTO: 31.3 PG (ref 26.6–33)
MCHC RBC AUTO-ENTMCNC: 33.7 G/DL (ref 31.5–35.7)
MCV RBC AUTO: 93 FL (ref 79–97)
MONOCYTES # BLD AUTO: 0.4 X10E3/UL (ref 0.1–0.9)
MONOCYTES NFR BLD AUTO: 7 %
NEUTROPHILS # BLD AUTO: 4.2 X10E3/UL (ref 1.4–7)
NEUTROPHILS NFR BLD AUTO: 72 %
PLATELET # BLD AUTO: 129 X10E3/UL (ref 150–450)
POTASSIUM SERPL-SCNC: 4.4 MMOL/L (ref 3.5–5.2)
PROT SERPL-MCNC: 6.3 G/DL (ref 6–8.5)
RBC # BLD AUTO: 4.15 X10E6/UL (ref 4.14–5.8)
SODIUM SERPL-SCNC: 138 MMOL/L (ref 134–144)
TRIGL SERPL-MCNC: 75 MG/DL (ref 0–149)
TSH SERPL DL<=0.005 MIU/L-ACNC: 1.53 UIU/ML (ref 0.45–4.5)
VLDLC SERPL CALC-MCNC: 16 MG/DL (ref 5–40)
WBC # BLD AUTO: 5.9 X10E3/UL (ref 3.4–10.8)

## 2021-12-16 ENCOUNTER — OFFICE VISIT (OUTPATIENT)
Dept: INTERNAL MEDICINE | Facility: CLINIC | Age: 81
End: 2021-12-16

## 2021-12-16 VITALS
DIASTOLIC BLOOD PRESSURE: 76 MMHG | TEMPERATURE: 97.1 F | SYSTOLIC BLOOD PRESSURE: 130 MMHG | BODY MASS INDEX: 23.65 KG/M2 | HEIGHT: 70 IN | HEART RATE: 70 BPM | OXYGEN SATURATION: 99 % | WEIGHT: 165.2 LBS

## 2021-12-16 DIAGNOSIS — I15.9 SECONDARY HYPERTENSION: ICD-10-CM

## 2021-12-16 DIAGNOSIS — Z95.1 S/P CABG X 3: Primary | ICD-10-CM

## 2021-12-16 DIAGNOSIS — E78.5 HYPERLIPIDEMIA, UNSPECIFIED HYPERLIPIDEMIA TYPE: ICD-10-CM

## 2021-12-16 DIAGNOSIS — E11.65 TYPE 2 DIABETES MELLITUS WITH HYPERGLYCEMIA, WITHOUT LONG-TERM CURRENT USE OF INSULIN (HCC): ICD-10-CM

## 2021-12-16 DIAGNOSIS — I48.0 PAROXYSMAL ATRIAL FIBRILLATION (HCC): ICD-10-CM

## 2021-12-16 PROBLEM — Z95.5 S/P DRUG ELUTING CORONARY STENT PLACEMENT: Status: ACTIVE | Noted: 2021-08-17

## 2021-12-16 PROCEDURE — 99214 OFFICE O/P EST MOD 30 MIN: CPT | Performed by: INTERNAL MEDICINE

## 2021-12-16 NOTE — PROGRESS NOTES
Cece Teixeira is a 81 y.o. male here today to f/u on DM 2 and hyperlipidemia.      History of Present Illness   He is on 3 meds for his sugars and is having cont elevated sugars  He says it went up in sept and has stayed up  He has not been exercising much  He is still walking  He had been doing very well with amaril but now going up.    The following portions of the patient's history were reviewed and updated as appropriate: allergies, current medications, past medical history, past social history and problem list.  He has been eating more sugar  Review of Systems    Objective   Physical Exam  Vitals reviewed.   Constitutional:       Appearance: He is well-developed.   HENT:      Head: Normocephalic and atraumatic.      Right Ear: External ear normal.      Left Ear: External ear normal.   Eyes:      Conjunctiva/sclera: Conjunctivae normal.      Pupils: Pupils are equal, round, and reactive to light.   Neck:      Thyroid: No thyromegaly.      Trachea: No tracheal deviation.   Cardiovascular:      Rate and Rhythm: Normal rate and regular rhythm.      Heart sounds: Normal heart sounds.   Pulmonary:      Effort: Pulmonary effort is normal.      Breath sounds: Normal breath sounds.   Abdominal:      General: Bowel sounds are normal. There is no distension.      Palpations: Abdomen is soft.      Tenderness: There is no abdominal tenderness.   Musculoskeletal:         General: No deformity. Normal range of motion.      Cervical back: Normal range of motion.   Skin:     General: Skin is warm and dry.   Neurological:      Mental Status: He is alert and oriented to person, place, and time.   Psychiatric:         Behavior: Behavior normal.         Thought Content: Thought content normal.         Judgment: Judgment normal.         Vitals:    12/16/21 1028   BP: 130/76   Pulse: 70   Temp: 97.1 °F (36.2 °C)   SpO2: 99%     Body mass index is 23.7 kg/m².       Orders Only on 12/08/2021   Component Date Value Ref  Range Status   • WBC 12/09/2021 5.9  3.4 - 10.8 x10E3/uL Final   • RBC 12/09/2021 4.15  4.14 - 5.80 x10E6/uL Final   • Hemoglobin 12/09/2021 13.0  13.0 - 17.7 g/dL Final   • Hematocrit 12/09/2021 38.6  37.5 - 51.0 % Final   • MCV 12/09/2021 93  79 - 97 fL Final   • MCH 12/09/2021 31.3  26.6 - 33.0 pg Final   • MCHC 12/09/2021 33.7  31.5 - 35.7 g/dL Final   • RDW 12/09/2021 12.7  11.6 - 15.4 % Final   • Platelets 12/09/2021 129* 150 - 450 x10E3/uL Final   • Neutrophil Rel % 12/09/2021 72  Not Estab. % Final   • Lymphocyte Rel % 12/09/2021 16  Not Estab. % Final   • Monocyte Rel % 12/09/2021 7  Not Estab. % Final   • Eosinophil Rel % 12/09/2021 4  Not Estab. % Final   • Basophil Rel % 12/09/2021 0  Not Estab. % Final   • Neutrophils Absolute 12/09/2021 4.2  1.4 - 7.0 x10E3/uL Final   • Lymphocytes Absolute 12/09/2021 0.9  0.7 - 3.1 x10E3/uL Final   • Monocytes Absolute 12/09/2021 0.4  0.1 - 0.9 x10E3/uL Final   • Eosinophils Absolute 12/09/2021 0.2  0.0 - 0.4 x10E3/uL Final   • Basophils Absolute 12/09/2021 0.0  0.0 - 0.2 x10E3/uL Final   • Immature Granulocyte Rel % 12/09/2021 1  Not Estab. % Final   • Immature Grans Absolute 12/09/2021 0.0  0.0 - 0.1 x10E3/uL Final   • Glucose 12/09/2021 176* 65 - 99 mg/dL Final   • BUN 12/09/2021 17  8 - 27 mg/dL Final   • Creatinine 12/09/2021 1.06  0.76 - 1.27 mg/dL Final   • eGFR Non  Am 12/09/2021 66  >59 mL/min/1.73 Final   • eGFR African Am 12/09/2021 76  >59 mL/min/1.73 Final    Comment: **In accordance with recommendations from the NKF-ASN Task force,**    [facility] eGFR calculation to the    2021 CKD-EPI creatinine equation that estimates kidney function    without a race variable.     • BUN/Creatinine Ratio 12/09/2021 16  10 - 24 Final   • Sodium 12/09/2021 138  134 - 144 mmol/L Final   • Potassium 12/09/2021 4.4  3.5 - 5.2 mmol/L Final   • Chloride 12/09/2021 102  96 - 106 mmol/L Final   • Total CO2 12/09/2021 24  20 - 29 mmol/L  Final   • Calcium 12/09/2021 8.8  8.6 - 10.2 mg/dL Final   • Total Protein 12/09/2021 6.3  6.0 - 8.5 g/dL Final   • Albumin 12/09/2021 4.3  3.7 - 4.7 g/dL Final   • Globulin 12/09/2021 2.0  1.5 - 4.5 g/dL Final   • A/G Ratio 12/09/2021 2.2  1.2 - 2.2 Final   • Total Bilirubin 12/09/2021 0.9  0.0 - 1.2 mg/dL Final   • Alkaline Phosphatase 12/09/2021 98  44 - 121 IU/L Final                  **Please note reference interval change**   • AST (SGOT) 12/09/2021 18  0 - 40 IU/L Final   • ALT (SGPT) 12/09/2021 15  0 - 44 IU/L Final   • Total Cholesterol 12/09/2021 92* 100 - 199 mg/dL Final   • Triglycerides 12/09/2021 75  0 - 149 mg/dL Final   • HDL Cholesterol 12/09/2021 35* >39 mg/dL Final   • VLDL Cholesterol Giovanny 12/09/2021 16  5 - 40 mg/dL Final   • LDL Chol Calc (NIH) 12/09/2021 41  0 - 99 mg/dL Final   • LDL/HDL RATIO 12/09/2021 1.2  0.0 - 3.6 ratio Final    Comment:                                     LDL/HDL Ratio                                              Men  Women                                1/2 Avg.Risk  1.0    1.5                                    Avg.Risk  3.6    3.2                                 2X Avg.Risk  6.2    5.0                                 3X Avg.Risk  8.0    6.1     • TSH 12/09/2021 1.530  0.450 - 4.500 uIU/mL Final   • Hemoglobin A1C 12/09/2021 9.3* 4.8 - 5.6 % Final    Comment:          Prediabetes: 5.7 - 6.4           Diabetes: >6.4           Glycemic control for adults with diabetes: <7.0         Current Outpatient Medications:   •  Acetaminophen (TYLENOL ARTHRITIS PAIN PO), Take 1 tablet by mouth 2 (Two) Times a Day., Disp: , Rfl:   •  atorvastatin (LIPITOR) 80 MG tablet, TAKE 1 TABLET BY MOUTH EVERY DAY AT NIGHT, Disp: 90 tablet, Rfl: 1  •  Blood Glucose Monitoring Suppl (ONE TOUCH ULTRA 2) w/Device kit, 1 each by Other route See Admin Instructions., Disp: 1 each, Rfl: 0  •  carvedilol (COREG) 12.5 MG tablet, TAKE 1 TABLET BY MOUTH TWICE A DAY WITH MEALS, Disp: 180 tablet, Rfl: 1  •   clopidogrel (PLAVIX) 75 MG tablet, Take 75 mg by mouth Daily., Disp: , Rfl:   •  glimepiride (AMARYL) 2 MG tablet, TAKE 1 TABLET BY MOUTH EVERY DAY BEFORE BREAKFAST, Disp: 90 tablet, Rfl: 1  •  glucose blood (OneTouch Ultra) test strip, USE ONE STRIP DAILY FOR DIABETES E11.9, Disp: 100 each, Rfl: 3  •  metFORMIN ER (GLUCOPHAGE-XR) 500 MG 24 hr tablet, TAKE 2 TABLETS BY MOUTH TWICE A DAY, Disp: 360 tablet, Rfl: 1  •  Multiple Vitamins-Minerals (PRESERVISION AREDS) capsule, Take 1 tablet by mouth 2 (Two) Times a Day., Disp: , Rfl:   •  PHARMACIST CHOICE LANCETS misc, 1 each 2 (Two) Times a Day., Disp: 200 each, Rfl: 3  •  rivaroxaban (XARELTO) 20 MG tablet, Take 20 mg by mouth Daily., Disp: , Rfl:   •  Tradjenta 5 MG tablet tablet, TAKE 1 TABLET BY MOUTH EVERY DAY, Disp: 90 tablet, Rfl: 1      Assessment/Plan   Diagnoses and all orders for this visit:    1. S/P CABG x 3 (Primary)    2. Paroxysmal atrial fibrillation (HCC)    3. Secondary hypertension    4. Hyperlipidemia, unspecified hyperlipidemia type    5. Type 2 diabetes mellitus with hyperglycemia, without long-term current use of insulin (HCC)    1.  DM-  Poorly controlled  He does not want insulin  -  We will d/c the trejenta and try jardiance 10 then 25 and fu via video   He will watch his sugar   2.  HTN- ok with current meds  3. CAD- no CP no sob

## 2021-12-28 RX ORDER — CARVEDILOL 12.5 MG/1
TABLET ORAL
Qty: 180 TABLET | Refills: 1 | Status: SHIPPED | OUTPATIENT
Start: 2021-12-28 | End: 2022-07-29

## 2021-12-28 RX ORDER — ATORVASTATIN CALCIUM 80 MG/1
TABLET, FILM COATED ORAL
Qty: 90 TABLET | Refills: 1 | Status: SHIPPED | OUTPATIENT
Start: 2021-12-28 | End: 2022-08-11

## 2022-01-27 ENCOUNTER — TELEMEDICINE (OUTPATIENT)
Dept: INTERNAL MEDICINE | Facility: CLINIC | Age: 82
End: 2022-01-27

## 2022-01-27 DIAGNOSIS — E11.65 TYPE 2 DIABETES MELLITUS WITH HYPERGLYCEMIA, WITHOUT LONG-TERM CURRENT USE OF INSULIN: Primary | ICD-10-CM

## 2022-01-27 PROCEDURE — 99213 OFFICE O/P EST LOW 20 MIN: CPT | Performed by: INTERNAL MEDICINE

## 2022-01-27 NOTE — PROGRESS NOTES
Cece Teixeira is a 81 y.o. male.     History of Present Illness   His sugars have been better with the jardiance he has been taking the Tradjenta as well as wonder if he needs to continue this.  His average sugar is running about 120    The following portions of the patient's history were reviewed and updated as appropriate: allergies, current medications, past medical history, past social history and problem list.  He has been trying to limit his carbohydrate intake  Review of Systems    Objective   Physical Exam  Vitals reviewed.   Constitutional:       Appearance: He is well-developed.   HENT:      Head: Normocephalic and atraumatic.      Right Ear: External ear normal.      Left Ear: External ear normal.   Eyes:      Conjunctiva/sclera: Conjunctivae normal.      Pupils: Pupils are equal, round, and reactive to light.   Neck:      Thyroid: No thyromegaly.      Trachea: No tracheal deviation.   Cardiovascular:      Rate and Rhythm: Normal rate and regular rhythm.      Heart sounds: Normal heart sounds.   Pulmonary:      Effort: Pulmonary effort is normal.      Breath sounds: Normal breath sounds.   Abdominal:      General: Bowel sounds are normal. There is no distension.      Palpations: Abdomen is soft.      Tenderness: There is no abdominal tenderness.   Musculoskeletal:         General: No deformity. Normal range of motion.      Cervical back: Normal range of motion.   Skin:     General: Skin is warm and dry.   Neurological:      Mental Status: He is alert and oriented to person, place, and time.   Psychiatric:         Behavior: Behavior normal.         Thought Content: Thought content normal.         Judgment: Judgment normal.         There were no vitals filed for this visit.  There is no height or weight on file to calculate BMI.         Assessment/Plan   Diagnoses and all orders for this visit:    1. Type 2 diabetes mellitus with hyperglycemia, without long-term current use of insulin (HCC)  (Primary)  -     Hemoglobin A1c; Future  -     CBC & Differential; Future  -     Basic Metabolic Panel; Future    Other orders  -     empagliflozin (Jardiance) 25 MG tablet tablet; Take 1 tablet by mouth Daily.  Dispense: 30 tablet; Refill: 3      1. DM-patient sugars are clearly better.  I am not sure how much benefit he is getting from the Tradjenta as the Jardiance is doing pretty well.  We are going to attempt to get this covered through insurance he has coronary artery disease so this is the better medicine for him to be on and it appears to be working better.  Continue to work on diet and exercise

## 2022-01-28 ENCOUNTER — TELEPHONE (OUTPATIENT)
Dept: INTERNAL MEDICINE | Facility: CLINIC | Age: 82
End: 2022-01-28

## 2022-01-28 NOTE — TELEPHONE ENCOUNTER
PATIENT STATES:THAT empagliflozin (Jardiance) 25 MG tablet tablet  COST TO MUCH AND WOULD LIKE TO CHANGE IT PLEASE ADVISE          PATIENT CAN BE REACHED ON: [phone]    [id number] [place] [place] - [address] AT [facility] - [phone]  - [phone]   [phone]

## 2022-02-03 ENCOUNTER — TELEPHONE (OUTPATIENT)
Dept: INTERNAL MEDICINE | Facility: CLINIC | Age: 82
End: 2022-02-03

## 2022-02-03 NOTE — TELEPHONE ENCOUNTER
PATIENT IS NOT ABLE TO AFFORD THE empagliflozin (Jardiance) 25 MG tablet tablet HE SPOKE WITH [id number] - [place], [place] - [address] AT [facility] - [phone]  - [phone]     WHO TOLD HIM TO CALL HIS DR FOR A SCRIPT OF A MEDICATION THAT IS AFFORDABLE. PATIENT IS OUT OF THIS MEDICATION.  PLEASE ADVISE: [phone]

## 2022-02-04 RX ORDER — GLIMEPIRIDE 4 MG/1
4 TABLET ORAL
Qty: 90 TABLET | Refills: 1 | Status: SHIPPED | OUTPATIENT
Start: 2022-02-04 | End: 2022-03-22 | Stop reason: SDUPTHER

## 2022-03-22 ENCOUNTER — OFFICE VISIT (OUTPATIENT)
Dept: INTERNAL MEDICINE | Facility: CLINIC | Age: 82
End: 2022-03-22

## 2022-03-22 VITALS
OXYGEN SATURATION: 98 % | BODY MASS INDEX: 23.28 KG/M2 | HEART RATE: 74 BPM | TEMPERATURE: 97.1 F | DIASTOLIC BLOOD PRESSURE: 76 MMHG | WEIGHT: 162.6 LBS | HEIGHT: 70 IN | SYSTOLIC BLOOD PRESSURE: 140 MMHG

## 2022-03-22 DIAGNOSIS — E11.65 TYPE 2 DIABETES MELLITUS WITH HYPERGLYCEMIA, WITHOUT LONG-TERM CURRENT USE OF INSULIN: Primary | ICD-10-CM

## 2022-03-22 PROCEDURE — 99214 OFFICE O/P EST MOD 30 MIN: CPT | Performed by: INTERNAL MEDICINE

## 2022-03-22 RX ORDER — GLIMEPIRIDE 4 MG/1
8 TABLET ORAL
Qty: 180 TABLET | Refills: 1 | Status: SHIPPED | OUTPATIENT
Start: 2022-03-22 | End: 2022-08-24

## 2022-03-22 NOTE — PROGRESS NOTES
Cece Teixeira is a 81 y.o. male here today to f/u on DM 2.      History of Present Illness   Pt has been compliant with diabetes meds. No side effects from medication No episodes of hypoglycemia. Patient denies any polyuria or polydipsia    The following portions of the patient's history were reviewed and updated as appropriate: allergies, current medications, past medical history, past social history and problem list.  He has been watching carb intrake carefully  He has not been walking    Review of Systems    Objective   Physical Exam  Vitals reviewed.   Constitutional:       Appearance: He is well-developed.   HENT:      Head: Normocephalic and atraumatic.      Right Ear: External ear normal.      Left Ear: External ear normal.   Eyes:      Conjunctiva/sclera: Conjunctivae normal.      Pupils: Pupils are equal, round, and reactive to light.   Neck:      Thyroid: No thyromegaly.      Trachea: No tracheal deviation.   Cardiovascular:      Rate and Rhythm: Normal rate and regular rhythm.      Heart sounds: Normal heart sounds.   Pulmonary:      Effort: Pulmonary effort is normal.      Breath sounds: Normal breath sounds.   Abdominal:      General: Bowel sounds are normal. There is no distension.      Palpations: Abdomen is soft.      Tenderness: There is no abdominal tenderness.   Musculoskeletal:         General: No deformity. Normal range of motion.      Cervical back: Normal range of motion.   Skin:     General: Skin is warm and dry.   Neurological:      Mental Status: He is alert and oriented to person, place, and time.   Psychiatric:         Behavior: Behavior normal.         Thought Content: Thought content normal.         Judgment: Judgment normal.         Vitals:    03/22/22 1452   BP: 140/76   Pulse: 74   Temp: 97.1 °F (36.2 °C)   SpO2: 98%     Body mass index is 23.33 kg/m².    Results Encounter on 03/10/2022   Component Date Value Ref Range Status   • Hemoglobin A1C 03/18/2022 8.7 (A) 4.8 -  5.6 % Final    Comment:          Prediabetes: 5.7 - 6.4           Diabetes: >6.4           Glycemic control for adults with diabetes: <7.0     • WBC 03/18/2022 4.4  3.4 - 10.8 x10E3/uL Final   • RBC 03/18/2022 4.45  4.14 - 5.80 x10E6/uL Final   • Hemoglobin 03/18/2022 13.4  13.0 - 17.7 g/dL Final   • Hematocrit 03/18/2022 42.1  37.5 - 51.0 % Final   • MCV 03/18/2022 95  79 - 97 fL Final   • MCH 03/18/2022 30.1  26.6 - 33.0 pg Final   • MCHC 03/18/2022 31.8  31.5 - 35.7 g/dL Final   • RDW 03/18/2022 13.0  11.6 - 15.4 % Final   • Platelets 03/18/2022 131 (A) 150 - 450 x10E3/uL Final   • Neutrophil Rel % 03/18/2022 74  Not Estab. % Final   • Lymphocyte Rel % 03/18/2022 16  Not Estab. % Final   • Monocyte Rel % 03/18/2022 7  Not Estab. % Final   • Eosinophil Rel % 03/18/2022 2  Not Estab. % Final   • Basophil Rel % 03/18/2022 1  Not Estab. % Final   • Neutrophils Absolute 03/18/2022 3.3  1.4 - 7.0 x10E3/uL Final   • Lymphocytes Absolute 03/18/2022 0.7  0.7 - 3.1 x10E3/uL Final   • Monocytes Absolute 03/18/2022 0.3  0.1 - 0.9 x10E3/uL Final   • Eosinophils Absolute 03/18/2022 0.1  0.0 - 0.4 x10E3/uL Final   • Basophils Absolute 03/18/2022 0.0  0.0 - 0.2 x10E3/uL Final   • Immature Granulocyte Rel % 03/18/2022 0  Not Estab. % Final   • Immature Grans Absolute 03/18/2022 0.0  0.0 - 0.1 x10E3/uL Final   • Glucose 03/18/2022 155 (A) 65 - 99 mg/dL Final   • BUN 03/18/2022 13  8 - 27 mg/dL Final   • Creatinine 03/18/2022 0.99  0.76 - 1.27 mg/dL Final   • EGFR Result 03/18/2022 77  >59 mL/min/1.73 Final   • BUN/Creatinine Ratio 03/18/2022 13  10 - 24 Final   • Sodium 03/18/2022 143  134 - 144 mmol/L Final   • Potassium 03/18/2022 4.7  3.5 - 5.2 mmol/L Final   • Chloride 03/18/2022 103  96 - 106 mmol/L Final   • Total CO2 03/18/2022 24  20 - 29 mmol/L Final   • Calcium 03/18/2022 9.4  8.6 - 10.2 mg/dL Final          Current Outpatient Medications   Medication Instructions   • Acetaminophen (TYLENOL ARTHRITIS PAIN PO) 1 tablet,  Oral, 2 Times Daily   • atorvastatin (LIPITOR) 80 MG tablet TAKE 1 TABLET BY MOUTH EVERY DAY AT NIGHT   • Blood Glucose Monitoring Suppl (ONE TOUCH ULTRA 2) w/Device kit 1 each, Other, See Admin Instructions   • carvedilol (COREG) 12.5 MG tablet TAKE 1 TABLET BY MOUTH TWICE A DAY WITH MEALS   • clopidogrel (PLAVIX) 75 mg, Oral, Daily   • glimepiride (AMARYL) 8 mg, Oral, Every Morning Before Breakfast   • glucose blood (OneTouch Ultra) test strip USE ONE STRIP DAILY FOR DIABETES E11.9   • metFORMIN ER (GLUCOPHAGE-XR) 500 MG 24 hr tablet TAKE 2 TABLETS BY MOUTH TWICE A DAY   • Multiple Vitamins-Minerals (PRESERVISION AREDS) capsule 1 tablet, Oral, 2 Times Daily   • PHARMACIST CHOICE LANCETS misc 1 each, Does not apply, 2 Times Daily   • rivaroxaban (XARELTO) 20 mg, Oral, Daily   • Tradjenta 5 MG tablet tablet TAKE 1 TABLET BY MOUTH EVERY DAY         Assessment/Plan   Diagnoses and all orders for this visit:    1. Type 2 diabetes mellitus with hyperglycemia, without long-term current use of insulin (HCC) (Primary)    Other orders  -     glimepiride (Amaryl) 4 MG tablet; Take 2 tablets by mouth Every Morning Before Breakfast.  Dispense: 180 tablet; Refill: 1        1.  DM- he has not been walking reg he will resume the we will increase the amaryl to 8mg and cont tradjenta if insurance cover

## 2022-03-25 RX ORDER — BLOOD SUGAR DIAGNOSTIC
STRIP MISCELLANEOUS
Qty: 100 EACH | Refills: 3 | Status: SHIPPED | OUTPATIENT
Start: 2022-03-25 | End: 2022-06-28 | Stop reason: SDUPTHER

## 2022-03-25 RX ORDER — METFORMIN HYDROCHLORIDE 500 MG/1
TABLET, EXTENDED RELEASE ORAL
Qty: 360 TABLET | Refills: 1 | Status: SHIPPED | OUTPATIENT
Start: 2022-03-25 | End: 2022-10-13

## 2022-04-15 ENCOUNTER — OFFICE VISIT (OUTPATIENT)
Dept: INTERNAL MEDICINE | Facility: CLINIC | Age: 82
End: 2022-04-15

## 2022-04-15 VITALS
SYSTOLIC BLOOD PRESSURE: 134 MMHG | BODY MASS INDEX: 22.68 KG/M2 | HEART RATE: 76 BPM | TEMPERATURE: 97.1 F | WEIGHT: 158.4 LBS | DIASTOLIC BLOOD PRESSURE: 76 MMHG | HEIGHT: 70 IN | OXYGEN SATURATION: 99 %

## 2022-04-15 DIAGNOSIS — E11.65 TYPE 2 DIABETES MELLITUS WITH HYPERGLYCEMIA, WITHOUT LONG-TERM CURRENT USE OF INSULIN: Primary | ICD-10-CM

## 2022-04-15 PROCEDURE — 99213 OFFICE O/P EST LOW 20 MIN: CPT | Performed by: INTERNAL MEDICINE

## 2022-04-15 NOTE — PROGRESS NOTES
Cece Teixeira is a 81 y.o. male here today for increased blood sugar.      History of Present Illness   He says his sugars have been high in the am but he has been taking 6mg of amaryl a day    The following portions of the patient's history were reviewed and updated as appropriate: allergies, current medications, past medical history, past social history and problem list.  He has been watching carb in take  He is starting to walk a little more    Review of Systems    Objective   Physical Exam  Vitals reviewed.   Constitutional:       Appearance: He is well-developed.   HENT:      Head: Normocephalic and atraumatic.      Right Ear: External ear normal.      Left Ear: External ear normal.   Eyes:      Conjunctiva/sclera: Conjunctivae normal.      Pupils: Pupils are equal, round, and reactive to light.   Neck:      Thyroid: No thyromegaly.      Trachea: No tracheal deviation.   Cardiovascular:      Rate and Rhythm: Normal rate and regular rhythm.      Heart sounds: Normal heart sounds.   Pulmonary:      Effort: Pulmonary effort is normal.      Breath sounds: Normal breath sounds.   Abdominal:      General: Bowel sounds are normal. There is no distension.      Palpations: Abdomen is soft.      Tenderness: There is no abdominal tenderness.   Musculoskeletal:         General: No deformity. Normal range of motion.      Cervical back: Normal range of motion.   Skin:     General: Skin is warm and dry.   Neurological:      Mental Status: He is alert and oriented to person, place, and time.   Psychiatric:         Behavior: Behavior normal.         Thought Content: Thought content normal.         Judgment: Judgment normal.         Vitals:    04/15/22 1130   BP: 134/76   Pulse: 76   Temp: 97.1 °F (36.2 °C)   SpO2: 99%     Body mass index is 22.73 kg/m².       Current Outpatient Medications   Medication Instructions   • Acetaminophen (TYLENOL ARTHRITIS PAIN PO) 1 tablet, Oral, 2 Times Daily   • atorvastatin  (LIPITOR) 80 MG tablet TAKE 1 TABLET BY MOUTH EVERY DAY AT NIGHT   • Blood Glucose Monitoring Suppl (ONE TOUCH ULTRA 2) w/Device kit 1 each, Other, See Admin Instructions   • carvedilol (COREG) 12.5 MG tablet TAKE 1 TABLET BY MOUTH TWICE A DAY WITH MEALS   • clopidogrel (PLAVIX) 75 mg, Oral, Daily   • glimepiride (AMARYL) 8 mg, Oral, Every Morning Before Breakfast   • metFORMIN ER (GLUCOPHAGE-XR) 500 MG 24 hr tablet TAKE 2 TABLETS BY MOUTH TWICE A DAY   • Multiple Vitamins-Minerals (PRESERVISION AREDS) capsule 1 tablet, Oral, 2 Times Daily   • OneTouch Ultra test strip USE ONE STRIP DAILY FOR DIABETES E11.9   • PHARMACIST CHOICE LANCETS misc 1 each, Does not apply, 2 Times Daily   • rivaroxaban (XARELTO) 20 mg, Oral, Daily   • Tradjenta 5 MG tablet tablet TAKE 1 TABLET BY MOUTH EVERY DAY         Assessment/Plan   Diagnoses and all orders for this visit:    1. Type 2 diabetes mellitus with hyperglycemia, without long-term current use of insulin (HCC) (Primary)      1.  DM-  He needs to take 8mg a day  He will adjust this and cont to wokr on diet and exercise

## 2022-05-09 RX ORDER — GLIMEPIRIDE 2 MG/1
TABLET ORAL
Qty: 90 TABLET | Refills: 1 | OUTPATIENT
Start: 2022-05-09

## 2022-05-09 RX ORDER — LINAGLIPTIN 5 MG/1
TABLET, FILM COATED ORAL
Qty: 90 TABLET | Refills: 1 | Status: SHIPPED | OUTPATIENT
Start: 2022-05-09 | End: 2022-06-28

## 2022-06-28 ENCOUNTER — OFFICE VISIT (OUTPATIENT)
Dept: INTERNAL MEDICINE | Facility: CLINIC | Age: 82
End: 2022-06-28

## 2022-06-28 ENCOUNTER — TELEPHONE (OUTPATIENT)
Dept: INTERNAL MEDICINE | Facility: CLINIC | Age: 82
End: 2022-06-28

## 2022-06-28 VITALS
OXYGEN SATURATION: 97 % | BODY MASS INDEX: 22.85 KG/M2 | DIASTOLIC BLOOD PRESSURE: 86 MMHG | WEIGHT: 159.6 LBS | TEMPERATURE: 98 F | SYSTOLIC BLOOD PRESSURE: 134 MMHG | HEART RATE: 68 BPM | HEIGHT: 70 IN

## 2022-06-28 DIAGNOSIS — I48.0 PAROXYSMAL ATRIAL FIBRILLATION: Primary | ICD-10-CM

## 2022-06-28 DIAGNOSIS — E11.65 TYPE 2 DIABETES MELLITUS WITH HYPERGLYCEMIA, WITHOUT LONG-TERM CURRENT USE OF INSULIN: ICD-10-CM

## 2022-06-28 PROCEDURE — 99214 OFFICE O/P EST MOD 30 MIN: CPT | Performed by: INTERNAL MEDICINE

## 2022-06-28 RX ORDER — LANCING DEVICE
1 EACH MISCELLANEOUS 3 TIMES DAILY
Qty: 300 EACH | Refills: 3 | Status: SHIPPED | OUTPATIENT
Start: 2022-06-28 | End: 2022-12-08

## 2022-06-28 RX ORDER — ORAL SEMAGLUTIDE 3 MG/1
3 TABLET ORAL DAILY
Qty: 30 TABLET | Refills: 0 | COMMUNITY
Start: 2022-06-28 | End: 2022-08-09

## 2022-06-28 NOTE — PROGRESS NOTES
Cece Teixeira is a 81 y.o. male here today to f/u on DM 2, hyperlipidemia and a fib.      History of Present Illness   He says his sugars are getting higher  Lowest 90 elevated after eating  He has not been eating much sugar or carbs  No desserts  No polyuria no polydipsia.  He has not been sick  The following portions of the patient's history were reviewed and updated as appropriate: allergies, current medications, past medical history, past social history and problem list.  He does not really think he has changed his diet and he is exercising every day  Review of Systems    Objective   Physical Exam  Vitals reviewed.   Constitutional:       Appearance: He is well-developed.   HENT:      Head: Normocephalic and atraumatic.      Right Ear: External ear normal.      Left Ear: External ear normal.   Eyes:      Conjunctiva/sclera: Conjunctivae normal.      Pupils: Pupils are equal, round, and reactive to light.   Neck:      Thyroid: No thyromegaly.      Trachea: No tracheal deviation.   Cardiovascular:      Rate and Rhythm: Normal rate and regular rhythm.      Heart sounds: Normal heart sounds.   Pulmonary:      Effort: Pulmonary effort is normal.      Breath sounds: Normal breath sounds.   Abdominal:      General: Bowel sounds are normal. There is no distension.      Palpations: Abdomen is soft.      Tenderness: There is no abdominal tenderness.   Musculoskeletal:         General: No deformity. Normal range of motion.      Cervical back: Normal range of motion.   Skin:     General: Skin is warm and dry.   Neurological:      Mental Status: He is alert and oriented to person, place, and time.   Psychiatric:         Behavior: Behavior normal.         Thought Content: Thought content normal.         Judgment: Judgment normal.         Vitals:    06/28/22 1102   BP: 134/86   Pulse: 68   Temp: 98 °F (36.7 °C)   SpO2: 97%     Body mass index is 22.9 kg/m².       Results Encounter on 06/22/2022   Component Date  Value Ref Range Status   • Glucose 06/21/2022 207 (A) 65 - 99 mg/dL Final   • BUN 06/21/2022 21  8 - 27 mg/dL Final   • Creatinine 06/21/2022 1.04  0.76 - 1.27 mg/dL Final   • EGFR Result 06/21/2022 73  >59 mL/min/1.73 Final   • BUN/Creatinine Ratio 06/21/2022 20  10 - 24 Final   • Sodium 06/21/2022 140  134 - 144 mmol/L Final   • Potassium 06/21/2022 4.6  3.5 - 5.2 mmol/L Final   • Chloride 06/21/2022 102  96 - 106 mmol/L Final   • Total CO2 06/21/2022 27  20 - 29 mmol/L Final   • Calcium 06/21/2022 9.4  8.6 - 10.2 mg/dL Final   • Total Protein 06/21/2022 6.5  6.0 - 8.5 g/dL Final   • Albumin 06/21/2022 4.5  3.7 - 4.7 g/dL Final   • Globulin 06/21/2022 2.0  1.5 - 4.5 g/dL Final   • A/G Ratio 06/21/2022 2.3 (A) 1.2 - 2.2 Final   • Total Bilirubin 06/21/2022 1.1  0.0 - 1.2 mg/dL Final   • Alkaline Phosphatase 06/21/2022 90  44 - 121 IU/L Final   • AST (SGOT) 06/21/2022 18  0 - 40 IU/L Final   • ALT (SGPT) 06/21/2022 16  0 - 44 IU/L Final   • Hemoglobin A1C 06/21/2022 9.9 (A) 4.8 - 5.6 % Final    Comment:          Prediabetes: 5.7 - 6.4           Diabetes: >6.4           Glycemic control for adults with diabetes: <7.0     • Total Cholesterol 06/21/2022 99 (A) 100 - 199 mg/dL Final   • Triglycerides 06/21/2022 61  0 - 149 mg/dL Final   • HDL Cholesterol 06/21/2022 39 (A) >39 mg/dL Final   • VLDL Cholesterol Giovanny 06/21/2022 14  5 - 40 mg/dL Final   • LDL Chol Calc (NIH) 06/21/2022 46  0 - 99 mg/dL Final   • LDL/HDL RATIO 06/21/2022 1.2  0.0 - 3.6 ratio Final    Comment:                                     LDL/HDL Ratio                                              Men  Women                                1/2 Avg.Risk  1.0    1.5                                    Avg.Risk  3.6    3.2                                 2X Avg.Risk  6.2    5.0                                 3X Avg.Risk  8.0    6.1     • TSH 06/21/2022 1.950  0.450 - 4.500 uIU/mL Final       Current Outpatient Medications:   •  Acetaminophen (TYLENOL  ARTHRITIS PAIN PO), Take 1 tablet by mouth 2 (Two) Times a Day., Disp: , Rfl:   •  atorvastatin (LIPITOR) 80 MG tablet, TAKE 1 TABLET BY MOUTH EVERY DAY AT NIGHT, Disp: 90 tablet, Rfl: 1  •  Blood Glucose Monitoring Suppl (ONE TOUCH ULTRA 2) w/Device kit, 1 each by Other route See Admin Instructions., Disp: 1 each, Rfl: 0  •  carvedilol (COREG) 12.5 MG tablet, TAKE 1 TABLET BY MOUTH TWICE A DAY WITH MEALS, Disp: 180 tablet, Rfl: 1  •  clopidogrel (PLAVIX) 75 MG tablet, Take 75 mg by mouth Daily., Disp: , Rfl:   •  glimepiride (Amaryl) 4 MG tablet, Take 2 tablets by mouth Every Morning Before Breakfast., Disp: 180 tablet, Rfl: 1  •  metFORMIN ER (GLUCOPHAGE-XR) 500 MG 24 hr tablet, TAKE 2 TABLETS BY MOUTH TWICE A DAY, Disp: 360 tablet, Rfl: 1  •  Multiple Vitamins-Minerals (PRESERVISION AREDS) capsule, Take 1 tablet by mouth 2 (Two) Times a Day., Disp: , Rfl:   •  OneTouch Ultra test strip, USE ONE STRIP DAILY FOR DIABETES E11.9, Disp: 100 each, Rfl: 3  •  PHARMACIST CHOICE LANCETS misc, 1 each 2 (Two) Times a Day., Disp: 200 each, Rfl: 3  •  rivaroxaban (XARELTO) 20 MG tablet, Take 20 mg by mouth Daily., Disp: , Rfl:   •  Semaglutide (Rybelsus) 3 MG tablet, Take 1 tablet by mouth Daily., Disp: 30 tablet, Rfl: 0      Assessment & Plan   Diagnoses and all orders for this visit:    1. Paroxysmal atrial fibrillation (HCC) (Primary)    2. Type 2 diabetes mellitus with hyperglycemia, without long-term current use of insulin (HCC)    Other orders  -     Semaglutide (Rybelsus) 3 MG tablet; Take 1 tablet by mouth Daily.  Dispense: 30 tablet; Refill: 0    1.  DM-  He does not want insulin   Sugars have been 92- 300 in the am  He only checks in the am  He is to now check 2-3x a day  Discussed diet again and will rechek 6 weeks  If still high may have to do insulin

## 2022-06-28 NOTE — TELEPHONE ENCOUNTER
RX SENT TO PHARMACY      ----- Message from Carmela Sweet MD sent at 6/28/2022 11:30 AM EDT -----  Needs sugar checked atleast twice a day  write for 3x time

## 2022-07-29 RX ORDER — CARVEDILOL 12.5 MG/1
TABLET ORAL
Qty: 180 TABLET | Refills: 1 | Status: SHIPPED | OUTPATIENT
Start: 2022-07-29 | End: 2023-01-26

## 2022-08-03 ENCOUNTER — TELEPHONE (OUTPATIENT)
Dept: INTERNAL MEDICINE | Facility: CLINIC | Age: 82
End: 2022-08-03

## 2022-08-03 NOTE — TELEPHONE ENCOUNTER
Caller: Urban Teixeira    Relationship to patient: Self    Best call back number: [phone]     Patient is needing: PATIENT HAS A BLOOD SUGAR FOLLOW UP ON 08/09/22.  HE IS WANTING TO KNOW IF HE NEEDS TO GET BLOOD WORK BEFORE THAT APPOINTMENT.  HE IS REQUESTING A RETURN CALL TO ADVISE.    PLEASE ADVISE.

## 2022-08-09 ENCOUNTER — OFFICE VISIT (OUTPATIENT)
Dept: INTERNAL MEDICINE | Facility: CLINIC | Age: 82
End: 2022-08-09

## 2022-08-09 VITALS
TEMPERATURE: 97.3 F | SYSTOLIC BLOOD PRESSURE: 158 MMHG | DIASTOLIC BLOOD PRESSURE: 90 MMHG | BODY MASS INDEX: 22.58 KG/M2 | OXYGEN SATURATION: 97 % | HEIGHT: 70 IN | WEIGHT: 157.7 LBS | HEART RATE: 79 BPM

## 2022-08-09 DIAGNOSIS — I15.9 SECONDARY HYPERTENSION: ICD-10-CM

## 2022-08-09 DIAGNOSIS — E11.9 TYPE 2 DIABETES MELLITUS WITHOUT COMPLICATION, WITHOUT LONG-TERM CURRENT USE OF INSULIN: Primary | ICD-10-CM

## 2022-08-09 DIAGNOSIS — E11.65 TYPE 2 DIABETES MELLITUS WITH HYPERGLYCEMIA, WITHOUT LONG-TERM CURRENT USE OF INSULIN: ICD-10-CM

## 2022-08-09 PROCEDURE — 99214 OFFICE O/P EST MOD 30 MIN: CPT | Performed by: INTERNAL MEDICINE

## 2022-08-09 RX ORDER — ORAL SEMAGLUTIDE 7 MG/1
7 TABLET ORAL DAILY
Qty: 30 TABLET | Refills: 2 | Status: SHIPPED | OUTPATIENT
Start: 2022-08-09 | End: 2022-11-08

## 2022-08-09 RX ORDER — LOSARTAN POTASSIUM 50 MG/1
50 TABLET ORAL
Qty: 30 TABLET | Refills: 3 | Status: SHIPPED | OUTPATIENT
Start: 2022-08-09 | End: 2022-11-07

## 2022-08-09 NOTE — PROGRESS NOTES
Cece Teixeira is a 81 y.o. male.   Fu on DM    History of Present Illness   Am sugars around 120  afteernoon mid 200    The following portions of the patient's history were reviewed and updated as appropriate: allergies, current medications, past medical history, past social history and problem list.    Review of Systems    Objective   Physical Exam  Vitals reviewed.   Constitutional:       Appearance: He is well-developed.   HENT:      Head: Normocephalic and atraumatic.      Right Ear: External ear normal.      Left Ear: External ear normal.   Eyes:      Conjunctiva/sclera: Conjunctivae normal.      Pupils: Pupils are equal, round, and reactive to light.   Neck:      Thyroid: No thyromegaly.      Trachea: No tracheal deviation.   Cardiovascular:      Rate and Rhythm: Normal rate and regular rhythm.      Heart sounds: Normal heart sounds.   Pulmonary:      Effort: Pulmonary effort is normal.      Breath sounds: Normal breath sounds.   Abdominal:      General: Bowel sounds are normal. There is no distension.      Palpations: Abdomen is soft.      Tenderness: There is no abdominal tenderness.   Musculoskeletal:         General: No deformity. Normal range of motion.      Cervical back: Normal range of motion.   Skin:     General: Skin is warm and dry.   Neurological:      Mental Status: He is alert and oriented to person, place, and time.   Psychiatric:         Behavior: Behavior normal.         Thought Content: Thought content normal.         Judgment: Judgment normal.         Vitals:    08/09/22 1047   BP: 158/90   Pulse: 79   Temp: 97.3 °F (36.3 °C)   SpO2: 97%     Body mass index is 22.63 kg/m².         Assessment & Plan   Diagnoses and all orders for this visit:    1. Type 2 diabetes mellitus without complication, without long-term current use of insulin (HCC) (Primary)  -     Hemoglobin A1c; Future  -     Basic Metabolic Panel; Future    2. Secondary hypertension    3. Type 2 diabetes mellitus with  hyperglycemia, without long-term current use of insulin (HCC)    Other orders  -     Semaglutide (Rybelsus) 7 MG tablet; Take 7 mg by mouth Daily.  Dispense: 30 tablet; Refill: 2  -     losartan (Cozaar) 50 MG tablet; Take 1 tablet by mouth every night at bedtime.  Dispense: 30 tablet; Refill: 3          1.  DM- sugars are better with the rybelsus increase to 7mg.  He will continue to work on diet and exercise.  2.  HTN- add losartan to evening meds  50mg a day and recheck   He ritter limit salt  3. Afib-  He has an episode yesterday   He is going to follow with crds    Addendum for the : the above the automated diagnoses  are in conflict but completely irrelevant to patient care.  His sugar is high but is improving.  Assessment and plan is unchanged as the automated diagnoses have no impact on patient care     Answers for HPI/ROS submitted by the patient on 8/8/2022  What is the primary reason for your visit?: Diabetes

## 2022-08-10 ENCOUNTER — TELEPHONE (OUTPATIENT)
Dept: INTERNAL MEDICINE | Facility: CLINIC | Age: 82
End: 2022-08-10

## 2022-08-10 NOTE — TELEPHONE ENCOUNTER
Caller: Urban Teixeira    Relationship to patient: Self    Best call back number: [phone]    Patient is needing: PT STATED THAT PCP PRESCRIBED Semaglutide (Rybelsus) 7 MG tablet AT APPT YESTERDAY AND HE WAS TOLD TO CALL BACK IF IT WAS ABOVE A CERTAIN AMOUNT. PT STATED IT IS $240 FOR A 30 DAY SUPPLY.

## 2022-08-11 RX ORDER — ATORVASTATIN CALCIUM 80 MG/1
TABLET, FILM COATED ORAL
Qty: 90 TABLET | Refills: 1 | Status: SHIPPED | OUTPATIENT
Start: 2022-08-11 | End: 2023-01-27

## 2022-08-15 ENCOUNTER — TELEPHONE (OUTPATIENT)
Dept: INTERNAL MEDICINE | Facility: CLINIC | Age: 82
End: 2022-08-15

## 2022-08-15 NOTE — TELEPHONE ENCOUNTER
Caller: Urban Teixeira    Relationship: Self    Best call back number: [phone]    What medication are you requesting: Semaglutide (Rybelsus) 7 MG tablet     If a prescription is needed, what is your preferred pharmacy and phone number: [facility]/PHARMACY #[id number] - [place], [place] - [address] AT [facility] - [phone]  - [phone]      Additional notes:  PATIENT STATES THAT HE IS CALLING BACK TO ADVISE THAT THIS MEDICATION IS OVER $250.00 FOR A 30 DAY SUPPLY?  IS THERE ANYTHING ELSE THAT IS SIMILAR?

## 2022-08-24 RX ORDER — GLIMEPIRIDE 4 MG/1
TABLET ORAL
Qty: 90 TABLET | Refills: 1 | Status: SHIPPED | OUTPATIENT
Start: 2022-08-24 | End: 2022-11-08

## 2022-08-31 ENCOUNTER — OFFICE VISIT (OUTPATIENT)
Dept: INTERNAL MEDICINE | Facility: CLINIC | Age: 82
End: 2022-08-31

## 2022-08-31 ENCOUNTER — TRANSCRIBE ORDERS (OUTPATIENT)
Dept: ADMINISTRATIVE | Facility: HOSPITAL | Age: 82
End: 2022-08-31

## 2022-08-31 VITALS
HEIGHT: 70 IN | HEART RATE: 81 BPM | TEMPERATURE: 97.7 F | WEIGHT: 157.3 LBS | SYSTOLIC BLOOD PRESSURE: 124 MMHG | OXYGEN SATURATION: 98 % | DIASTOLIC BLOOD PRESSURE: 72 MMHG | BODY MASS INDEX: 22.52 KG/M2

## 2022-08-31 DIAGNOSIS — U07.1 CLINICAL DIAGNOSIS OF SEVERE ACUTE RESPIRATORY SYNDROME CORONAVIRUS 2 (SARS-COV-2) DISEASE: Primary | ICD-10-CM

## 2022-08-31 DIAGNOSIS — U07.1 COVID-19 VIRUS INFECTION: Primary | ICD-10-CM

## 2022-08-31 LAB
EXPIRATION DATE: ABNORMAL
EXPIRATION DATE: NORMAL
FLUAV AG NPH QL: NEGATIVE
FLUBV AG NPH QL: NEGATIVE
INTERNAL CONTROL: ABNORMAL
INTERNAL CONTROL: NORMAL
Lab: ABNORMAL
Lab: NORMAL
SARS-COV-2 AG UPPER RESP QL IA.RAPID: DETECTED

## 2022-08-31 PROCEDURE — 87804 INFLUENZA ASSAY W/OPTIC: CPT | Performed by: INTERNAL MEDICINE

## 2022-08-31 PROCEDURE — 87426 SARSCOV CORONAVIRUS AG IA: CPT | Performed by: INTERNAL MEDICINE

## 2022-08-31 PROCEDURE — 99213 OFFICE O/P EST LOW 20 MIN: CPT | Performed by: INTERNAL MEDICINE

## 2022-08-31 RX ORDER — METHYLPREDNISOLONE SODIUM SUCCINATE 125 MG/2ML
125 INJECTION, POWDER, LYOPHILIZED, FOR SOLUTION INTRAMUSCULAR; INTRAVENOUS AS NEEDED
Status: CANCELLED | OUTPATIENT
Start: 2022-09-01

## 2022-08-31 RX ORDER — EPINEPHRINE 0.1 MG/ML
0.3 SYRINGE (ML) INJECTION AS NEEDED
Status: CANCELLED | OUTPATIENT
Start: 2022-09-01

## 2022-08-31 RX ORDER — BEBTELOVIMAB 87.5 MG/ML
175 INJECTION, SOLUTION INTRAVENOUS ONCE
Status: CANCELLED | OUTPATIENT
Start: 2022-09-01

## 2022-08-31 RX ORDER — SODIUM CHLORIDE 9 MG/ML
30 INJECTION, SOLUTION INTRAVENOUS ONCE
Status: CANCELLED | OUTPATIENT
Start: 2022-09-01 | End: 2022-09-01

## 2022-08-31 RX ORDER — DIPHENHYDRAMINE HCL 50 MG
50 CAPSULE ORAL ONCE AS NEEDED
Status: CANCELLED | OUTPATIENT
Start: 2022-09-01

## 2022-08-31 RX ORDER — LINAGLIPTIN 5 MG/1
5 TABLET, FILM COATED ORAL DAILY
COMMUNITY
Start: 2022-08-15 | End: 2022-11-08

## 2022-08-31 RX ORDER — DIPHENHYDRAMINE HYDROCHLORIDE 50 MG/ML
50 INJECTION INTRAMUSCULAR; INTRAVENOUS ONCE AS NEEDED
Status: CANCELLED | OUTPATIENT
Start: 2022-09-01

## 2022-08-31 NOTE — PROGRESS NOTES
Cece Teixeira is a 81 y.o. male.     History of Present Illness   Started with myalgias and aching yesterday with some HA  And eye and throat drainage  No fevers.  One episode of emesis  Mild dry cpugh  Patient has not had COVID.  He has had COVID vaccines and booster  The following portions of the patient's history were reviewed and updated as appropriate: allergies, current medications, past medical history, past social history and problem list.  No ill contacts    Review of Systems    Objective   Physical Exam  Vitals reviewed.   Constitutional:       Appearance: He is well-developed.   HENT:      Head: Normocephalic and atraumatic.      Right Ear: External ear normal.      Left Ear: External ear normal.   Eyes:      Conjunctiva/sclera: Conjunctivae normal.      Pupils: Pupils are equal, round, and reactive to light.   Neck:      Thyroid: No thyromegaly.      Trachea: No tracheal deviation.   Cardiovascular:      Rate and Rhythm: Normal rate and regular rhythm.      Heart sounds: Normal heart sounds.   Pulmonary:      Effort: Pulmonary effort is normal.      Breath sounds: Normal breath sounds.   Abdominal:      General: Bowel sounds are normal. There is no distension.      Palpations: Abdomen is soft.      Tenderness: There is no abdominal tenderness.   Musculoskeletal:         General: No deformity. Normal range of motion.      Cervical back: Normal range of motion.   Skin:     General: Skin is warm and dry.   Neurological:      Mental Status: He is alert and oriented to person, place, and time.   Psychiatric:         Behavior: Behavior normal.         Thought Content: Thought content normal.         Judgment: Judgment normal.         Vitals:    08/31/22 1137   BP: 124/72   Pulse: 81   Temp: 97.7 °F (36.5 °C)   SpO2: 98%     Body mass index is 22.57 kg/m².         Assessment & Plan   Diagnoses and all orders for this visit:    1. COVID-19 virus infection (Primary)      1.  COVID infection: Patient  has multiple risk factors for progression to severe disease we will go ahead and get monoclonal antibodies ordered.  Clearly he has multiple medications that are contraindicated to be used with Paxlovid.  Most notably is the Xarelto.

## 2022-09-01 ENCOUNTER — HOSPITAL ENCOUNTER (OUTPATIENT)
Dept: INFUSION THERAPY | Facility: HOSPITAL | Age: 82
Discharge: HOME | End: 2022-09-01
Admitting: INTERNAL MEDICINE

## 2022-09-01 VITALS
RESPIRATION RATE: 20 BRPM | TEMPERATURE: 97.1 F | DIASTOLIC BLOOD PRESSURE: 75 MMHG | SYSTOLIC BLOOD PRESSURE: 138 MMHG | HEART RATE: 83 BPM | OXYGEN SATURATION: 100 %

## 2022-09-01 DIAGNOSIS — U07.1 CLINICAL DIAGNOSIS OF COVID-19: Primary | ICD-10-CM

## 2022-09-01 PROCEDURE — 25010000002 INJECTION, BEBTELOVIMAB, 175 MG: Performed by: INTERNAL MEDICINE

## 2022-09-01 PROCEDURE — M0222 HC INJECTION BEBTELOVIMAB: HCPCS | Performed by: INTERNAL MEDICINE

## 2022-09-01 PROCEDURE — 96374 THER/PROPH/DIAG INJ IV PUSH: CPT

## 2022-09-01 RX ORDER — METHYLPREDNISOLONE SODIUM SUCCINATE 125 MG/2ML
125 INJECTION, POWDER, LYOPHILIZED, FOR SOLUTION INTRAMUSCULAR; INTRAVENOUS AS NEEDED
Status: DISCONTINUED | OUTPATIENT
Start: 2022-09-01 | End: 2022-09-03 | Stop reason: HOSPADM

## 2022-09-01 RX ORDER — SODIUM CHLORIDE 9 MG/ML
30 INJECTION, SOLUTION INTRAVENOUS ONCE
Status: DISCONTINUED | OUTPATIENT
Start: 2022-09-01 | End: 2022-09-03 | Stop reason: HOSPADM

## 2022-09-01 RX ORDER — DIPHENHYDRAMINE HYDROCHLORIDE 50 MG/ML
50 INJECTION INTRAMUSCULAR; INTRAVENOUS ONCE AS NEEDED
Status: DISCONTINUED | OUTPATIENT
Start: 2022-09-01 | End: 2022-09-03 | Stop reason: HOSPADM

## 2022-09-01 RX ORDER — DIPHENHYDRAMINE HCL 25 MG
50 CAPSULE ORAL ONCE AS NEEDED
Status: DISCONTINUED | OUTPATIENT
Start: 2022-09-01 | End: 2022-09-03 | Stop reason: HOSPADM

## 2022-09-01 RX ORDER — DIPHENHYDRAMINE HCL 25 MG
50 CAPSULE ORAL ONCE AS NEEDED
OUTPATIENT
Start: 2022-09-01

## 2022-09-01 RX ORDER — DIPHENHYDRAMINE HYDROCHLORIDE 50 MG/ML
50 INJECTION INTRAMUSCULAR; INTRAVENOUS ONCE AS NEEDED
OUTPATIENT
Start: 2022-09-01

## 2022-09-01 RX ORDER — BEBTELOVIMAB 87.5 MG/ML
175 INJECTION, SOLUTION INTRAVENOUS ONCE
Status: CANCELLED | OUTPATIENT
Start: 2022-09-01

## 2022-09-01 RX ORDER — SODIUM CHLORIDE 9 MG/ML
30 INJECTION, SOLUTION INTRAVENOUS ONCE
Status: CANCELLED | OUTPATIENT
Start: 2022-09-01 | End: 2022-09-01

## 2022-09-01 RX ORDER — BEBTELOVIMAB 87.5 MG/ML
175 INJECTION, SOLUTION INTRAVENOUS ONCE
Status: COMPLETED | OUTPATIENT
Start: 2022-09-01 | End: 2022-09-01

## 2022-09-01 RX ORDER — METHYLPREDNISOLONE SODIUM SUCCINATE 125 MG/2ML
125 INJECTION, POWDER, LYOPHILIZED, FOR SOLUTION INTRAMUSCULAR; INTRAVENOUS AS NEEDED
OUTPATIENT
Start: 2022-09-01

## 2022-09-01 RX ADMIN — BEBTELOVIMAB 175 MG: 87.5 INJECTION, SOLUTION INTRAVENOUS at 09:15

## 2022-09-02 ENCOUNTER — TELEPHONE (OUTPATIENT)
Dept: INTERNAL MEDICINE | Facility: CLINIC | Age: 82
End: 2022-09-02

## 2022-09-06 ENCOUNTER — TELEPHONE (OUTPATIENT)
Dept: INTERNAL MEDICINE | Facility: CLINIC | Age: 82
End: 2022-09-06

## 2022-09-06 NOTE — TELEPHONE ENCOUNTER
PATIENT CALLED AND STATES HE IS COVID POSITIVE. HE RECEIVED AN INFUSION ON 8/31.  HE IS FEELING MUCH BETTER. HE WANTS TO KNOW IF HE NEEDS TO DO ANYTHING ELSE.    PLEASE CALL [phone]

## 2022-09-06 NOTE — TELEPHONE ENCOUNTER
Pt is feeling better. He was advised that there is nothing else he needs to do. If that changes to call the office.

## 2022-09-20 ENCOUNTER — OFFICE VISIT (OUTPATIENT)
Dept: INTERNAL MEDICINE | Facility: CLINIC | Age: 82
End: 2022-09-20

## 2022-09-20 VITALS
HEART RATE: 79 BPM | WEIGHT: 157.8 LBS | TEMPERATURE: 97.3 F | BODY MASS INDEX: 22.59 KG/M2 | HEIGHT: 70 IN | OXYGEN SATURATION: 99 % | SYSTOLIC BLOOD PRESSURE: 120 MMHG | DIASTOLIC BLOOD PRESSURE: 60 MMHG

## 2022-09-20 DIAGNOSIS — E11.65 TYPE 2 DIABETES MELLITUS WITH HYPERGLYCEMIA, WITHOUT LONG-TERM CURRENT USE OF INSULIN: Primary | ICD-10-CM

## 2022-09-20 PROCEDURE — 99214 OFFICE O/P EST MOD 30 MIN: CPT | Performed by: INTERNAL MEDICINE

## 2022-09-20 RX ORDER — INSULIN DEGLUDEC 200 U/ML
12 INJECTION, SOLUTION SUBCUTANEOUS DAILY
Qty: 3 ML | Refills: 3 | COMMUNITY
Start: 2022-09-20 | End: 2022-09-27 | Stop reason: SDUPTHER

## 2022-09-20 RX ORDER — INSULIN GLARGINE 300 U/ML
12 INJECTION, SOLUTION SUBCUTANEOUS DAILY
Qty: 1.5 ML | Refills: 5 | Status: CANCELLED | OUTPATIENT
Start: 2022-09-20

## 2022-09-20 RX ORDER — INSULIN DETEMIR 100 [IU]/ML
10 INJECTION, SOLUTION SUBCUTANEOUS DAILY
Refills: 12 | Status: CANCELLED | OUTPATIENT
Start: 2022-09-20

## 2022-09-23 ENCOUNTER — TELEPHONE (OUTPATIENT)
Dept: INTERNAL MEDICINE | Facility: CLINIC | Age: 82
End: 2022-09-23

## 2022-09-23 NOTE — TELEPHONE ENCOUNTER
Caller: Urban Teixeira    Relationship: Self    Best call back number: [phone]    PATIENT WAS ADVISED IF HE COULD NOT GET [employer] TO WORK THEN THE NURSE WOULD GIVE HIM AN EMAIL TO SEND READINGS OF BLOOD GLUCOSE. HE IS HAVING TROUBLE FIGURING OUT HOW TO SEND THROUGH [employer]. PLEASE CALL PATIENT      AFTER SHOT ON 2/22/2022  BLOOD GLUCOSE

## 2022-09-27 RX ORDER — INSULIN DEGLUDEC 200 U/ML
15 INJECTION, SOLUTION SUBCUTANEOUS DAILY
Qty: 3 ML | Refills: 3 | Status: SHIPPED | OUTPATIENT
Start: 2022-09-27 | End: 2022-11-08 | Stop reason: SDUPTHER

## 2022-09-27 NOTE — TELEPHONE ENCOUNTER
Caller: Urban Teixeira    Relationship: Self    Best call back number: [phone]  Requested Prescriptions:   Requested Prescriptions     Pending Prescriptions Disp Refills   • Insulin Degludec (Tresiba FlexTouch) 200 UNIT/ML solution pen-injector pen injection 3 mL 3     Sig: Inject 12 Units under the skin into the appropriate area as directed Daily.        Pharmacy where request should be sent: [facility]/PHARMACY #[id number] - [place], [place] - [address] AT [facility] - [phone]  - [phone] FX     Additional details provided by patient: PATIENT STATED THAT HE FEELS THAT THERE NEEDS TO BE AN ADJUSTMENT ON THE STRENGTH. PATIENT STATED HIS BLOOD SUGAR IN THE EVENING HAS BEEN AROUND 225-250. PATIENT STATED THAT IT MAY NEED TO BE STRONGER. PATIENT STATED HIS FASTING LEVELS HAVE BEEN AROUND 160-170. PATIENT STATED THAT HE HAS ABOUT TWO TO THREE DAYS LEFT OF HIS INSULIN. PLEASE ADVISE.     Does the patient have less than a 3 day supply:  [x] Yes  [] No    Carmen Morrell   09/27/22 09:03 EDT

## 2022-10-13 RX ORDER — METFORMIN HYDROCHLORIDE 500 MG/1
TABLET, EXTENDED RELEASE ORAL
Qty: 360 TABLET | Refills: 1 | Status: SHIPPED | OUTPATIENT
Start: 2022-10-13 | End: 2022-11-08

## 2022-10-18 ENCOUNTER — TELEPHONE (OUTPATIENT)
Dept: INTERNAL MEDICINE | Facility: CLINIC | Age: 82
End: 2022-10-18

## 2022-10-18 DIAGNOSIS — E11.65 TYPE 2 DIABETES MELLITUS WITH HYPERGLYCEMIA, WITHOUT LONG-TERM CURRENT USE OF INSULIN: Primary | ICD-10-CM

## 2022-10-18 NOTE — TELEPHONE ENCOUNTER
Caller: Puneet Urban R    Relationship: Self    Best call back number: [phone]    What medication are you requesting: NEEDLES FOR INSULIN TESTING    Have you had these symptoms before:    [x] Yes  [] No    Have you been treated for these symptoms before:   [x] Yes  [] No    If a prescription is needed, what is your preferred pharmacy and phone number: [facility]/PHARMACY #[id number] - [place], [place] - [address] AT [facility] - [phone]  - [phone] FX     Additional notes:  PATIENT ONLY 6 NEEDLES LEFT AND WOULD LIKE A PRESCRIPTION FOR NEEDLES SENT TO PHARMACY.  ALSO, HE WANTED DR. MENDEZ TO KNOW HIS BLOOD SUGAR READINGS LISTED BELOW.  HE SAYS HE WANTS TO TALK TO DR. MENDEZ ABOUT HIS BLOOD SUGAR LEVEL.    BLOOD SUGAR READINGS IN THE AFTERNOON AROUND 3-4 PM    10/01/22     327  10/2/22        385  10/03/22 302  10/04/22 386  10/5/22 340  10/07/22 344  10/09/22 468  10/10/22 225  10/11/22 258  10/13/22 337  10/15/22 329  10/16/22 307  10/17/22 486    PLEASE CALL PATIENT TO DISCUSS AND ADVISE.

## 2022-11-07 ENCOUNTER — TELEPHONE (OUTPATIENT)
Dept: INTERNAL MEDICINE | Facility: CLINIC | Age: 82
End: 2022-11-07

## 2022-11-07 RX ORDER — LOSARTAN POTASSIUM 50 MG/1
TABLET ORAL
Qty: 90 TABLET | Refills: 1 | Status: SHIPPED | OUTPATIENT
Start: 2022-11-07

## 2022-11-07 NOTE — TELEPHONE ENCOUNTER
Patient's blood sugars are running high. Over 250 to 400 the past few days. Says he may be confused on which medications he needs to be taking. And dosages.  Please call patient at: [phone]

## 2022-11-07 NOTE — TELEPHONE ENCOUNTER
Pt aware to increase tredsiba to 25 units daily. He is scheduled tomorrow to follow up with endo.

## 2022-11-08 ENCOUNTER — PATIENT ROUNDING (BHMG ONLY) (OUTPATIENT)
Dept: ENDOCRINOLOGY | Age: 82
End: 2022-11-08

## 2022-11-08 ENCOUNTER — OFFICE VISIT (OUTPATIENT)
Dept: ENDOCRINOLOGY | Age: 82
End: 2022-11-08

## 2022-11-08 VITALS
BODY MASS INDEX: 21.9 KG/M2 | WEIGHT: 153 LBS | SYSTOLIC BLOOD PRESSURE: 118 MMHG | DIASTOLIC BLOOD PRESSURE: 84 MMHG | HEART RATE: 86 BPM | OXYGEN SATURATION: 96 % | HEIGHT: 70 IN

## 2022-11-08 DIAGNOSIS — E78.49 OTHER HYPERLIPIDEMIA: ICD-10-CM

## 2022-11-08 DIAGNOSIS — E55.9 VITAMIN D DEFICIENCY: ICD-10-CM

## 2022-11-08 DIAGNOSIS — E53.8 VITAMIN B 12 DEFICIENCY: ICD-10-CM

## 2022-11-08 DIAGNOSIS — I10 ESSENTIAL HYPERTENSION: ICD-10-CM

## 2022-11-08 DIAGNOSIS — E11.69 TYPE 2 DIABETES MELLITUS WITH OTHER SPECIFIED COMPLICATION, UNSPECIFIED WHETHER LONG TERM INSULIN USE: Primary | ICD-10-CM

## 2022-11-08 LAB
EXPIRATION DATE: ABNORMAL
GLUCOSE BLDC GLUCOMTR-MCNC: 385 MG/DL (ref 70–130)
HBA1C MFR BLD: 385 %
Lab: ABNORMAL

## 2022-11-08 PROCEDURE — 99204 OFFICE O/P NEW MOD 45 MIN: CPT | Performed by: INTERNAL MEDICINE

## 2022-11-08 PROCEDURE — 82962 GLUCOSE BLOOD TEST: CPT | Performed by: INTERNAL MEDICINE

## 2022-11-08 PROCEDURE — 83036 HEMOGLOBIN GLYCOSYLATED A1C: CPT | Performed by: INTERNAL MEDICINE

## 2022-11-08 RX ORDER — PROCHLORPERAZINE 25 MG/1
1 SUPPOSITORY RECTAL ONCE
Qty: 1 EACH | Refills: 1 | Status: SHIPPED | OUTPATIENT
Start: 2022-11-08 | End: 2022-11-08

## 2022-11-08 RX ORDER — PROCHLORPERAZINE 25 MG/1
1 SUPPOSITORY RECTAL
Qty: 1 EACH | Refills: 4 | Status: SHIPPED | OUTPATIENT
Start: 2022-11-08 | End: 2022-12-08

## 2022-11-08 RX ORDER — PROCHLORPERAZINE 25 MG/1
SUPPOSITORY RECTAL
Qty: 9 EACH | Refills: 4 | Status: SHIPPED | OUTPATIENT
Start: 2022-11-08 | End: 2022-11-16 | Stop reason: SDUPTHER

## 2022-11-08 RX ORDER — INSULIN ASPART 100 [IU]/ML
INJECTION, SOLUTION INTRAVENOUS; SUBCUTANEOUS
Qty: 45 ML | Refills: 3 | Status: SHIPPED | OUTPATIENT
Start: 2022-11-08

## 2022-11-08 RX ORDER — METFORMIN HYDROCHLORIDE 500 MG/1
TABLET, EXTENDED RELEASE ORAL
Qty: 360 TABLET | Refills: 3 | Status: SHIPPED | OUTPATIENT
Start: 2022-11-08

## 2022-11-08 RX ORDER — INSULIN DEGLUDEC 200 U/ML
INJECTION, SOLUTION SUBCUTANEOUS
Qty: 45 ML | Refills: 3 | Status: SHIPPED | OUTPATIENT
Start: 2022-11-08

## 2022-11-08 NOTE — PROGRESS NOTES
Chief Complaint  Diabetes (Type 2 )    Cece Teixeira presents to [facility] ENDOCRINOLOGY  History of Present Illness    81-year-old male has been referred by Dr Sweet for evaluation and treatment recommendations for type 2 diabetes.      The diabetes was first diagnosed 25 years ago in/ around late 1990s.  He was prediabetic for some years prior.    He was started on OHA 20 yeara go.  Since 2 years has been advised MDI, but he has refused to be on insulin, until recently.   He has been on Tresiba since one month.       Patient is experiencing polyuria, polydipsia and nocturia.   Patient has poor vision in the right eye. He has macular degeneration.   Patient is experiencing unexplained weight loss ie 5-6 lb over 2 weeks.   Patient denies numbness/tingling in feet.  Patient denies chest pain/shortness of breath.  Patient denies nausea/vomiting or change in bowel habits.     Diet: Patient has two meals a day and no snacks. He does not restrict his carbs.  Exercise: Patient is active, and walks 3 miles a day.   Medication regimen:   Tresiba 25 units daily. Dose increased from 12 gradually to 25 units.  . Atorvastatin 80 mg daily  . Cozaar 50 mg daily    Recently discontinued medications:  Tradjenta 5 mg daily,-> finishing, due to high cost.  Rybelsus 7 mg daily,-> no longer takes this.  Metformin 500 mg daily-> he ran out of the medication and has not resumed this.  Amaryl 4 mg daily,-> no longer takes this since starting insulin therapy.    - Compliance: good     Patient checks glucose 2 times per day  Reviewed blood sugar log.    FBS approx. 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   - Cerebrovascular disease: Denies  -  "Peripheral arterial disease: Denies     Microvascular complications:  - Diabetic retinopathy: Denies  Last comprehensive diabetic eye exam: Dr Herbert Galvez/Dr Serrano for 20 years. Two months ago  - Diabetic nephropathy: Denies  - Diabetic neuropathy: Numbness in feet  Diabetic foot exam: 11/08/2022  - Dental: not up to date     Tobacco use: Denies     Obstructive sleep apnea:  He does not know. He has some snoring, but no pauses in breathing.     Vaccination:  - Influenza, 2022  - Covid, x 4  - Pneumococcal polysaccharide vaccine- done, over due for repeat  - HBV vaccine , not done      Latest Reference Range & Units 06/21/22 07:51 11/08/22 10:12   Glucose 70 - 130 mg/dL 207 (H) 385 !   Sodium 134 - 144 mmol/L 140    Potassium 3.5 - 5.2 mmol/L 4.6    CO2 20 - 29 mmol/L 27    Chloride 96 - 106 mmol/L 102    Creatinine 0.76 - 1.27 mg/dL 1.04    BUN 8 - 27 mg/dL 21    BUN/Creatinine Ratio 10 - 24  20    Calcium 8.6 - 10.2 mg/dL 9.4    EGFR Result >59 mL/min/1.73 73    Alkaline Phosphatase 44 - 121 IU/L 90    Total Protein 6.0 - 8.5 g/dL 6.5    ALT (SGPT) 0 - 44 IU/L 16    AST (SGOT) 0 - 40 IU/L 18    Total Bilirubin 0.0 - 1.2 mg/dL 1.1    Albumin 3.7 - 4.7 g/dL 4.5    A/G Ratio 1.2 - 2.2  2.3 (H)    Hemoglobin A1C % 9.9 (H)    TSH Baseline 0.450 - 4.500 uIU/mL 1.950    Total Cholesterol 100 - 199 mg/dL 99 (L)    HDL Cholesterol >39 mg/dL 39 (L)    LDL Cholesterol  0 - 99 mg/dL 46    Triglycerides 0 - 149 mg/dL 61    LDL/HDL Ratio 0.0 - 3.6 ratio 1.2    VLDL Cholesterol Giovanny 5 - 40 mg/dL 14        Reviewed PMH, Allergies, Medication list, FH and SH  Brother has T2DM    Review of Systems as per HPI, otherwise negative    Objective   Vital Signs:   /84 (BP Location: Left arm)   Pulse 86   Ht 177.8 cm (70\")   Wt 69.4 kg (153 lb)   SpO2 96%   BMI 21.95 kg/m²     Physical Exam  Vitals and nursing note reviewed.   Constitutional:       General: He is not in acute distress.     Appearance: He is not ill-appearing, " toxic-appearing or diaphoretic.   HENT:      Head: Normocephalic and atraumatic.      Nose: Nose normal.      Mouth/Throat:      Mouth: Mucous membranes are moist.      Pharynx: Oropharynx is clear. No oropharyngeal exudate or posterior oropharyngeal erythema.   Eyes:      General: No scleral icterus.     Extraocular Movements: Extraocular movements intact.      Conjunctiva/sclera: Conjunctivae normal.      Pupils: Pupils are equal, round, and reactive to light.   Neck:      Thyroid: No thyroid mass, thyromegaly or thyroid tenderness.      Vascular: No carotid bruit.      Trachea: Trachea normal.   Cardiovascular:      Rate and Rhythm: Normal rate and regular rhythm.      Pulses: Normal pulses.      Heart sounds: Normal heart sounds. No murmur heard.    No friction rub. No gallop.   Pulmonary:      Effort: Pulmonary effort is normal. No respiratory distress.      Breath sounds: Normal breath sounds. No stridor. No wheezing, rhonchi or rales.   Chest:      Chest wall: No tenderness.   Abdominal:      General: Bowel sounds are normal. There is no distension.      Palpations: Abdomen is soft. There is no mass.      Tenderness: There is no abdominal tenderness. There is no rebound.   Musculoskeletal:         General: No swelling, tenderness, deformity or signs of injury. Normal range of motion.      Cervical back: Normal range of motion and neck supple. No rigidity or tenderness.      Right lower leg: No edema.      Left lower leg: No edema.   Feet:      Comments: Right ankle brace  Lymphadenopathy:      Cervical: No cervical adenopathy.   Skin:     General: Skin is warm and dry.      Capillary Refill: Capillary refill takes 2 to 3 seconds.      Coloration: Skin is not jaundiced or pale.      Findings: No bruising, erythema, lesion or rash.   Neurological:      General: No focal deficit present.      Mental Status: He is alert and oriented to person, place, and time. Mental status is at baseline.      Cranial Nerves: No  cranial nerve deficit.      Sensory: No sensory deficit.      Motor: No weakness.      Coordination: Coordination normal.      Gait: Gait normal.      Deep Tendon Reflexes: Reflexes normal.   Psychiatric:         Mood and Affect: Mood normal.         Behavior: Behavior normal.         Thought Content: Thought content normal.         Judgment: Judgment normal.      Diabetic foot exam:   Left: Filament test present   Pulses Dorsalis Pedis:  present   Reflexes 2+    Vibratory sensation absent   Proprioception normal   Sharp/dull discrimination normal       Right: Filament test present   Pulses Dorsalis Pedis:  present   Reflexes 2+    Vibratory sensation absent   Proprioception normal   Sharp/dull discrimination normal     Result Review :     CMP    CMP 12/9/21 3/18/22 6/21/22   Glucose 176 (A) 155 (A) 207 (A)   BUN 17 13 21   Creatinine 1.06 0.99 1.04   eGFR Non  Am 66     eGFR  Am 76     Sodium 138 143 140   Potassium 4.4 4.7 4.6   Chloride 102 103 102   Calcium 8.8 9.4 9.4   Total Protein 6.3  6.5   Albumin 4.3  4.5   Globulin 2.0  2.0   Total Bilirubin 0.9  1.1   Alkaline Phosphatase 98  90   AST (SGOT) 18  18   ALT (SGPT) 15  16   (A) Abnormal value       Comments are available for some flowsheets but are not being displayed.           Lipid Panel    Lipid Panel 12/9/21 6/21/22   Total Cholesterol 92 (A) 99 (A)   Triglycerides 75 61   HDL Cholesterol 35 (A) 39 (A)   VLDL Cholesterol 16 14   LDL Cholesterol  41 46   LDL/HDL Ratio 1.2 1.2   (A) Abnormal value       Comments are available for some flowsheets but are not being displayed.           TSH    TSH 12/9/21 6/21/22   TSH 1.530 1.950           Most Recent A1C    HGBA1C Most Recent 11/8/22   Hemoglobin A1C 385               A1C Last 3 Results    HGBA1C Last 3 Results 3/18/22 6/21/22 11/8/22   Hemoglobin A1C 8.7 (A) 9.9 (A) 385   (A) Abnormal value       Comments are available for some flowsheets but are not being displayed.                          Assessment and Plan    Diagnoses and all orders for this visit:    1. Type 2 diabetes mellitus with other specified complication, unspecified whether long term insulin use (HCC) (Primary)  -     POC Glycated Hemoglobin, Total  -     POC Glucose Fingerstick  -     Continuous Blood Gluc Sensor (Dexcom G6 Sensor); Dipsense Dexcom G6 Sensors, to replace every 10 days, 3 sensors per 30 day period (NDC #[address]-96). Check 6 times a day. Dx: E 1  Dispense: 9 each; Refill: 4  -     Continuous Blood Gluc Transmit (Dexcom G6 Transmitter) misc; 1 each Every 3 (Three) Months. Dispense 1 Dexcom G6 Transmitter for each 3 month period (NDC #[address]-24). Check 6 times a day. Dx: E 1  Dispense: 1 each; Refill: 4  -     Continuous Blood Gluc  (Dexcom G6 ) device; 1 Device 1 (One) Time for 1 dose. Dispense 1 Dexcom G6  (NDC #[address]-82). Check 6 times a day. Dx: E 1  Dispense: 1 each; Refill: 1  -     Ambulatory Referral to Diabetic Education  -     Hemoglobin A1c  -     Comprehensive Metabolic Panel  -     Lipid Panel  -     Microalbumin / Creatinine Urine Ratio - Urine, Clean Catch  -     CBC Auto Differential  -     Fructosamine  -     Insulin Degludec (Tresiba FlexTouch) 200 UNIT/ML solution pen-injector pen injection; Take 40 units s/c injection daily  Dispense: 45 mL; Refill: 3  -     insulin aspart (NovoLOG FlexPen) 100 UNIT/ML solution pen-injector sc pen; Take 10 units ten minutes before each meal.  Dispense: 45 mL; Refill: 3  -     Insulin Pen Needle 31G X 5 MM misc; Use 4 needles a day  Dispense: 400 each; Refill: 3  -     metFORMIN ER (GLUCOPHAGE-XR) 500 MG 24 hr tablet; Take two tabs twice a day with food  Dispense: 360 tablet; Refill: 3  -     Hemoglobin A1c; Future  -     Comprehensive Metabolic Panel; Future  -     Lipid Panel; Future  -     Microalbumin / Creatinine Urine Ratio - Urine, Clean Catch; Future  -     Vitamin D,25-Hydroxy; Future  -     TSH; Future  -     Vitamin B12 &  Folate; Future    2. Essential hypertension  -     Comprehensive Metabolic Panel  -     Microalbumin / Creatinine Urine Ratio - Urine, Clean Catch  -     Comprehensive Metabolic Panel; Future  -     Microalbumin / Creatinine Urine Ratio - Urine, Clean Catch; Future    3. Other hyperlipidemia  -     Lipid Panel  -     TSH  -     Comprehensive Metabolic Panel; Future  -     Lipid Panel; Future  -     TSH; Future    4. Vitamin D deficiency  -     Vitamin D,25-Hydroxy  -     Vitamin D,25-Hydroxy; Future    5. Vitamin B 12 deficiency  -     Vitamin B12 & Folate  -     Vitamin B12 & Folate; Future          Follow Up   Return in about 3 months (around 2/8/2023) for Follow-up in 3 months, with labs 1 week prior to the appointment..  Patient was given instructions and counseling regarding his condition or for health maintenance advice. Please see specific information pulled into the AVS if appropriate.     1. Check fingerstick glucose as directed.  We will order a continuous glucose monitor CGM.  We will place a CGM in the office.    2. Restrict carb intake.  We will refer to diabetes education for meal review, meal planning and overall diabetic diet education.  3. He is encouraged to maintain/intensify his exercise routine.  4. Medication:   We will increase his Tresiba to 40 units daily.  We will have him start NovoLog 10 units before each meal.  Restart metformin extended release formulation 500 mg tablet, 2 tablets twice a day with food.  5. Diabetic foot care as directed  6. Follow up with ophthalmology per schedule for comprehensive diabetic eye exam   7. We will do labs now and we will have him RTC in 3 months with repeat labs.  Patient is to call the office if he has 2 or more blood sugars below 70 mg/dL or after implementation of his new diabetes regimen if he continues to have blood sugars > 300 mg/dL.    Rest of the issues are as above.

## 2022-11-09 ENCOUNTER — TELEPHONE (OUTPATIENT)
Dept: ENDOCRINOLOGY | Age: 82
End: 2022-11-09

## 2022-11-09 NOTE — TELEPHONE ENCOUNTER
Patient called stating that he was having dexcom issue  Pt explained to me what was going on let patient know that I will contact the rep for dexcom bridget mckeon she can give better information   He voice understanding  bridget was notified by email to contact patient

## 2022-11-14 ENCOUNTER — TELEPHONE (OUTPATIENT)
Dept: ENDOCRINOLOGY | Age: 82
End: 2022-11-14

## 2022-11-15 ENCOUNTER — TELEPHONE (OUTPATIENT)
Dept: ENDOCRINOLOGY | Age: 82
End: 2022-11-15

## 2022-11-15 NOTE — TELEPHONE ENCOUNTER
PT CALLED STATED THAT HE IS COMPLETELY OUT OF HIS MEDICATION AND NEEDS A REFILL OF      glucose blood (OneTouch Ultra) test strip [78250]     AND WOULD LIKE THEM SENT TO [facility]. AT [facility]

## 2022-11-16 ENCOUNTER — PRIOR AUTHORIZATION (OUTPATIENT)
Dept: ENDOCRINOLOGY | Age: 82
End: 2022-11-16

## 2022-11-16 ENCOUNTER — TELEPHONE (OUTPATIENT)
Dept: ENDOCRINOLOGY | Age: 82
End: 2022-11-16

## 2022-11-16 DIAGNOSIS — E11.69 TYPE 2 DIABETES MELLITUS WITH OTHER SPECIFIED COMPLICATION, UNSPECIFIED WHETHER LONG TERM INSULIN USE: ICD-10-CM

## 2022-11-16 RX ORDER — PROCHLORPERAZINE 25 MG/1
SUPPOSITORY RECTAL
Qty: 9 EACH | Refills: 3 | Status: SHIPPED | OUTPATIENT
Start: 2022-11-16 | End: 2022-11-17 | Stop reason: SDUPTHER

## 2022-11-16 NOTE — TELEPHONE ENCOUNTER
PT CAME IN FOR MED REFILLS FOR THE BELOW MEDS, PT IS OUT OF HIS SENSORS NOW.    Continuous Blood Gluc Sensor (Dexcom G6 Sensor) [649641]    glucose blood (OneTouch Ultra) test strip [89262]     GOING TO:    [facility] [address] [facility] [phone]

## 2022-11-16 NOTE — TELEPHONE ENCOUNTER
11/16 sent in the sensors and the testing strips to [facility]   
PT CALLED IN AND SAID THAT HE IS COMPLETELY OUT OF     glucose blood (OneTouch Ultra) test strip [[id number]]    AND WOULD LIKE THEM SENT TO [facility]. [facility] [address] [facility]   
2

## 2022-11-17 DIAGNOSIS — E11.69 TYPE 2 DIABETES MELLITUS WITH OTHER SPECIFIED COMPLICATION, UNSPECIFIED WHETHER LONG TERM INSULIN USE: ICD-10-CM

## 2022-11-17 RX ORDER — PROCHLORPERAZINE 25 MG/1
SUPPOSITORY RECTAL
Qty: 9 EACH | Refills: 6 | Status: SHIPPED | OUTPATIENT
Start: 2022-11-17 | End: 2022-12-08

## 2022-11-17 RX ORDER — PROCHLORPERAZINE 25 MG/1
SUPPOSITORY RECTAL
Qty: 9 EACH | Refills: 3 | Status: CANCELLED | OUTPATIENT
Start: 2022-11-17

## 2022-11-18 ENCOUNTER — TELEPHONE (OUTPATIENT)
Dept: ENDOCRINOLOGY | Age: 82
End: 2022-11-18

## 2022-11-18 ENCOUNTER — TELEPHONE (OUTPATIENT)
Dept: INTERNAL MEDICINE | Facility: CLINIC | Age: 82
End: 2022-11-18

## 2022-11-18 DIAGNOSIS — E11.69 TYPE 2 DIABETES MELLITUS WITH OTHER SPECIFIED COMPLICATION, UNSPECIFIED WHETHER LONG TERM INSULIN USE: ICD-10-CM

## 2022-11-18 NOTE — TELEPHONE ENCOUNTER
[phone]      Caller: Briseida Teixeira    Relationship: Emergency Contact    Best call back number: [phone]    What is the best time to reach you: ANYTIME     Who are you requesting to speak with (clinical staff, provider,  specific staff member): DR. MENDEZ OR             ASSISTANT     What was the call regarding: PATIENT WAS RECENTLY ON INSULIN. PATIENT STATES THEY ARE STRUGGLING TO GET THE TEST STRIPS TO WORK. PATIENT STATES HE ALSO USES A PATCH THAT GETS REPLACED EVERY COUPLE OF DAYS. PATIENT STATES THEY HAVE BEEN HAVING TROUBLE GETTING THE PATCH AND IT IS DUE TO BE REPLACED TODAY. THIS IS CAUSING THE PATIENT A GREAT DEAL OF ANXIETY. PATIENT IS ALSO EXPERIENCING VERY HIGH SUGAR LEVELS, SAYING IT IS OVER 400.     Do you require a callback: YES

## 2022-11-18 NOTE — TELEPHONE ENCOUNTER
Pt called in and dexcom sensors never made it to [facility] pharmacy. It was sent to [facility]. He is asking for them to be sent to [facility] on [address]. Pt is completely out of sensors and took the one off and now has an alarm going off. Can you call him when the prescription is sent. [phone]

## 2022-11-21 ENCOUNTER — TELEPHONE (OUTPATIENT)
Dept: INTERNAL MEDICINE | Facility: CLINIC | Age: 82
End: 2022-11-21

## 2022-11-21 DIAGNOSIS — E11.65 TYPE 2 DIABETES MELLITUS WITH HYPERGLYCEMIA, WITHOUT LONG-TERM CURRENT USE OF INSULIN: ICD-10-CM

## 2022-11-21 DIAGNOSIS — I15.9 SECONDARY HYPERTENSION: Primary | ICD-10-CM

## 2022-11-21 NOTE — TELEPHONE ENCOUNTER
Caller: Urban Teixeira    Relationship: Self    Best call back number: [phone]    What is the medical concern/diagnosis: DIABETES    What specialty or service is being requested: ENDOCRINOLOGY    What is the provider, practice or medical service name: [facility]     What is the office location: [address] [place] [address]    What is the office phone number: [phone]    Any additional details: PATIENT IS NOT SATISFIED WITH THE TREATMENT WITH DR. DINERO'S OFFICE AND HE WOULD LIKE TO SWITCH TO THIS PROVIDER.     PLEASE ADVISE

## 2022-11-22 ENCOUNTER — TELEPHONE (OUTPATIENT)
Dept: INTERNAL MEDICINE | Facility: CLINIC | Age: 82
End: 2022-11-22

## 2022-11-30 ENCOUNTER — TELEPHONE (OUTPATIENT)
Dept: INTERNAL MEDICINE | Facility: CLINIC | Age: 82
End: 2022-11-30

## 2022-11-30 ENCOUNTER — OFFICE VISIT (OUTPATIENT)
Dept: INTERNAL MEDICINE | Facility: CLINIC | Age: 82
End: 2022-11-30

## 2022-11-30 VITALS
DIASTOLIC BLOOD PRESSURE: 80 MMHG | SYSTOLIC BLOOD PRESSURE: 122 MMHG | TEMPERATURE: 96.8 F | BODY MASS INDEX: 21.19 KG/M2 | OXYGEN SATURATION: 98 % | HEART RATE: 85 BPM | HEIGHT: 70 IN | WEIGHT: 148 LBS

## 2022-11-30 DIAGNOSIS — E11.65 TYPE 2 DIABETES MELLITUS WITH HYPERGLYCEMIA, WITHOUT LONG-TERM CURRENT USE OF INSULIN: Primary | ICD-10-CM

## 2022-11-30 DIAGNOSIS — E55.9 VITAMIN D DEFICIENCY: ICD-10-CM

## 2022-11-30 PROCEDURE — G0439 PPPS, SUBSEQ VISIT: HCPCS | Performed by: INTERNAL MEDICINE

## 2022-11-30 PROCEDURE — 1170F FXNL STATUS ASSESSED: CPT | Performed by: INTERNAL MEDICINE

## 2022-11-30 PROCEDURE — 1159F MED LIST DOCD IN RCRD: CPT | Performed by: INTERNAL MEDICINE

## 2022-11-30 NOTE — PATIENT INSTRUCTIONS
Medicare Wellness  Personal Prevention Plan of Service     Date of Office Visit:    Encounter Provider:  Carmela Sweet MD  Place of Service:  [facility]  Patient Name: Urban Teixeira  :  1940    As part of the Medicare Wellness portion of your visit today, we are providing you with this personalized preventive plan of services (PPPS). This plan is based upon recommendations of the [place] Preventive Services Task Force (USPSTF) and the Advisory Committee on Immunization Practices (ACIP).    This lists the preventive care services that should be considered, and provides dates of when you are due. Items listed as completed are up-to-date and do not require any further intervention.    Health Maintenance   Topic Date Due    ZOSTER VACCINE (1 of 2) Never done    Pneumococcal Vaccine 65+ (2 - PCV) 2016    URINE MICROALBUMIN  2022    COVID-19 Vaccine (4 - Booster for Pfizer series) 2022 (Originally 12/3/2021)    HEMOGLOBIN A1C  2023    LIPID PANEL  2023    DIABETIC EYE EXAM  2023    ANNUAL WELLNESS VISIT  2023    TDAP/TD VACCINES (2 - Td or Tdap) 2024    COLORECTAL CANCER SCREENING  2029    INFLUENZA VACCINE  Completed       Orders Placed This Encounter   Procedures    Comprehensive Metabolic Panel     Order Specific Question:   Release to patient     Answer:   Routine Release    Hemoglobin A1c     Order Specific Question:   Release to patient     Answer:   Routine Release    Vitamin B12     Order Specific Question:   Release to patient     Answer:   Routine Release    Vitamin D,25-Hydroxy     Order Specific Question:   Release to patient     Answer:   Routine Release    Folate     Order Specific Question:   Release to patient     Answer:   Routine Release    CBC & Differential     Order Specific Question:   Manual Differential     Answer:   No       No follow-ups on file.

## 2022-11-30 NOTE — PROGRESS NOTES
The ABCs of the Annual Wellness Visit  Subsequent Medicare Wellness Visit    Chief Complaint   Patient presents with   • Medicare Wellness-subsequent      Subjective    History of Present Illness:  Urban Teixeira is a 82 y.o. male who presents for a Subsequent Medicare Wellness Visit.    The following portions of the patient's history were reviewed and   updated as appropriate: allergies, current medications, past medical history, past social history and problem list.    Compared to one year ago, the patient feels his physical   health is worse.more fatigue    Compared to one year ago, the patient feels his mental   health is worse.stressed about checking sugars and using insulin    Recent Hospitalizations:  He was not admitted to the hospital during the last year.       Current Medical Providers:  Patient Care Team:  Carmela Sweet MD as PCP - General (Internal Medicine)    Outpatient Medications Prior to Visit   Medication Sig Dispense Refill   • Acetaminophen (TYLENOL ARTHRITIS PAIN PO) Take 1 tablet by mouth As Needed.     • atorvastatin (LIPITOR) 80 MG tablet TAKE 1 TABLET BY MOUTH EVERY DAY AT NIGHT 90 tablet 1   • BABY ASPIRIN PO Take 81 mg by mouth Daily.     • Blood Glucose Monitoring Suppl (ONE TOUCH ULTRA 2) w/Device kit 1 each by Other route See Admin Instructions. 1 each 0   • carvedilol (COREG) 12.5 MG tablet TAKE 1 TABLET BY MOUTH TWICE A DAY WITH MEALS 180 tablet 1   • Continuous Blood Gluc Sensor (Dexcom G6 Sensor) Apply  topically Every 10 (Ten) Days. Dx code E11.65 9 each 6   • Continuous Blood Gluc Transmit (Dexcom G6 Transmitter) misc 1 each Every 3 (Three) Months. Dispense 1 Dexcom G6 Transmitter for each 3 month period (NDC #[address]-12). Check 6 times a day. Dx: E 1 1 each 4   • glucose blood (OneTouch Ultra) test strip 1 each by Other route 3 (Three) Times a Day. 300 each 6   • insulin aspart (NovoLOG FlexPen) 100 UNIT/ML solution pen-injector sc pen Take 10 units ten minutes before each  meal. 45 mL 3   • Insulin Degludec (Tresiba FlexTouch) 200 UNIT/ML solution pen-injector pen injection Take 40 units s/c injection daily 45 mL 3   • Insulin Pen Needle 31G X 5 MM misc Use 4 needles a day 400 each 3   • losartan (COZAAR) 50 MG tablet TAKE 1 TABLET BY MOUTH EVERYDAY AT BEDTIME 90 tablet 1   • metFORMIN ER (GLUCOPHAGE-XR) 500 MG 24 hr tablet Take two tabs twice a day with food 360 tablet 3   • Multiple Vitamins-Minerals (PRESERVISION AREDS) capsule Take 1 tablet by mouth 2 (Two) Times a Day.     • Pharmacist Choice Lancets misc 1 each 3 (Three) Times a Day. 300 each 3   • rivaroxaban (XARELTO) 20 MG tablet Take 20 mg by mouth Daily.       No facility-administered medications prior to visit.       No opioid medication identified on active medication list. I have reviewed chart for other potential  high risk medication/s and harmful drug interactions in the elderly.          Aspirin is on active medication list. Aspirin use is indicated based on review of current medical condition/s. Pros and cons of this therapy have been discussed today. Benefits of this medication outweigh potential harm.  Patient has been encouraged to continue taking this medication.  .      Patient Active Problem List   Diagnosis   • Bladder retention   • Hernia, inguinal, right   • Cervical disc disorder with radiculopathy of mid-cervical region   • Degeneration of intervertebral disc of mid-cervical region   • Herniated cervical disc   • Coronary artery disease   • Hypertension   • Type 2 diabetes mellitus with hyperglycemia ([facility])   • Myocardial infarction (old)   • Macular degeneration   • Hyperlipidemia   • S/P CABG x 3   • S/P PTCA (percutaneous transluminal coronary angioplasty)   • Paroxysmal atrial fibrillation ([facility])   • Bell palsy   • NSTEMI (non-ST elevated myocardial infarction) ([facility])   • S/P drug eluting coronary stent placement   • Clinical diagnosis of COVID-19     Advance Care Planning  Advance Directive is not on  "file.  ACP discussion was held with the patient during this visit. Patient has an advance directive (not in EMR), copy requested.          Objective    Vitals:    11/30/22 0956   BP: 122/80   Pulse: 85   Temp: 96.8 °F (36 °C)   SpO2: 98%   Weight: 67.1 kg (148 lb)   Height: 177.8 cm (70\")     Estimated body mass index is 21.24 kg/m² as calculated from the following:    Height as of this encounter: 177.8 cm (70\").    Weight as of this encounter: 67.1 kg (148 lb).    BMI is within normal parameters. No other follow-up for BMI required.      Does the patient have evidence of cognitive impairment? Yes    Physical Exam  Lab Results   Component Value Date    HGBA1C 385 11/08/2022            HEALTH RISK ASSESSMENT    Smoking Status:  Social History     Tobacco Use   Smoking Status Never   Smokeless Tobacco Never     Alcohol Consumption:  Social History     Substance and Sexual Activity   Alcohol Use No     Fall Risk Screen:    STEADI Fall Risk Assessment was completed, and patient is at HIGH risk for falls. Assessment completed on:11/30/2022    Depression Screening:  PHQ-2/PHQ-9 Depression Screening 11/30/2022   Retired PHQ-9 Total Score -   Retired Total Score -   Little Interest or Pleasure in Doing Things 0-->not at all   Feeling Down, Depressed or Hopeless 0-->not at all   PHQ-9: Brief Depression Severity Measure Score 0       Health Habits and Functional and Cognitive Screening:  Functional & Cognitive Status 11/30/2022   Do you have difficulty preparing food and eating? No   Do you have difficulty bathing yourself, getting dressed or grooming yourself? No   Do you have difficulty using the toilet? No   Do you have difficulty moving around from place to place? No   Do you have trouble with steps or getting out of a bed or a chair? No   Current Diet Well Balanced Diet   Dental Exam Up to date   Eye Exam Up to date   Exercise (times per week) 0 times per week   Current Exercises Include No Regular Exercise;Walking "   Current Exercise Activities Include -   Do you need help using the phone?  No   Are you deaf or do you have serious difficulty hearing?  No   Do you need help with transportation? No   Do you need help shopping? No   Do you need help preparing meals?  No   Do you need help with housework?  No   Do you need help with laundry? No   Do you need help taking your medications? No   Do you need help managing money? No   Do you ever drive or ride in a car without wearing a seat belt? No   Have you felt unusual stress, anger or loneliness in the last month? No   Who do you live with? Spouse   If you need help, do you have trouble finding someone available to you? No   Have you been bothered in the last four weeks by sexual problems? No   Do you have difficulty concentrating, remembering or making decisions? No       Age-appropriate Screening Schedule:  Refer to the list below for future screening recommendations based on patient's age, sex and/or medical conditions. Orders for these recommended tests are listed in the plan section. The patient has been provided with a written plan.    Health Maintenance   Topic Date Due   • ZOSTER VACCINE (1 of 2) Never done   • URINE MICROALBUMIN  06/08/2022   • HEMOGLOBIN A1C  05/08/2023   • LIPID PANEL  06/21/2023   • DIABETIC EYE EXAM  09/21/2023   • TDAP/TD VACCINES (2 - Td or Tdap) 01/01/2024   • INFLUENZA VACCINE  Completed              Assessment & Plan   CMS Preventative Services Quick Reference  Risk Factors Identified During Encounter  Inactivity/Sedentary  diabetes  The above risks/problems have been discussed with the patient.  Follow up actions/plans if indicated are seen below in the Assessment/Plan Section.  Pertinent information has been shared with the patient in the After Visit Summary.    Diagnoses and all orders for this visit:    1. Type 2 diabetes mellitus with hyperglycemia, without long-term current use of insulin (HCC) (Primary)        Follow Up:   No follow-ups  on file.     An After Visit Summary and PPPS were made available to the patient.

## 2022-11-30 NOTE — PROGRESS NOTES
Cece Teixeira is a 82 y.o. male.     History of Present Illness   Fu DM  Seen by endo  They have not explained how to do things so he doesn't understand how to use is and has gotten no response from them  We have rferred to a new endo office who hopefully has a better patient education dept  Polyuria and polydipsia  He says he feels well  He denies any falls    The following portions of the patient's history were reviewed and updated as appropriate: allergies, current medications, past medical history, past social history and problem list.  He does try to watch is diet    Review of Systems    Objective   Physical Exam  Vitals reviewed.   Constitutional:       Appearance: He is well-developed.   HENT:      Head: Normocephalic and atraumatic.      Right Ear: External ear normal.      Left Ear: External ear normal.   Eyes:      Conjunctiva/sclera: Conjunctivae normal.      Pupils: Pupils are equal, round, and reactive to light.   Neck:      Thyroid: No thyromegaly.      Trachea: No tracheal deviation.   Cardiovascular:      Rate and Rhythm: Normal rate and regular rhythm.      Heart sounds: Murmur (4/6 barrington rsb) heard.   Pulmonary:      Effort: Pulmonary effort is normal.      Breath sounds: Normal breath sounds.   Abdominal:      General: Bowel sounds are normal. There is no distension.      Palpations: Abdomen is soft.      Tenderness: There is no abdominal tenderness.   Musculoskeletal:         General: No deformity. Normal range of motion.      Cervical back: Normal range of motion.   Skin:     General: Skin is warm and dry.   Neurological:      Mental Status: He is alert and oriented to person, place, and time.   Psychiatric:         Behavior: Behavior normal.         Thought Content: Thought content normal.         Judgment: Judgment normal.         Vitals:    11/30/22 0956   BP: 122/80   Pulse: 85   Temp: 96.8 °F (36 °C)   SpO2: 98%     Body mass index is 21.24 kg/m².         Assessment & Plan    Diagnoses and all orders for this visit:    1. Type 2 diabetes mellitus with hyperglycemia, without long-term current use of insulin (HCC) (Primary)  -     CBC & Differential  -     Comprehensive Metabolic Panel  -     Hemoglobin A1c  -     Vitamin B12  -     Vitamin D,25-Hydroxy  -     Folate    2. Vitamin D deficiency  -     CBC & Differential  -     Comprehensive Metabolic Panel  -     Hemoglobin A1c  -     Vitamin B12  -     Vitamin D,25-Hydroxy  -     Folate        1. DM- poorly controlled  Soon t see a new endo doctor to hep with management   He feels ok  Not walking as much  Sugars have not really gone down much since starting the insulin  He is going to increase PA as tolerated  HE is thirsty all the time  .  We liang get labs today

## 2022-11-30 NOTE — TELEPHONE ENCOUNTER
PATIENT CALLING TO LET  KNOW THAT HE SEES DR.LAURA MITCHELL FOR ENDOCRINOLOGIST THE  P:[phone] HE ALSO HAS AN APPOINTMENT ON 12/21/22.

## 2022-12-01 LAB
25(OH)D3+25(OH)D2 SERPL-MCNC: 12.3 NG/ML (ref 30–100)
ALBUMIN SERPL-MCNC: 4.4 G/DL (ref 3.5–5.2)
ALBUMIN/GLOB SERPL: 2.6 G/DL
ALP SERPL-CCNC: 92 U/L (ref 39–117)
ALT SERPL-CCNC: 17 U/L (ref 1–41)
AST SERPL-CCNC: 18 U/L (ref 1–40)
BASOPHILS # BLD AUTO: 0.02 10*3/MM3 (ref 0–0.2)
BASOPHILS NFR BLD AUTO: 0.4 % (ref 0–1.5)
BILIRUB SERPL-MCNC: 1 MG/DL (ref 0–1.2)
BUN SERPL-MCNC: 15 MG/DL (ref 8–23)
BUN/CREAT SERPL: 14.7 (ref 7–25)
CALCIUM SERPL-MCNC: 8.9 MG/DL (ref 8.6–10.5)
CHLORIDE SERPL-SCNC: 99 MMOL/L (ref 98–107)
CO2 SERPL-SCNC: 29.8 MMOL/L (ref 22–29)
CREAT SERPL-MCNC: 1.02 MG/DL (ref 0.76–1.27)
EGFRCR SERPLBLD CKD-EPI 2021: 73.8 ML/MIN/1.73
EOSINOPHIL # BLD AUTO: 0.06 10*3/MM3 (ref 0–0.4)
EOSINOPHIL NFR BLD AUTO: 1.3 % (ref 0.3–6.2)
ERYTHROCYTE [DISTWIDTH] IN BLOOD BY AUTOMATED COUNT: 12.3 % (ref 12.3–15.4)
FOLATE SERPL-MCNC: 14.8 NG/ML (ref 4.78–24.2)
GLOBULIN SER CALC-MCNC: 1.7 GM/DL
GLUCOSE SERPL-MCNC: 385 MG/DL (ref 65–99)
HBA1C MFR BLD: 11.5 % (ref 4.8–5.6)
HCT VFR BLD AUTO: 40.8 % (ref 37.5–51)
HGB BLD-MCNC: 13.6 G/DL (ref 13–17.7)
IMM GRANULOCYTES # BLD AUTO: 0.01 10*3/MM3 (ref 0–0.05)
IMM GRANULOCYTES NFR BLD AUTO: 0.2 % (ref 0–0.5)
LYMPHOCYTES # BLD AUTO: 0.98 10*3/MM3 (ref 0.7–3.1)
LYMPHOCYTES NFR BLD AUTO: 21.4 % (ref 19.6–45.3)
MCH RBC QN AUTO: 30.2 PG (ref 26.6–33)
MCHC RBC AUTO-ENTMCNC: 33.3 G/DL (ref 31.5–35.7)
MCV RBC AUTO: 90.7 FL (ref 79–97)
MONOCYTES # BLD AUTO: 0.25 10*3/MM3 (ref 0.1–0.9)
MONOCYTES NFR BLD AUTO: 5.5 % (ref 5–12)
NEUTROPHILS # BLD AUTO: 3.25 10*3/MM3 (ref 1.7–7)
NEUTROPHILS NFR BLD AUTO: 71.2 % (ref 42.7–76)
NRBC BLD AUTO-RTO: 0 /100 WBC (ref 0–0.2)
PLATELET # BLD AUTO: 128 10*3/MM3 (ref 140–450)
POTASSIUM SERPL-SCNC: 4.2 MMOL/L (ref 3.5–5.2)
PROT SERPL-MCNC: 6.1 G/DL (ref 6–8.5)
RBC # BLD AUTO: 4.5 10*6/MM3 (ref 4.14–5.8)
SODIUM SERPL-SCNC: 137 MMOL/L (ref 136–145)
VIT B12 SERPL-MCNC: <150 PG/ML (ref 211–946)
WBC # BLD AUTO: 4.57 10*3/MM3 (ref 3.4–10.8)

## 2022-12-01 RX ORDER — ERGOCALCIFEROL 1.25 MG/1
50000 CAPSULE ORAL WEEKLY
Qty: 12 CAPSULE | Refills: 0 | Status: SHIPPED | OUTPATIENT
Start: 2022-12-01

## 2022-12-08 ENCOUNTER — HOSPITAL ENCOUNTER (OUTPATIENT)
Facility: HOSPITAL | Age: 82
Setting detail: OBSERVATION
Discharge: HOME | End: 2022-12-09
Attending: EMERGENCY MEDICINE | Admitting: EMERGENCY MEDICINE

## 2022-12-08 DIAGNOSIS — Z79.4 TYPE 2 DIABETES MELLITUS WITH HYPERGLYCEMIA, WITH LONG-TERM CURRENT USE OF INSULIN: ICD-10-CM

## 2022-12-08 DIAGNOSIS — E11.65 HYPERGLYCEMIA DUE TO DIABETES MELLITUS: Primary | ICD-10-CM

## 2022-12-08 DIAGNOSIS — R73.9 HYPERGLYCEMIA: ICD-10-CM

## 2022-12-08 DIAGNOSIS — E11.65 TYPE 2 DIABETES MELLITUS WITH HYPERGLYCEMIA, WITH LONG-TERM CURRENT USE OF INSULIN: ICD-10-CM

## 2022-12-08 LAB
ALBUMIN SERPL-MCNC: 4.4 G/DL (ref 3.5–5.2)
ALBUMIN/GLOB SERPL: 2.3 G/DL
ALP SERPL-CCNC: 98 U/L (ref 39–117)
ALT SERPL W P-5'-P-CCNC: 14 U/L (ref 1–41)
ANION GAP SERPL CALCULATED.3IONS-SCNC: 10.5 MMOL/L (ref 5–15)
ANION GAP SERPL CALCULATED.3IONS-SCNC: 9 MMOL/L (ref 5–15)
AST SERPL-CCNC: 14 U/L (ref 1–40)
BASOPHILS # BLD AUTO: 0.02 10*3/MM3 (ref 0–0.2)
BASOPHILS NFR BLD AUTO: 0.4 % (ref 0–1.5)
BILIRUB SERPL-MCNC: 0.9 MG/DL (ref 0–1.2)
BILIRUB UR QL STRIP: NEGATIVE
BUN SERPL-MCNC: 21 MG/DL (ref 8–23)
BUN/CREAT SERPL: 15.7 (ref 7–25)
CALCIUM SPEC-SCNC: 9.2 MG/DL (ref 8.6–10.5)
CHLORIDE SERPL-SCNC: 100 MMOL/L (ref 98–107)
CHLORIDE SERPL-SCNC: 89 MMOL/L (ref 98–107)
CLARITY UR: CLEAR
CO2 SERPL-SCNC: 28 MMOL/L (ref 22–29)
CO2 SERPL-SCNC: 28.5 MMOL/L (ref 22–29)
COLOR UR: YELLOW
CREAT SERPL-MCNC: 1.34 MG/DL (ref 0.76–1.27)
DEPRECATED RDW RBC AUTO: 42.6 FL (ref 37–54)
EGFRCR SERPLBLD CKD-EPI 2021: 53.2 ML/MIN/1.73
EOSINOPHIL # BLD AUTO: 0.05 10*3/MM3 (ref 0–0.4)
EOSINOPHIL NFR BLD AUTO: 1.1 % (ref 0.3–6.2)
ERYTHROCYTE [DISTWIDTH] IN BLOOD BY AUTOMATED COUNT: 12.1 % (ref 12.3–15.4)
GLOBULIN UR ELPH-MCNC: 1.9 GM/DL
GLUCOSE BLDC GLUCOMTR-MCNC: 151 MG/DL (ref 70–130)
GLUCOSE BLDC GLUCOMTR-MCNC: 174 MG/DL (ref 70–130)
GLUCOSE BLDC GLUCOMTR-MCNC: 244 MG/DL (ref 70–130)
GLUCOSE BLDC GLUCOMTR-MCNC: 508 MG/DL (ref 70–130)
GLUCOSE SERPL-MCNC: 723 MG/DL (ref 65–99)
GLUCOSE UR STRIP-MCNC: ABNORMAL MG/DL
HBA1C MFR BLD: 13 % (ref 4.8–5.6)
HCT VFR BLD AUTO: 41.2 % (ref 37.5–51)
HGB BLD-MCNC: 13.3 G/DL (ref 13–17.7)
HGB UR QL STRIP.AUTO: NEGATIVE
KETONES UR QL STRIP: NEGATIVE
LEUKOCYTE ESTERASE UR QL STRIP.AUTO: NEGATIVE
LYMPHOCYTES # BLD AUTO: 0.75 10*3/MM3 (ref 0.7–3.1)
LYMPHOCYTES NFR BLD AUTO: 16.4 % (ref 19.6–45.3)
MCH RBC QN AUTO: 30.6 PG (ref 26.6–33)
MCHC RBC AUTO-ENTMCNC: 32.3 G/DL (ref 31.5–35.7)
MCV RBC AUTO: 94.7 FL (ref 79–97)
MONOCYTES # BLD AUTO: 0.33 10*3/MM3 (ref 0.1–0.9)
MONOCYTES NFR BLD AUTO: 7.2 % (ref 5–12)
NEUTROPHILS NFR BLD AUTO: 3.4 10*3/MM3 (ref 1.7–7)
NEUTROPHILS NFR BLD AUTO: 74.7 % (ref 42.7–76)
NITRITE UR QL STRIP: NEGATIVE
PH UR STRIP.AUTO: 5.5 [PH] (ref 5–8)
PLATELET # BLD AUTO: 134 10*3/MM3 (ref 140–450)
PMV BLD AUTO: 9.9 FL (ref 6–12)
POTASSIUM SERPL-SCNC: 4.3 MMOL/L (ref 3.5–5.2)
POTASSIUM SERPL-SCNC: 4.8 MMOL/L (ref 3.5–5.2)
PROT SERPL-MCNC: 6.3 G/DL (ref 6–8.5)
PROT UR QL STRIP: NEGATIVE
RBC # BLD AUTO: 4.35 10*6/MM3 (ref 4.14–5.8)
SODIUM SERPL-SCNC: 126 MMOL/L (ref 136–145)
SODIUM SERPL-SCNC: 139 MMOL/L (ref 136–145)
SP GR UR STRIP: >=1.03 (ref 1–1.03)
UROBILINOGEN UR QL STRIP: ABNORMAL
WBC NRBC COR # BLD: 4.56 10*3/MM3 (ref 3.4–10.8)

## 2022-12-08 PROCEDURE — 82962 GLUCOSE BLOOD TEST: CPT

## 2022-12-08 PROCEDURE — 96361 HYDRATE IV INFUSION ADD-ON: CPT

## 2022-12-08 PROCEDURE — 63710000001 INSULIN REGULAR HUMAN PER 5 UNITS

## 2022-12-08 PROCEDURE — 83036 HEMOGLOBIN GLYCOSYLATED A1C: CPT | Performed by: NURSE PRACTITIONER

## 2022-12-08 PROCEDURE — 80053 COMPREHEN METABOLIC PANEL: CPT | Performed by: NURSE PRACTITIONER

## 2022-12-08 PROCEDURE — 96360 HYDRATION IV INFUSION INIT: CPT

## 2022-12-08 PROCEDURE — 80051 ELECTROLYTE PANEL: CPT | Performed by: NURSE PRACTITIONER

## 2022-12-08 PROCEDURE — G0378 HOSPITAL OBSERVATION PER HR: HCPCS

## 2022-12-08 PROCEDURE — 81003 URINALYSIS AUTO W/O SCOPE: CPT | Performed by: NURSE PRACTITIONER

## 2022-12-08 PROCEDURE — 99284 EMERGENCY DEPT VISIT MOD MDM: CPT

## 2022-12-08 PROCEDURE — 85025 COMPLETE CBC W/AUTO DIFF WBC: CPT | Performed by: NURSE PRACTITIONER

## 2022-12-08 PROCEDURE — 63710000001 INSULIN LISPRO (HUMAN) PER 5 UNITS: Performed by: NURSE PRACTITIONER

## 2022-12-08 PROCEDURE — 99291 CRITICAL CARE FIRST HOUR: CPT

## 2022-12-08 RX ORDER — ONDANSETRON 2 MG/ML
4 INJECTION INTRAMUSCULAR; INTRAVENOUS EVERY 6 HOURS PRN
Status: DISCONTINUED | OUTPATIENT
Start: 2022-12-08 | End: 2022-12-09 | Stop reason: HOSPADM

## 2022-12-08 RX ORDER — SENNOSIDES 8.6 MG
650 CAPSULE ORAL EVERY 8 HOURS PRN
COMMUNITY

## 2022-12-08 RX ORDER — LOSARTAN POTASSIUM 50 MG/1
50 TABLET ORAL
Status: DISCONTINUED | OUTPATIENT
Start: 2022-12-08 | End: 2022-12-09 | Stop reason: HOSPADM

## 2022-12-08 RX ORDER — PANTOPRAZOLE SODIUM 40 MG/1
40 TABLET, DELAYED RELEASE ORAL DAILY
Status: DISCONTINUED | OUTPATIENT
Start: 2022-12-08 | End: 2022-12-09 | Stop reason: HOSPADM

## 2022-12-08 RX ORDER — GLIMEPIRIDE 4 MG/1
4 TABLET ORAL
COMMUNITY
End: 2022-12-09 | Stop reason: HOSPADM

## 2022-12-08 RX ORDER — INSULIN LISPRO 100 [IU]/ML
0-24 INJECTION, SOLUTION INTRAVENOUS; SUBCUTANEOUS
Status: DISCONTINUED | OUTPATIENT
Start: 2022-12-08 | End: 2022-12-09 | Stop reason: HOSPADM

## 2022-12-08 RX ORDER — DEXTROSE MONOHYDRATE 25 G/50ML
25 INJECTION, SOLUTION INTRAVENOUS
Status: DISCONTINUED | OUTPATIENT
Start: 2022-12-08 | End: 2022-12-09 | Stop reason: HOSPADM

## 2022-12-08 RX ORDER — BACLOFEN 10 MG/1
5 TABLET ORAL 2 TIMES DAILY
Status: DISCONTINUED | OUTPATIENT
Start: 2022-12-08 | End: 2022-12-09 | Stop reason: HOSPADM

## 2022-12-08 RX ORDER — ONDANSETRON 4 MG/1
4 TABLET, FILM COATED ORAL EVERY 6 HOURS PRN
Status: DISCONTINUED | OUTPATIENT
Start: 2022-12-08 | End: 2022-12-09 | Stop reason: HOSPADM

## 2022-12-08 RX ORDER — CARVEDILOL 3.12 MG/1
12.5 TABLET ORAL 2 TIMES DAILY WITH MEALS
Status: DISCONTINUED | OUTPATIENT
Start: 2022-12-08 | End: 2022-12-09 | Stop reason: HOSPADM

## 2022-12-08 RX ORDER — ASPIRIN 81 MG/1
81 TABLET, CHEWABLE ORAL DAILY
Status: DISCONTINUED | OUTPATIENT
Start: 2022-12-08 | End: 2022-12-09 | Stop reason: HOSPADM

## 2022-12-08 RX ORDER — SODIUM CHLORIDE 0.9 % (FLUSH) 0.9 %
10 SYRINGE (ML) INJECTION AS NEEDED
Status: DISCONTINUED | OUTPATIENT
Start: 2022-12-08 | End: 2022-12-09 | Stop reason: HOSPADM

## 2022-12-08 RX ORDER — SODIUM CHLORIDE, SODIUM LACTATE, POTASSIUM CHLORIDE, CALCIUM CHLORIDE 600; 310; 30; 20 MG/100ML; MG/100ML; MG/100ML; MG/100ML
100 INJECTION, SOLUTION INTRAVENOUS CONTINUOUS
Status: DISCONTINUED | OUTPATIENT
Start: 2022-12-08 | End: 2022-12-09 | Stop reason: HOSPADM

## 2022-12-08 RX ORDER — SODIUM CHLORIDE 0.9 % (FLUSH) 0.9 %
10 SYRINGE (ML) INJECTION EVERY 12 HOURS SCHEDULED
Status: DISCONTINUED | OUTPATIENT
Start: 2022-12-08 | End: 2022-12-09 | Stop reason: HOSPADM

## 2022-12-08 RX ORDER — CHOLECALCIFEROL (VITAMIN D3) 125 MCG
5 CAPSULE ORAL NIGHTLY PRN
Status: DISCONTINUED | OUTPATIENT
Start: 2022-12-08 | End: 2022-12-09 | Stop reason: HOSPADM

## 2022-12-08 RX ORDER — CLOPIDOGREL BISULFATE 75 MG/1
75 TABLET ORAL DAILY
Status: DISCONTINUED | OUTPATIENT
Start: 2022-12-08 | End: 2022-12-09 | Stop reason: HOSPADM

## 2022-12-08 RX ORDER — BACLOFEN 10 MG/1
5 TABLET ORAL 2 TIMES DAILY
COMMUNITY

## 2022-12-08 RX ORDER — NITROGLYCERIN 0.4 MG/1
0.4 TABLET SUBLINGUAL
Status: DISCONTINUED | OUTPATIENT
Start: 2022-12-08 | End: 2022-12-09 | Stop reason: HOSPADM

## 2022-12-08 RX ORDER — ATORVASTATIN CALCIUM 80 MG/1
80 TABLET, FILM COATED ORAL NIGHTLY
Status: DISCONTINUED | OUTPATIENT
Start: 2022-12-08 | End: 2022-12-09 | Stop reason: HOSPADM

## 2022-12-08 RX ORDER — ACETAMINOPHEN 325 MG/1
325 TABLET ORAL EVERY 4 HOURS PRN
Status: DISCONTINUED | OUTPATIENT
Start: 2022-12-08 | End: 2022-12-09 | Stop reason: HOSPADM

## 2022-12-08 RX ORDER — SODIUM CHLORIDE 9 MG/ML
40 INJECTION, SOLUTION INTRAVENOUS AS NEEDED
Status: DISCONTINUED | OUTPATIENT
Start: 2022-12-08 | End: 2022-12-09 | Stop reason: HOSPADM

## 2022-12-08 RX ORDER — CLOPIDOGREL BISULFATE 75 MG/1
75 TABLET ORAL DAILY
COMMUNITY
End: 2022-12-22

## 2022-12-08 RX ORDER — NICOTINE POLACRILEX 4 MG
15 LOZENGE BUCCAL
Status: DISCONTINUED | OUTPATIENT
Start: 2022-12-08 | End: 2022-12-09 | Stop reason: HOSPADM

## 2022-12-08 RX ORDER — PANTOPRAZOLE SODIUM 40 MG/1
40 TABLET, DELAYED RELEASE ORAL DAILY
COMMUNITY

## 2022-12-08 RX ADMIN — LOSARTAN POTASSIUM 50 MG: 50 TABLET, FILM COATED ORAL at 13:45

## 2022-12-08 RX ADMIN — SODIUM CHLORIDE 1000 ML: 9 INJECTION, SOLUTION INTRAVENOUS at 11:01

## 2022-12-08 RX ADMIN — Medication 5 MG: at 20:30

## 2022-12-08 RX ADMIN — BACLOFEN 5 MG: 10 TABLET ORAL at 20:30

## 2022-12-08 RX ADMIN — INSULIN HUMAN 10 UNITS: 100 INJECTION, SOLUTION PARENTERAL at 12:01

## 2022-12-08 RX ADMIN — ASPIRIN 81 MG: 81 TABLET, CHEWABLE ORAL at 13:45

## 2022-12-08 RX ADMIN — Medication 10 ML: at 20:31

## 2022-12-08 RX ADMIN — CLOPIDOGREL 75 MG: 75 TABLET, FILM COATED ORAL at 13:45

## 2022-12-08 RX ADMIN — SODIUM CHLORIDE, POTASSIUM CHLORIDE, SODIUM LACTATE AND CALCIUM CHLORIDE 100 ML/HR: 600; 310; 30; 20 INJECTION, SOLUTION INTRAVENOUS at 13:40

## 2022-12-08 RX ADMIN — Medication 10 ML: at 13:40

## 2022-12-08 RX ADMIN — ATORVASTATIN CALCIUM 80 MG: 80 TABLET, FILM COATED ORAL at 20:30

## 2022-12-08 RX ADMIN — CARVEDILOL 12.5 MG: 3.12 TABLET, FILM COATED ORAL at 17:49

## 2022-12-08 RX ADMIN — INSULIN LISPRO 24 UNITS: 100 INJECTION, SOLUTION INTRAVENOUS; SUBCUTANEOUS at 13:40

## 2022-12-08 NOTE — ED PROVIDER NOTES
EMERGENCY DEPARTMENT ENCOUNTER    Room Number:  27/27  Date of encounter:  12/8/2022  PCP: Carmela Sweet MD  Historian: Patient  Full history not obtainable due to: None    HPI:  Chief Complaint: Hyperglycemia    Context: Urban Teixeira is a 82 y.o. male with a PMH significant for diabetes, hypertension, hyperlipidemia, CAD, A. fib on Xarelto who presents to the ED c/o hyperglycemia.  Patient states that recently his blood sugars have been around 300-400.  About a month ago he was changed from oral medications and is now taking insulin.  He states that his glucometer read high this morning.  He reports increased thirst and urination.  No recent illness, fever, abdominal pain, nausea, vomiting, diarrhea, dysuria.  He has followed with Dr. Oliver with endo.       MEDICAL RECORD REVIEW: 11/30/2022: PCP well visit, type 2 diabetes, vitamin D deficiency  11/15/2022: Pulmonology visit for hiccups, A. fib, follow-up STEMI        PAST MEDICAL HISTORY    Active Ambulatory Problems     Diagnosis Date Noted   • Bladder retention 03/18/2016   • Hernia, inguinal, right 03/18/2016   • Cervical disc disorder with radiculopathy of mid-cervical region 08/31/2016   • Degeneration of intervertebral disc of mid-cervical region 08/31/2016   • Herniated cervical disc 02/24/2017   • Coronary artery disease 02/25/2017   • Hypertension 02/25/2017   • Type 2 diabetes mellitus with hyperglycemia (HCC) 02/25/2017   • Myocardial infarction (old) 02/25/2017   • Macular degeneration 11/26/2019   • Hyperlipidemia 11/26/2019   • S/P CABG x 3 11/26/2019   • S/P PTCA (percutaneous transluminal coronary angioplasty) 01/26/2017   • Paroxysmal atrial fibrillation (HCC) 01/08/2019   • Bell palsy 11/26/2019   • NSTEMI (non-ST elevated myocardial infarction) ([facility]) 03/31/2021   • S/P drug eluting coronary stent placement 08/17/2021   • Clinical diagnosis of COVID-19 08/31/2022     Resolved Ambulatory Problems     Diagnosis Date Noted   • Cervical disc  disorder at C4-C5 level with radiculopathy 02/21/2017   • Type 2 diabetes mellitus with HbA1C goal to be determined ([facility]) 01/08/2019     Past Medical History:   Diagnosis Date   • Arthritis    • Bell's palsy    • BPH (benign prostatic hyperplasia)    • DDD (degenerative disc disease), cervical    • Diabetes mellitus ([facility])    • GERD (gastroesophageal reflux disease)    • GERD (gastroesophageal reflux disease)    • Neck pain          PAST SURGICAL HISTORY  Past Surgical History:   Procedure Laterality Date   • ANTERIOR CERVICAL DISCECTOMY W/ FUSION Left 2/24/2017    Procedure: ANTERIOR CERVICAL DISCECTOMY WITH FUSION WITH INSTRUMENTATION C4/5, C5/6;  Surgeon: Obed Youssef MD;  Location: [facility];  Service:    • CATARACT EXTRACTION Bilateral    • CHOLECYSTECTOMY     • CORONARY ARTERY BYPASS GRAFT      1996 AT [facility] AND [facility]   • CORONARY STENT PLACEMENT     • CYSTOSCOPY LITHOLAPAXY BLADDER STONE EXTRACTION     • CYSTOSCOPY TRANSURETHRAL RESECTION OF PROSTATE     • HERNIA REPAIR     • KNEE ARTHROSCOPY           FAMILY HISTORY  Family History   Problem Relation Age of Onset   • Stroke Mother    • Hypertension Mother    • Glaucoma Mother    • Kidney disease Father    • Heart disease Father    • Hypertension Sister    • Heart disease Sister    • Hypertension Brother    • COPD Sister          SOCIAL HISTORY  Social History     Socioeconomic History   • Marital status:    Tobacco Use   • Smoking status: Never   • Smokeless tobacco: Never   Substance and Sexual Activity   • Alcohol use: No   • Drug use: No         ALLERGIES  Sulfa antibiotics        REVIEW OF SYSTEMS    All systems reviewed and marked as negative except as listed in HPI     PHYSICAL EXAM    I have reviewed the triage vital signs and nursing notes.    ED Triage Vitals [12/08/22 0952]   Temp Heart Rate Resp BP SpO2   96.4 °F (35.8 °C) 89 16 -- 99 %      Temp src Heart Rate Source Patient Position BP Location FiO2 (%)   Tympanic Monitor --  -- --       Physical Exam  Vitals and nursing note reviewed.   Constitutional:       Appearance: Normal appearance.   HENT:      Head: Normocephalic and atraumatic.      Nose: Nose normal.      Mouth/Throat:      Mouth: Mucous membranes are dry.   Eyes:      Conjunctiva/sclera: Conjunctivae normal.      Pupils: Pupils are equal, round, and reactive to light.   Cardiovascular:      Rate and Rhythm: Normal rate and regular rhythm.      Pulses: Normal pulses.      Heart sounds: Normal heart sounds.   Pulmonary:      Effort: Pulmonary effort is normal.      Breath sounds: Normal breath sounds. No wheezing or rhonchi.   Abdominal:      General: Bowel sounds are normal.      Palpations: Abdomen is soft.      Tenderness: There is no abdominal tenderness. There is no guarding.   Musculoskeletal:         General: Normal range of motion.      Cervical back: Normal range of motion and neck supple.   Skin:     General: Skin is warm.      Capillary Refill: Capillary refill takes less than 2 seconds.   Neurological:      Mental Status: He is alert and oriented to person, place, and time. Mental status is at baseline.   Psychiatric:         Mood and Affect: Mood normal.         Vital signs and nursing notes reviewed.      LAB RESULTS  Recent Results (from the past 24 hour(s))   Comprehensive Metabolic Panel    Collection Time: 12/08/22 10:59 AM    Specimen: Blood   Result Value Ref Range    Glucose 723 (C) 65 - 99 mg/dL    BUN 21 8 - 23 mg/dL    Creatinine 1.34 (H) 0.76 - 1.27 mg/dL    Sodium 126 (L) 136 - 145 mmol/L    Potassium 4.8 3.5 - 5.2 mmol/L    Chloride 89 (L) 98 - 107 mmol/L    CO2 28.0 22.0 - 29.0 mmol/L    Calcium 9.2 8.6 - 10.5 mg/dL    Total Protein 6.3 6.0 - 8.5 g/dL    Albumin 4.40 3.50 - 5.20 g/dL    ALT (SGPT) 14 1 - 41 U/L    AST (SGOT) 14 1 - 40 U/L    Alkaline Phosphatase 98 39 - 117 U/L    Total Bilirubin 0.9 0.0 - 1.2 mg/dL    Globulin 1.9 gm/dL    A/G Ratio 2.3 g/dL    BUN/Creatinine Ratio 15.7 7.0 - 25.0     Anion Gap 9.0 5.0 - 15.0 mmol/L    eGFR 53.2 (L) >60.0 mL/min/1.73   Urinalysis With Microscopic If Indicated (No Culture) - Urine, Clean Catch    Collection Time: 12/08/22 10:59 AM    Specimen: Urine, Clean Catch   Result Value Ref Range    Color, UA Yellow Yellow, Straw    Appearance, UA Clear Clear    pH, UA 5.5 5.0 - 8.0    Specific Gravity, UA >=1.030 1.005 - 1.030    Glucose, UA >=1000 mg/dL (3+) (A) Negative    Ketones, UA Negative Negative    Bilirubin, UA Negative Negative    Blood, UA Negative Negative    Protein, UA Negative Negative    Leuk Esterase, UA Negative Negative    Nitrite, UA Negative Negative    Urobilinogen, UA 0.2 E.U./dL 0.2 - 1.0 E.U./dL   CBC Auto Differential    Collection Time: 12/08/22 10:59 AM    Specimen: Blood   Result Value Ref Range    WBC 4.56 3.40 - 10.80 10*3/mm3    RBC 4.35 4.14 - 5.80 10*6/mm3    Hemoglobin 13.3 13.0 - 17.7 g/dL    Hematocrit 41.2 37.5 - 51.0 %    MCV 94.7 79.0 - 97.0 fL    MCH 30.6 26.6 - 33.0 pg    MCHC 32.3 31.5 - 35.7 g/dL    RDW 12.1 (L) 12.3 - 15.4 %    RDW-SD 42.6 37.0 - 54.0 fl    MPV 9.9 6.0 - 12.0 fL    Platelets 134 (L) 140 - 450 10*3/mm3    Neutrophil % 74.7 42.7 - 76.0 %    Lymphocyte % 16.4 (L) 19.6 - 45.3 %    Monocyte % 7.2 5.0 - 12.0 %    Eosinophil % 1.1 0.3 - 6.2 %    Basophil % 0.4 0.0 - 1.5 %    Neutrophils, Absolute 3.40 1.70 - 7.00 10*3/mm3    Lymphocytes, Absolute 0.75 0.70 - 3.10 10*3/mm3    Monocytes, Absolute 0.33 0.10 - 0.90 10*3/mm3    Eosinophils, Absolute 0.05 0.00 - 0.40 10*3/mm3    Basophils, Absolute 0.02 0.00 - 0.20 10*3/mm3       Ordered the above labs and independently reviewed the results.      RADIOLOGY  No Radiology Exams Resulted Within Past 24 Hours    I ordered the above noted radiological studies. Independently reviewed by me and discussed with radiologist.  See dictation above for official radiology interpretation.          MEDICATIONS GIVEN IN ER    Medications   sodium chloride 0.9 % bolus 1,000 mL (1,000 mL  Intravenous New Bag 12/8/22 1101)   insulin regular (humuLIN R,novoLIN R) injection 10 Units (10 Units Intravenous Given 12/8/22 1201)         PROGRESS, DATA ANALYSIS, CONSULTS, AND MEDICAL DECISION MAKING    All labs have been independently reviewed by me.  All radiology studies have been reviewed by me.   EKG's independently reviewed by me.  Discussion below represents my analysis of pertinent findings related to patient's condition, differential diagnosis, treatment plan and final disposition.    DIFFERENTIAL DIAGNOSIS INCLUDE BUT NOT LIMITED TO: DKA, infection, HHS, Cushing Syndrome, pancreatitis     ED Course as of 12/08/22 1207   Thu Dec 08, 2022   1207 Spoke with SARY Levy who agrees to admission to obs unit under. Dr. Cordero. No further recommendations.  [JG]      ED Course User Index  [JG] Temi Tobias APRN       AS OF 12:06 EST VITALS:    BP - 152/75  HR - 68  TEMP - 96.4 °F (35.8 °C) (Tympanic)  02 SATS - 98%    1200: I rechecked the patient.  I discussed the patient's labs, radiology findings (including all incidental findings), diagnosis, and plan for admission.  All questions answered.  He is agreeable.        DIAGNOSIS  Final diagnoses:   Hyperglycemia due to diabetes mellitus (HCC)         DISPOSITION  Admit to obs    Pt masked in first look. I wore a surgical mask throughout my encounters with the pt. I performed hand hygiene on entry into the pt room and upon exit.     Dictated utilizing Dragon dictation     Note Disclaimer: At [facility], we believe that sharing information builds trust and better relationships. You are receiving this note because you recently visited [facility]. It is possible you will see health information before a provider has talked with you about it. This kind of information can be easy to misunderstand. To help you fully understand what it means for your health, we urge you to discuss this note with your provider.      Temi Tobias  APRN  12/08/22 1202

## 2022-12-08 NOTE — NURSING NOTE
Noted DM ed order. Meet with 83 y/o at bedside while his wife is here. Pls see DM ed flowsheet or note for more details.

## 2022-12-08 NOTE — ED PROVIDER NOTES
MD ATTESTATION NOTE    The ELIZABETH and I have discussed this patient's history, physical exam, and treatment plan.  I have reviewed the documentation and personally had a face to face interaction with the patient. I affirm the documentation and agree with the treatment and plan.  The attached note describes my personal findings.      I provided a substantive portion of the care of the patient.  I personally performed the physical exam in its entirety, and below are my findings.  For this patient encounter, the patient wore surgical mask, I wore full protective PPE including N95 and eye protection.      Brief HPI: Patient presents for evaluation of elevated blood sugars.  Patient states he was on oral medications and has now added insulin.  Takes it once a day.  Patient has had elevated blood sugars recently.  Has had polyuria and polydipsia.  Patient met with endocrinologist but did not like him and is scheduled to meet with a new endocrinologist.  Patient has not met with a dietitian.    PHYSICAL EXAM  ED Triage Vitals   Temp Heart Rate Resp BP SpO2   12/08/22 0952 12/08/22 0952 12/08/22 0952 12/08/22 1055 12/08/22 0952   96.4 °F (35.8 °C) 89 16 152/89 99 %      Temp src Heart Rate Source Patient Position BP Location FiO2 (%)   12/08/22 0952 12/08/22 0952 -- -- --   Tympanic Monitor            GENERAL: no acute distress  HENT: nares patent. Mucous membranes are dry    EYES: no scleral icterus  CV: regular rhythm, normal rate  RESPIRATORY: normal effort  ABDOMEN: soft  MUSCULOSKELETAL: no deformity  NEURO: alert, moves all extremities, follows commands  PSYCH:  calm, cooperative  SKIN: warm, dry    Vital signs and nursing notes reviewed.        Plan: Patient with history of elevated blood sugars.  Has been given fluids.  Will check blood chemistries.       Dre Whelan MD  12/08/22 2395

## 2022-12-08 NOTE — PROGRESS NOTES
Clinical Pharmacy Services: Medication History    Urban Teixeira is a 82 y.o. male presenting to [facility] for   Chief Complaint   Patient presents with   • Hyperglycemia       He  has a past medical history of Arthritis, Bell's palsy, BPH (benign prostatic hyperplasia), Coronary artery disease, DDD (degenerative disc disease), cervical, Diabetes mellitus (HCC), GERD (gastroesophageal reflux disease), GERD (gastroesophageal reflux disease), Hyperlipidemia, Hypertension, Myocardial infarction (HCC), and Neck pain.    Allergies as of 12/08/2022 - Reviewed 12/08/2022   Allergen Reaction Noted   • Sulfa antibiotics Rash and Hives 03/18/2016       Medication information was obtained from: Patient   Pharmacy and Phone Number:     Prior to Admission Medications     Prescriptions Last Dose Informant Patient Reported? Taking?    acetaminophen (TYLENOL) 650 MG 8 hr tablet  Self Yes Yes    Take 650 mg by mouth Every 8 (Eight) Hours As Needed for Mild Pain.    atorvastatin (LIPITOR) 80 MG tablet  Self No Yes    TAKE 1 TABLET BY MOUTH EVERY DAY AT NIGHT    Patient taking differently:  Take 80 mg by mouth Every Night.    BABY ASPIRIN PO  Self Yes Yes    Take 81 mg by mouth Daily.    baclofen (LIORESAL) 10 MG tablet  Self Yes Yes    Take 5 mg by mouth 2 (Two) Times a Day.    carvedilol (COREG) 12.5 MG tablet  Self No Yes    TAKE 1 TABLET BY MOUTH TWICE A DAY WITH MEALS    Patient taking differently:  Take 12.5 mg by mouth 2 (Two) Times a Day With Meals.    clopidogrel (PLAVIX) 75 MG tablet  Self Yes Yes    Take 75 mg by mouth Daily.    glimepiride (AMARYL) 4 MG tablet  Self Yes Yes    Take 4 mg by mouth Every Morning Before Breakfast.    insulin aspart (NovoLOG FlexPen) 100 UNIT/ML solution pen-injector sc pen  Self No Yes    Take 10 units ten minutes before each meal.    Patient taking differently:  Inject 11 Units under the skin into the appropriate area as directed 3 (Three) Times a Day With Meals.    Insulin  Degludec (Tresiba FlexTouch) 200 UNIT/ML solution pen-injector pen injection  Self No Yes    Take 40 units s/c injection daily    Patient taking differently:  Inject 60 Units under the skin into the appropriate area as directed Every Morning.    losartan (COZAAR) 50 MG tablet  Self No Yes    TAKE 1 TABLET BY MOUTH EVERYDAY AT BEDTIME    Patient taking differently:  Take 50 mg by mouth Daily.    metFORMIN ER (GLUCOPHAGE-XR) 500 MG 24 hr tablet  Self No Yes    Take two tabs twice a day with food    Patient taking differently:  Take 1,000 mg by mouth 2 (Two) Times a Day.    pantoprazole (PROTONIX) 40 MG EC tablet  Self Yes Yes    Take 40 mg by mouth Daily.    rivaroxaban (XARELTO) 20 MG tablet  Self Yes Yes    Take 20 mg by mouth Daily.    vitamin D (ERGOCALCIFEROL) 1.25 MG (02551 UT) capsule capsule  Self No Yes    Take 1 capsule by mouth 1 (One) Time Per Week.            Medication notes:     This medication list is complete to the best of my knowledge as of 12/8/2022    Please call if questions.    Harsh Garces  Medication History Technician  [phone]    12/8/2022 12:34 EST

## 2022-12-08 NOTE — NURSING NOTE
"Diabetes Education  Assessment/Teaching    Patient Name:  Urban Teixeira  YOB: 1940  MRN: [id number]  Admit Date:  12/8/2022      Assessment Date:  12/8/2022  Flowsheet Row Most Recent Value   General Information     Referral From: APRN/MD order. Meet with 81 y/o at bedside to assess needs for DM ed.    Height 177.8 cm (70\")   Height Method Stated   Weight 66.7 kg (147 lb)   Weight Method Stated   Diabetes History    What type of diabetes do you have? Type 2   Length of Diabetes Diagnosis -- 25 yr hx per endo note.   Current DM knowledge -- pt reports rec'ing insulin instructions in MD office. Review.    Do you test your blood sugar at home? yes   Who performs the test? pt   Have you had low blood sugar? (<70mg/dl) no -pt denies.   Have you had high blood sugar? (>140mg/dl) yes -current a1c 13.   Education Preferences    Barriers to Learning -- pt presents w/somewhat flat affect.   Nutrition Information There is a referral in for outpt ed with our RD. D/w pt, spouse. Encourage.    Assessment Topics    Taking Medication - Assessment Needs education -pt, spouse told me rx'd doses but not able to name the two different rx'd insulins.   Problem Solving - Assessment Needs education -for hyperglycemia tx.   Healthy Coping - Assessment Competent -supportive spouse here at bedside.   Monitoring - Assessment Needs education -pt knows how to perform BG checks w/home meter. Pt indicates a preference to use his meter and not Dexcom device.   DM Goals    Problem Solving - Goal 0-30 days from discharge   Contact Plan Follow-up medical care          Flowsheet Row Most Recent Value   DM Education Needs    Meter Has own   Frequency of Testing 2 times a day -advise pt to continue w/home BG checks. Appears pt does not have CGM supply at home nor is he comfortable with the technology.   Medication Insulins, Administration sites and the need to rotate injection sites, correct insulin pen use -Tresiba name, Novolog name. " "advise pt, spouse to label the pens (one for \"meals\" for example in order to reduce the chance to mix up pens at home.)   Problem Solving Hyperglycemia, Symptoms -advise pt, spouse to contact MD for unremitting high BGs(e.g. 300)   Healthy Coping Appropriate   Discharge Plan Home, Follow-up with MD, Follow-up with PCP   Motivation Engaged   Teaching Method Explanation, Discussion, Teach back   Patient Response Verbalized understanding -encourage pt, spouse to call our office to schedule ed f/u.      Other Comments:  DM educator placed order set for test strips and lancets supply at [place]. Provider needs to sign off at [facility] rec process. Thank you. Advise pt to bring his meter in to pharmacy to be sure they dispense the correct (brand) strips for his meter.  Electronically signed by:  Crystal Miller RN, BSN, [facility]  12/08/22 17:29 EST  "

## 2022-12-08 NOTE — ED NOTES
"Nursing report ED to floor  Urban Teixeira  82 y.o.  male    HPI :   Chief Complaint   Patient presents with    Hyperglycemia       Admitting doctor:   Betito Cordero MD    Admitting diagnosis:   The encounter diagnosis was Hyperglycemia due to diabetes mellitus (HCC).    Code status:   Current Code Status       Date Active Code Status Order ID Comments User Context       12/8/2022 1208 CPR (Attempt to Resuscitate) [id number]  Mildred Izaguirre APRN ED        Question Answer    Code Status (Patient has no pulse and is not breathing) CPR (Attempt to Resuscitate)    Medical Interventions (Patient has pulse or is breathing) Full Support                    Allergies:   Sulfa antibiotics    Isolation:   No active isolations    Intake and Output  No intake or output data in the 24 hours ending 12/08/22 1224    Weight:       12/08/22  1055   Weight: 66.7 kg (147 lb)       Most recent vitals:   Vitals:    12/08/22 0952 12/08/22 1055 12/08/22 1131   BP:  152/89 152/75   Pulse: 89  68   Resp: 16     Temp: 96.4 °F (35.8 °C)     TempSrc: Tympanic     SpO2: 99%  98%   Weight:  66.7 kg (147 lb)    Height:  177.8 cm (70\")        Active LDAs/IV Access:   Lines, Drains & Airways       Active LDAs       Name Placement date Placement time Site Days    Peripheral IV 12/08/22 1057 Right Antecubital 12/08/22  1057  Antecubital  less than 1                    Labs (abnormal labs have a star):   Labs Reviewed   COMPREHENSIVE METABOLIC PANEL - Abnormal; Notable for the following components:       Result Value    Glucose 723 (*)     Creatinine 1.34 (*)     Sodium 126 (*)     Chloride 89 (*)     eGFR 53.2 (*)     All other components within normal limits    Narrative:     GFR Normal >60  Chronic Kidney Disease <60  Kidney Failure <15    The GFR formula is only valid for adults with stable renal function between ages 18 and 70.   URINALYSIS W/ MICROSCOPIC IF INDICATED (NO CULTURE) - Abnormal; Notable for the following components:    Glucose, UA " >=1000 mg/dL (3+) (*)     All other components within normal limits    Narrative:     Urine microscopic not indicated.   CBC WITH AUTO DIFFERENTIAL - Abnormal; Notable for the following components:    RDW 12.1 (*)     Platelets 134 (*)     Lymphocyte % 16.4 (*)     All other components within normal limits   ELECTROLYTE PANEL   ELECTROLYTE PANEL   HEMOGLOBIN A1C   POCT GLUCOSE FINGERSTICK   POCT GLUCOSE FINGERSTICK   POCT GLUCOSE FINGERSTICK   POCT GLUCOSE FINGERSTICK   POCT GLUCOSE FINGERSTICK   POCT GLUCOSE FINGERSTICK   POCT GLUCOSE FINGERSTICK   POCT GLUCOSE FINGERSTICK   POCT GLUCOSE FINGERSTICK   POCT GLUCOSE FINGERSTICK   POCT GLUCOSE FINGERSTICK   POCT GLUCOSE FINGERSTICK   CBC AND DIFFERENTIAL    Narrative:     The following orders were created for panel order CBC & Differential.  Procedure                               Abnormality         Status                     ---------                               -----------         ------                     CBC Auto Differential[[id number]]        Abnormal            Final result                 Please view results for these tests on the individual orders.       EKG:   No orders to display       Meds given in ED:   Medications   nitroglycerin (NITROSTAT) SL tablet 0.4 mg (has no administration in time range)   sodium chloride 0.9 % flush 10 mL (has no administration in time range)   sodium chloride 0.9 % flush 10 mL (has no administration in time range)   sodium chloride 0.9 % infusion 40 mL (has no administration in time range)   ondansetron (ZOFRAN) tablet 4 mg (has no administration in time range)     Or   ondansetron (ZOFRAN) injection 4 mg (has no administration in time range)   sodium chloride 0.9 % bolus 1,000 mL (1,000 mL Intravenous New Bag 12/8/22 1101)   insulin regular (humuLIN R,novoLIN R) injection 10 Units (10 Units Intravenous Given 12/8/22 1201)       Imaging results:  No radiology results for the last day    Ambulatory status:     Social issues:    Social History     Socioeconomic History    Marital status:    Tobacco Use    Smoking status: Never    Smokeless tobacco: Never   Substance and Sexual Activity    Alcohol use: No    Drug use: No       NIH Stroke Scale:         Barbara Titus RN  12/08/22 12:24 EST

## 2022-12-08 NOTE — H&P
[facility]   HISTORY AND PHYSICAL    Patient Name: Urban Teixeira  : 1940  MRN: [id number]  Primary Care Physician:  Carmela Sweet MD  Date of admission: 2022    Subjective   Subjective     Chief Complaint:   Chief Complaint   Patient presents with   • Hyperglycemia         HPI:    Urban Teixeira is a pleasant afebrile ambulatory 82 y.o.  male with a past medical history of coronary artery disease, hypertension, type 2 diabetes, hyperlipidemia, paroxysmal afib on Xarelto and GERD.    He presents emergency department [facility] today with complaint of hyperglycemia.  He is accompanied by his wife who reports that his medications have recently been changed from oral to insulin therapy.  Patient states he is been feeling okay but has had polyuria and polydipsia in the last few weeks.  Patient's wife states that patient's blood sugars ran high for quite some time but this morning it was over 600 which is when they decided to come to the emergency department.  Patient denies any nausea, vomiting, diarrhea or abdominal discomfort.  He has been feeling well recently without any febrile illnesses.  He is scheduled to follow with a new endocrinologist because he did not like his most recent one and has never met with a dietitian previously.    Review of Systems   All systems were reviewed and negative except for: Polyuria, polydipsia, hyperglycemia    Personal History     Past Medical History:   Diagnosis Date   • Arthritis    • Bell's palsy     rt side facial drooping   • BPH (benign prostatic hyperplasia)    • Coronary artery disease    • DDD (degenerative disc disease), cervical    • Diabetes mellitus (HCC)    • GERD (gastroesophageal reflux disease)    • GERD (gastroesophageal reflux disease)    • Hyperlipidemia    • Hypertension    • Myocardial infarction (HCC)      AND    • Neck pain        Past Surgical History:   Procedure Laterality Date   • ANTERIOR CERVICAL  DISCECTOMY W/ FUSION Left 2/24/2017    Procedure: ANTERIOR CERVICAL DISCECTOMY WITH FUSION WITH INSTRUMENTATION C4/5, C5/6;  Surgeon: Obed Youssef MD;  Location: [facility];  Service:    • CATARACT EXTRACTION Bilateral    • CHOLECYSTECTOMY     • CORONARY ARTERY BYPASS GRAFT      [facility] AT [facility] AND [facility]   • CORONARY STENT PLACEMENT     • CYSTOSCOPY LITHOLAPAXY BLADDER STONE EXTRACTION     • CYSTOSCOPY TRANSURETHRAL RESECTION OF PROSTATE     • HERNIA REPAIR     • KNEE ARTHROSCOPY         Family History: family history includes COPD in his sister; Glaucoma in his mother; Heart disease in his father and sister; Hypertension in his brother, mother, and sister; Kidney disease in his father; Stroke in his mother. Otherwise pertinent FHx was reviewed and not pertinent to current issue.    Social History:  reports that he has never smoked. He has never used smokeless tobacco. He reports that he does not drink alcohol and does not use drugs.    Home Medications:  Aspirin, Insulin Degludec, Insulin Pen Needle, Pharmacist Choice Lancets, PreserVision AREDS, acetaminophen, atorvastatin, carvedilol, insulin aspart, losartan, metFORMIN ER, rivaroxaban, and vitamin D    Allergies:  Allergies   Allergen Reactions   • Sulfa Antibiotics Rash and Hives       Objective   Objective     Vitals:   Temp:  [96.4 °F (35.8 °C)] 96.4 °F (35.8 °C)  Heart Rate:  [68-89] 68  Resp:  [16] 16  BP: (152)/(75-89) 152/75  Physical Exam    Constitutional: Awake, alert   Eyes: PERRLA, sclerae anicteric, no conjunctival injection   HENT: NCAT, mucous membranes moist   Neck: Supple, no thyromegaly, no lymphadenopathy, trachea midline   Respiratory: Clear to auscultation bilaterally, nonlabored respirations    Cardiovascular: RRR, no murmurs, rubs, or gallops, palpable pedal pulses bilaterally   Gastrointestinal: Positive bowel sounds, soft, nontender, nondistended   Musculoskeletal: No bilateral ankle edema, no clubbing or cyanosis to  extremities   Psychiatric: Appropriate affect, cooperative   Neurologic: Oriented x 3, strength symmetric in all extremities, Cranial Nerves grossly intact to confrontation, speech clear   Skin: No rashes     Result Review    Result Review:  I have personally reviewed the results from the time of this admission to 12/8/2022 12:29 EST and agree with these findings:  [x]  Laboratory list / accordion  []  Microbiology  [x]  Radiology  [x]  EKG/Telemetry   []  Cardiology/Vascular   []  Pathology  []  Old records  []  Other:  Most notable findings include: Creatinine 1.34, sodium 126, blood glucose 723, urinalysis positive greater than 100,000 glucose      Assessment & Plan   Assessment / Plan     Brief Patient Summary:  Urban Teixeira is a 82 y.o. male who is being evaluated for hypoglycemia.  Certainly this is secondary to recently being changed medication therapies, plan to administer parenteral insulin and IV hydration and start our sliding scale protocol on high.  He does not have any ketones in his urine and has no overt concerning signs for diabetic ketoacidosis.  Plan to administer a consistent carbohydrate diet with frequent Accu-Cheks and will repeat electrolytes in the morning to ensure that the pseudohyponatremia on his labs today is resolving in addition to his slight acute kidney injury.  Active Hospital Problems:  Active Hospital Problems    Diagnosis    • **Hyperglycemia      Plan:     Hyperglycemia/SASCHA  Consult to diabetic educator  1 L normal saline and 10 units insulin given in ED  High sliding scale protocol initiated  LR @ 100mL/hr  Check A1c  Consult PT    Pseudohyponatremia of hyperglycemia  Corrected sodium 136  Repeat CMP in AM    Atrial fibrillation  Telemetry  Continue Eliquis    Hypertension  Vitals every 4 hours per nursing  Continue home medications    History of coronary artery disease  Continue baby aspirin and Plavix    DVT prophylaxis:  Mechanical DVT prophylaxis orders are  present.    CODE STATUS:    Code Status (Patient has no pulse and is not breathing): CPR (Attempt to Resuscitate)  Medical Interventions (Patient has pulse or is breathing): Full Support    Admission Status:  I believe this patient meets observation status.    Electronically signed by SARY Kraft, 12/08/22, 12:29 PM EST.         I have worn appropriate PPE during this patient encounter, sanitized my hands both with entering and exiting patient's room.

## 2022-12-08 NOTE — CASE MANAGEMENT/SOCIAL WORK
Discharge Planning Assessment  [facility]     Patient Name: Urban Teixeira  MRN: [id number]  Today's Date: 12/8/2022    Admit Date: 12/8/2022    Plan: Pt plans to return home upon d/c with spouse to transport   Discharge Needs Assessment     Row Name 12/08/22 1850       Living Environment    People in Home spouse    Current Living Arrangements home    Primary Care Provided by self    Provides Primary Care For spouse    Family Caregiver if Needed spouse    Quality of Family Relationships helpful;involved;supportive    Able to Return to Prior Arrangements yes       Resource/Environmental Concerns    Resource/Environmental Concerns none       Transition Planning    Patient/Family Anticipates Transition to home with family    Patient/Family Anticipated Services at Transition none    Transportation Anticipated family or friend will provide       Discharge Needs Assessment    Equipment Currently Used at Home glucometer    Concerns to be Addressed denies needs/concerns at this time    Anticipated Changes Related to Illness none    Equipment Needed After Discharge none    Provided Post Acute Provider List? N/A    Provided Post Acute Provider Quality & Resource List? N/A    Current Discharge Risk chronically ill               Discharge Plan     Row Name 12/08/22 9915       Plan    Plan Pt plans to return home upon d/c with spouse to transport    Plan Comments Entered room, introduced self and explained role w/PPE in place on self and spouse; patient has removed mask; verified information on facesheet; permission given to speak infront of spouse- pt lives in a patio home at [facility] in independent living with spouse; is independent w/ADL's, still drives; RX are filled at [facility] on [address]; verified PCP- Pt presented to the ED w/elevated blood sugar; Pt uses a glucose machine at home daily. Pt denies any d/c plans at this time; plans to return home w/spouse to transport.              Continued Care and Services -  Admitted Since 12/8/2022    Coordination has not been started for this encounter.          Demographic Summary     Row Name 12/08/22 1849       General Information    Admission Type observation    Arrived From home    Required Notices Provided Observation Status Notice    Reason for Consult discharge planning    Preferred Language English       Contact Information    Permission Granted to Share Info With                Functional Status     Row Name 12/08/22 1849       Functional Status    Usual Activity Tolerance good    Current Activity Tolerance good       Assessment of Health Literacy    How often do you have someone help you read hospital materials? Never    How often do you have problems learning about your medical condition because of difficulty understanding written information? Never    How often do you have a problem understanding what is told to you about your medical condition? Occasionally    How confident are you filling out medical forms by yourself? Extremely    Health Literacy Good       Functional Status, IADL    Medications independent    Meal Preparation independent    Housekeeping independent    Laundry independent    Shopping independent       Mental Status    General Appearance WDL WDL       Mental Status Summary    Recent Changes in Mental Status/Cognitive Functioning no changes       Employment/    Employment Status retired               Psychosocial    No documentation.                Abuse/Neglect    No documentation.                Legal    No documentation.                Substance Abuse    No documentation.                Patient Forms    No documentation.                   Oneyda Ricketts RN

## 2022-12-08 NOTE — PLAN OF CARE
Goal Outcome Evaluation:   Pt admitted for hyperglycemia. Last blood sugar was 500. Pt and wife state their experience with last diabetes doctor was not productive. Pt feels he was not properly educated before being sent home. Pt dexcom ran out of patches and doctor wouldn't return his calls so he was instructed to come here. Wife at bedside. LR at 100. VSS. Pt is alert and oriented. Right side facial droop noted. Pt has hx of bells palsy

## 2022-12-09 ENCOUNTER — READMISSION MANAGEMENT (OUTPATIENT)
Dept: CALL CENTER | Facility: HOSPITAL | Age: 82
End: 2022-12-09

## 2022-12-09 VITALS
SYSTOLIC BLOOD PRESSURE: 138 MMHG | BODY MASS INDEX: 21.05 KG/M2 | HEART RATE: 81 BPM | RESPIRATION RATE: 16 BRPM | DIASTOLIC BLOOD PRESSURE: 73 MMHG | OXYGEN SATURATION: 96 % | WEIGHT: 147 LBS | HEIGHT: 70 IN | TEMPERATURE: 97.8 F

## 2022-12-09 LAB
ALBUMIN SERPL-MCNC: 3.3 G/DL (ref 3.5–5.2)
ALBUMIN/GLOB SERPL: 1.7 G/DL
ALP SERPL-CCNC: 65 U/L (ref 39–117)
ALT SERPL W P-5'-P-CCNC: 16 U/L (ref 1–41)
ANION GAP SERPL CALCULATED.3IONS-SCNC: 8.6 MMOL/L (ref 5–15)
AST SERPL-CCNC: 16 U/L (ref 1–40)
BILIRUB SERPL-MCNC: 0.7 MG/DL (ref 0–1.2)
BUN SERPL-MCNC: 22 MG/DL (ref 8–23)
BUN/CREAT SERPL: 25 (ref 7–25)
CALCIUM SPEC-SCNC: 8 MG/DL (ref 8.6–10.5)
CHLORIDE SERPL-SCNC: 104 MMOL/L (ref 98–107)
CO2 SERPL-SCNC: 23.4 MMOL/L (ref 22–29)
CREAT SERPL-MCNC: 0.88 MG/DL (ref 0.76–1.27)
DEPRECATED RDW RBC AUTO: 42.9 FL (ref 37–54)
EGFRCR SERPLBLD CKD-EPI 2021: 86.4 ML/MIN/1.73
ERYTHROCYTE [DISTWIDTH] IN BLOOD BY AUTOMATED COUNT: 12.6 % (ref 12.3–15.4)
GLOBULIN UR ELPH-MCNC: 2 GM/DL
GLUCOSE BLDC GLUCOMTR-MCNC: 272 MG/DL (ref 70–130)
GLUCOSE BLDC GLUCOMTR-MCNC: 323 MG/DL (ref 70–130)
GLUCOSE BLDC GLUCOMTR-MCNC: 381 MG/DL (ref 70–130)
GLUCOSE SERPL-MCNC: 244 MG/DL (ref 65–99)
HCT VFR BLD AUTO: 38.9 % (ref 37.5–51)
HGB BLD-MCNC: 12.9 G/DL (ref 13–17.7)
MCH RBC QN AUTO: 30.9 PG (ref 26.6–33)
MCHC RBC AUTO-ENTMCNC: 33.2 G/DL (ref 31.5–35.7)
MCV RBC AUTO: 93.1 FL (ref 79–97)
PLATELET # BLD AUTO: 121 10*3/MM3 (ref 140–450)
PMV BLD AUTO: 9.9 FL (ref 6–12)
POTASSIUM SERPL-SCNC: 3.8 MMOL/L (ref 3.5–5.2)
PROT SERPL-MCNC: 5.3 G/DL (ref 6–8.5)
RBC # BLD AUTO: 4.18 10*6/MM3 (ref 4.14–5.8)
SODIUM SERPL-SCNC: 136 MMOL/L (ref 136–145)
WBC NRBC COR # BLD: 5.11 10*3/MM3 (ref 3.4–10.8)

## 2022-12-09 PROCEDURE — 63710000001 INSULIN LISPRO (HUMAN) PER 5 UNITS: Performed by: NURSE PRACTITIONER

## 2022-12-09 PROCEDURE — G0378 HOSPITAL OBSERVATION PER HR: HCPCS

## 2022-12-09 PROCEDURE — 85027 COMPLETE CBC AUTOMATED: CPT | Performed by: NURSE PRACTITIONER

## 2022-12-09 PROCEDURE — 82962 GLUCOSE BLOOD TEST: CPT

## 2022-12-09 PROCEDURE — 97530 THERAPEUTIC ACTIVITIES: CPT

## 2022-12-09 PROCEDURE — 96361 HYDRATE IV INFUSION ADD-ON: CPT

## 2022-12-09 PROCEDURE — 97161 PT EVAL LOW COMPLEX 20 MIN: CPT

## 2022-12-09 PROCEDURE — 80053 COMPREHEN METABOLIC PANEL: CPT | Performed by: NURSE PRACTITIONER

## 2022-12-09 RX ORDER — BLOOD PRESSURE TEST KIT
KIT MISCELLANEOUS
Qty: 100 EACH | Refills: 3 | Status: SHIPPED | OUTPATIENT
Start: 2022-12-09

## 2022-12-09 RX ORDER — LANCETS 30 GAUGE
EACH MISCELLANEOUS
Qty: 100 EACH | Refills: 0 | Status: SHIPPED | OUTPATIENT
Start: 2022-12-09 | End: 2022-12-09 | Stop reason: SDUPTHER

## 2022-12-09 RX ORDER — LANCETS 30 GAUGE
EACH MISCELLANEOUS
Qty: 100 EACH | Refills: 0 | Status: SHIPPED | OUTPATIENT
Start: 2022-12-09

## 2022-12-09 RX ORDER — BLOOD-GLUCOSE METER
KIT MISCELLANEOUS
Qty: 1 EACH | Refills: 0 | Status: SHIPPED | OUTPATIENT
Start: 2022-12-09 | End: 2022-12-09 | Stop reason: SDUPTHER

## 2022-12-09 RX ORDER — BLOOD PRESSURE TEST KIT
KIT MISCELLANEOUS
Qty: 100 EACH | Refills: 3 | Status: SHIPPED | OUTPATIENT
Start: 2022-12-09 | End: 2022-12-09 | Stop reason: SDUPTHER

## 2022-12-09 RX ORDER — BLOOD-GLUCOSE METER
KIT MISCELLANEOUS
Qty: 1 EACH | Refills: 0 | Status: SHIPPED | OUTPATIENT
Start: 2022-12-09

## 2022-12-09 RX ADMIN — SODIUM CHLORIDE, POTASSIUM CHLORIDE, SODIUM LACTATE AND CALCIUM CHLORIDE 100 ML/HR: 600; 310; 30; 20 INJECTION, SOLUTION INTRAVENOUS at 03:21

## 2022-12-09 RX ADMIN — PANTOPRAZOLE SODIUM 40 MG: 40 TABLET, DELAYED RELEASE ORAL at 09:26

## 2022-12-09 RX ADMIN — INSULIN LISPRO 12 UNITS: 100 INJECTION, SOLUTION INTRAVENOUS; SUBCUTANEOUS at 09:28

## 2022-12-09 RX ADMIN — INSULIN GLARGINE-YFGN 60 UNITS: 100 INJECTION, SOLUTION SUBCUTANEOUS at 12:07

## 2022-12-09 RX ADMIN — CARVEDILOL 12.5 MG: 3.12 TABLET, FILM COATED ORAL at 09:27

## 2022-12-09 RX ADMIN — LOSARTAN POTASSIUM 50 MG: 50 TABLET, FILM COATED ORAL at 09:27

## 2022-12-09 RX ADMIN — CLOPIDOGREL 75 MG: 75 TABLET, FILM COATED ORAL at 09:27

## 2022-12-09 RX ADMIN — ASPIRIN 81 MG: 81 TABLET, CHEWABLE ORAL at 09:27

## 2022-12-09 RX ADMIN — INSULIN LISPRO 16 UNITS: 100 INJECTION, SOLUTION INTRAVENOUS; SUBCUTANEOUS at 12:49

## 2022-12-09 RX ADMIN — BACLOFEN 5 MG: 10 TABLET ORAL at 09:27

## 2022-12-09 RX ADMIN — Medication 10 ML: at 09:29

## 2022-12-09 NOTE — PLAN OF CARE
Goal Outcome Evaluation:              Outcome Evaluation: PT is 81 yo male presenting w hyperglycemia.  Pt was independent w mobility prior to admission.  At time of PT eval pt was independent w all bed mobility, transfers and ambulation of 120'.  Due to pt's above noted independence pt is in no need of skilled PT at this time.

## 2022-12-09 NOTE — PLAN OF CARE
Problem: Adult Inpatient Plan of Care  Goal: Plan of Care Review  Outcome: Met  Goal: Patient-Specific Goal (Individualized)  Outcome: Met  Goal: Absence of Hospital-Acquired Illness or Injury  Outcome: Met  Intervention: Identify and Manage Fall Risk  Recent Flowsheet Documentation  Taken 12/9/2022 1000 by Abhishek Merlos RN  Safety Promotion/Fall Prevention:   activity supervised   nonskid shoes/slippers when out of bed   safety round/check completed  Taken 12/9/2022 0709 by Abhishek Merlos RN  Safety Promotion/Fall Prevention:   activity supervised   nonskid shoes/slippers when out of bed   safety round/check completed   clutter free environment maintained   fall prevention program maintained  Intervention: Prevent Skin Injury  Recent Flowsheet Documentation  Taken 12/9/2022 1000 by Abhishek Merlos RN  Body Position: position changed independently  Taken 12/9/2022 0709 by Abhishek Merlos RN  Body Position: position changed independently  Intervention: Prevent and Manage VTE (Venous Thromboembolism) Risk  Recent Flowsheet Documentation  Taken 12/9/2022 1000 by Abhishek Merlos RN  Activity Management: activity adjusted per tolerance  Taken 12/9/2022 0709 by Abhishek Merlos RN  Activity Management: activity adjusted per tolerance  Taken 12/9/2022 0704 by Abhishek Merlos RN  Range of Motion: active ROM (range of motion) encouraged  Intervention: Prevent Infection  Recent Flowsheet Documentation  Taken 12/9/2022 1000 by Abhishek Merlos RN  Infection Prevention:   rest/sleep promoted   single patient room provided  Taken 12/9/2022 0709 by Abhishek Merlos RN  Infection Prevention:   single patient room provided   rest/sleep promoted  Goal: Optimal Comfort and Wellbeing  Outcome: Met  Intervention: Provide Person-Centered Care  Recent Flowsheet Documentation  Taken 12/9/2022 0704 by Abhishek Merlos RN  Trust Relationship/Rapport:   care explained   emotional support provided   questions answered   reassurance provided   thoughts/feelings  acknowledged  Goal: Readiness for Transition of Care  Outcome: Met     Problem: Fall Injury Risk  Goal: Absence of Fall and Fall-Related Injury  Outcome: Met  Intervention: Identify and Manage Contributors  Recent Flowsheet Documentation  Taken 12/9/2022 1000 by Abhishek Merlos, RN  Medication Review/Management: medications reviewed  Taken 12/9/2022 0709 by Abhishek Merlos, RN  Medication Review/Management: medications reviewed  Intervention: Promote Injury-Free Environment  Recent Flowsheet Documentation  Taken 12/9/2022 1000 by Abhishek Merlos, RN  Safety Promotion/Fall Prevention:   activity supervised   nonskid shoes/slippers when out of bed   safety round/check completed  Taken 12/9/2022 0709 by Abhishek Merlso, RN  Safety Promotion/Fall Prevention:   activity supervised   nonskid shoes/slippers when out of bed   safety round/check completed   clutter free environment maintained   fall prevention program maintained   Goal Outcome Evaluation:

## 2022-12-09 NOTE — OUTREACH NOTE
Prep Survey    Flowsheet Row Responses   [facility] facility patient discharged from? [facility]   Is LACE score < 7 ? Yes   Emergency Room discharge w/ pulse ox? No   Eligibility [facility]   Date of Admission 12/08/22   Date of Discharge 12/09/22   Discharge Disposition Home or Self Care   Discharge diagnosis Hyperglycemia, SASCHA, A-fib   Does the patient have one of the following disease processes/diagnoses(primary or secondary)? Other   Does the patient have Home health ordered? No   Is there a DME ordered? No   Prep survey completed? Yes          RAMAN Locke Registered Nurse

## 2022-12-09 NOTE — DISCHARGE INSTRUCTIONS
You have been provided glucose test strips, lancets, monitoring kit, and alcohol swabs for glucose monitoring at home.  You will need to monitor and record your blood sugars 4 times daily, once before breakfast, before lunch, before dinner, and at nighttime.  Continue taking your home insulin as prescribed to you, he will take your Tresiba (long-acting insulin) daily and the NovoLog FlexPen take 10 units before each meal.  Do not take the mealtime insulin if you do not plan to eat a meal.  Keep your follow-up appointment with endocrinology on 12/21/2022.  Call and make an appointment with your primary care doctor in 1 to 2 weeks.  I have placed your outpatient referral for continued diabetes education, you should receive a call to set up further appointments.

## 2022-12-09 NOTE — THERAPY EVALUATION
Patient Name: Urban Teixeira  : 1940    MRN: [id number]                              Today's Date: 2022       Admit Date: 2022    Visit Dx:     ICD-10-CM ICD-9-CM   1. Hyperglycemia due to diabetes mellitus ([facility])  E11.65 250.02     Patient Active Problem List   Diagnosis   • Bladder retention   • Hernia, inguinal, right   • Cervical disc disorder with radiculopathy of mid-cervical region   • Degeneration of intervertebral disc of mid-cervical region   • Herniated cervical disc   • Coronary artery disease   • Hypertension   • Type 2 diabetes mellitus with hyperglycemia ([facility])   • Myocardial infarction (old)   • Macular degeneration   • Hyperlipidemia   • S/P CABG x 3   • S/P PTCA (percutaneous transluminal coronary angioplasty)   • Paroxysmal atrial fibrillation ([facility])   • Bell palsy   • NSTEMI (non-ST elevated myocardial infarction) ([facility])   • S/P drug eluting coronary stent placement   • Clinical diagnosis of COVID-19   • Hyperglycemia     Past Medical History:   Diagnosis Date   • Arthritis    • Atrial fibrillation ([facility])    • Bell's palsy     rt side facial drooping   • BPH (benign prostatic hyperplasia)    • Coronary artery disease    • DDD (degenerative disc disease), cervical    • Diabetes mellitus (HCC)    • GERD (gastroesophageal reflux disease)    • GERD (gastroesophageal reflux disease)    • Hyperlipidemia    • Hypertension    • Myocardial infarction ([facility])      AND    • Neck pain      Past Surgical History:   Procedure Laterality Date   • ANTERIOR CERVICAL DISCECTOMY W/ FUSION Left 2017    Procedure: ANTERIOR CERVICAL DISCECTOMY WITH FUSION WITH INSTRUMENTATION C4/5, C5/6;  Surgeon: Obed Youssef MD;  Location: [facility];  Service:    • CATARACT EXTRACTION Bilateral    • CHOLECYSTECTOMY     • CORONARY ARTERY BYPASS GRAFT       AT [facility] AND  [facility]   • CORONARY STENT PLACEMENT     • CYSTOSCOPY LITHOLAPAXY BLADDER STONE EXTRACTION     • CYSTOSCOPY TRANSURETHRAL  RESECTION OF PROSTATE     • HERNIA REPAIR     • KNEE ARTHROSCOPY        General Information     Row Name 12/09/22 0925          Physical Therapy Time and Intention    Document Type evaluation  -MD     Mode of Treatment physical therapy  -MD     Row Name 12/09/22 0925          General Information    Patient Profile Reviewed yes  -MD     Prior Level of Function independent:  -MD     Existing Precautions/Restrictions no known precautions/restrictions  -MD     Barriers to Rehab none identified  -MD     Row Name 12/09/22 0925          Living Environment    People in Home spouse  assisted living facility  -MD     Row Name 12/09/22 0925          Home Main Entrance    Number of Stairs, Main Entrance none  -MD     Row Name 12/09/22 0925          Stairs Within Home, Primary    Number of Stairs, Within Home, Primary none  -MD     Row Name 12/09/22 0925          Cognition    Orientation Status (Cognition) oriented x 3  -MD           User Key  (r) = Recorded By, (t) = Taken By, (c) = Cosigned By    Initials Name Provider Type    Oneyda Patel, PT Physical Therapist               Mobility     Row Name 12/09/22 0925          Bed Mobility    Bed Mobility supine-sit-supine  -MD     Supine-Sit-Supine [place] (Bed Mobility) independent  -MD     Row Name 12/09/22 0925          Sit-Stand Transfer    Sit-Stand [place] (Transfers) independent  -MD     Row Name 12/09/22 0925          Gait/Stairs (Locomotion)    [place] Level (Gait) independent  -MD     Distance in Feet (Gait) 120'  -MD     Deviations/Abnormal Patterns (Gait) --  no gait deviations present at this time  -MD           User Key  (r) = Recorded By, (t) = Taken By, (c) = Cosigned By    Initials Name Provider Type    Oneyda Patel, PT Physical Therapist               Obj/Interventions     Row Name 12/09/22 0925          Range of Motion Comprehensive    General Range of Motion no range of motion deficits identified  -MD     Row Name 12/09/22 0925          Strength  Comprehensive (MMT)    General Manual Muscle Testing (MMT) Assessment no strength deficits identified  -MD           User Key  (r) = Recorded By, (t) = Taken By, (c) = Cosigned By    Initials Name Provider Type    Oneyda Patel, PT Physical Therapist               Goals/Plan    No documentation.                Clinical Impression     Row Name 12/09/22 0925          Pain    Pretreatment Pain Rating 0/10 - no pain  -MD     Row Name 12/09/22 0925          Plan of Care Review    Outcome Evaluation PT is 83 yo male presenting w hyperglycemia.  Pt was independent w mobility prior to admission.  At time of PT eval pt was independent w all bed mobility, transfers and ambulation of 120'.  Due to pt's above noted independence pt is in no need of skilled PT at this time.  -MD     Row Name 12/09/22 0925          Therapy Assessment/Plan (PT)    Criteria for Skilled Interventions Met (PT) no;does not meet criteria for skilled intervention  -MD     Therapy Frequency (PT) evaluation only  -MD     Row Name 12/09/22 0925          Positioning and Restraints    Pre-Treatment Position in bed  -MD     Post Treatment Position bed  -MD     In Bed supine;call light within reach  -MD           User Key  (r) = Recorded By, (t) = Taken By, (c) = Cosigned By    Initials Name Provider Type    Oneyda Patel, PT Physical Therapist               Outcome Measures     Row Name 12/09/22 0927          How much help from another person do you currently need...    Turning from your back to your side while in flat bed without using bedrails? 4  -MD     Moving from lying on back to sitting on the side of a flat bed without bedrails? 4  -MD     Moving to and from a bed to a chair (including a wheelchair)? 4  -MD     Standing up from a chair using your arms (e.g., wheelchair, bedside chair)? 4  -MD     Climbing 3-5 steps with a railing? 3  -MD     To walk in hospital room? 4  -MD     AM-PAC 6 Clicks Score (PT) 23  -MD     Highest level of mobility 7 -->  Walked 25 feet or more  -MD     Row Name 12/09/22 0927          Functional Assessment    Outcome Measure Options AM-PAC 6 Clicks Basic Mobility (PT)  -MD           User Key  (r) = Recorded By, (t) = Taken By, (c) = Cosigned By    Initials Name Provider Type    Oneyda Patel PT Physical Therapist                               PT Recommendation and Plan     Outcome Evaluation: PT is 81 yo male presenting w hyperglycemia.  Pt was independent w mobility prior to admission.  At time of PT eval pt was independent w all bed mobility, transfers and ambulation of 120'.  Due to pt's above noted independence pt is in no need of skilled PT at this time.     Time Calculation:    PT Charges     Row Name 12/09/22 0923             Time Calculation    Start Time 0913  -MD      Stop Time 0923  -MD      Time Calculation (min) 10 min  -MD      PT Received On 12/09/22  -MD            User Key  (r) = Recorded By, (t) = Taken By, (c) = Cosigned By    Initials Name Provider Type    Oneyda Patel PT Physical Therapist              Therapy Charges for Today     Code Description Service Date Service Provider Modifiers Qty    [id number]  PT EVAL LOW COMPLEXITY 1 12/9/2022 Oneyda Dominguez, PT GP 1    [id number]  PT THERAPEUTIC ACT EA 15 MIN 12/9/2022 Oneyda Dominguez, PT GP 1        Patient was not wearing a face mask during this therapy encounter. Therapist used appropriate personal protective equipment including mask and gloves.  Mask used was standard procedure mask. Appropriate PPE was worn during the entire therapy session. Hand hygiene was completed before and after therapy session. Patient is not in enhanced droplet precautions.       PT G-Codes  Outcome Measure Options: AM-PAC 6 Clicks Basic Mobility (PT)  AM-PAC 6 Clicks Score (PT): 23  PT Discharge Summary  Anticipated Discharge Disposition (PT): home with assist, assisted living    Oneyda Dominguez PT  12/9/2022

## 2022-12-09 NOTE — PLAN OF CARE
Goal Outcome Evaluation:   Blood glucose monitored as ordered. Tolerating IVF as ordered. Pt noted with increased confusion through the night, needing frequent redirection on where he was and what time of night it was. Fall precautions maintained, pt needing frequent reeducation regarding fall precuations/risks. Ambulates with stand by assist, gait/balance steady.

## 2022-12-09 NOTE — PROGRESS NOTES
.  ED OBSERVATION PROGRESS/DISCHARGE SUMMARY    Date of Admission: 12/8/2022   LOS: 0 days   PCP: Carmela Sweet MD    Subjective   No acute event overnight.  Patient denies any complaints at this time.  Denies any chest pain or shortness of breath.  Denies any headache or lightheadedness.  Denies abdominal pain and nausea.  Denies diarrhea.  Denies fevers and chills.  Patient and wife at bedside states patient was not given great education from his previous endocrinologist regarding the use of his insulin therefore he was not taking it as he has been directed to take it now.  He states that he feels he has a lot more confidence and understanding and monitoring his blood sugar and taking his insulin at this time.  He states he is interested in further diabetes education follow-up.    Hospital Outcome:  82-year-old male mated to the observation unit with hyperglycemia.  Initial glucose in ED was 723 and patient has received 1 L of saline and 10 units of regular insulin and glucose level has markedly improved since then.  A1c noted at 13. BMP this morning shows glucose 244 and sodium 136.  Diabetes educator spoke with the patient regarding glucose monitoring and insulin administration.  I spoke with the patient personally verifying home instructions regarding glucose monitoring and insulin administration.  Patient endorses understanding.  Prescriptions sent for lancets, glucose strips, monitoring kit and alcohol swabs.  Patient has an appointment with a new endocrinologist with [facility] on 12/21/2022, I have advised him to keep this appointment.  Have also advised him to get an appointment with his PCP Dr. Sweet for 1 week for repeat lab work and review of his home glucose monitoring.  Patient is in agreement with the plan for discharge at this time.    ROS:  General: no fevers, chills  Respiratory: no cough, dyspnea  Cardiovascular: no chest pain, palpitations  Abdomen: No abdominal pain, nausea, vomiting, or  diarrhea  Neurologic: No focal weakness    Objective   Physical Exam:  I have reviewed the vital signs.  Temp:  [96.4 °F (35.8 °C)-98.3 °F (36.8 °C)] 98.3 °F (36.8 °C)  Heart Rate:  [65-89] 77  Resp:  [16] 16  BP: (102-152)/(61-89) 108/81  General Appearance:  82-year-old male, well-nourished, no acute distress on room air  Head:    Normocephalic, atraumatic  Eyes:    Sclerae anicteric  Neck:   Supple, no mass  Lungs: Clear to auscultation bilaterally, respirations unlabored  Heart: Regular rate and rhythm, S1 and S2 normal, no murmur, rub or gallop  Abdomen:  Soft, non-tender, bowel sounds active, nondistended  Extremities: No clubbing, cyanosis, or edema to lower extremities  Pulses:  2+ and symmetric in distal lower extremities  Skin: No rashes   Neurologic: Oriented x3, Normal strength to extremities    Results Review:    I have reviewed the labs, radiology results and diagnostic studies.    Results from last 7 days   Lab Units 12/08/22  1059   WBC 10*3/mm3 4.56   HEMOGLOBIN g/dL 13.3   HEMATOCRIT % 41.2   PLATELETS 10*3/mm3 134*     Results from last 7 days   Lab Units 12/08/22  1511 12/08/22  1059   SODIUM mmol/L 139 126*   POTASSIUM mmol/L 4.3 4.8   CHLORIDE mmol/L 100 89*   CO2 mmol/L 28.5 28.0   BUN mg/dL  --  21   CREATININE mg/dL  --  1.34*   CALCIUM mg/dL  --  9.2   BILIRUBIN mg/dL  --  0.9   ALK PHOS U/L  --  98   ALT (SGPT) U/L  --  14   AST (SGOT) U/L  --  14   GLUCOSE mg/dL  --  723*     Imaging Results (Last 24 Hours)     ** No results found for the last 24 hours. **          I have reviewed the medications.  ---------------------------------------------------------------------------------------------  Assessment & Plan   Assessment/Problem List    Hyperglycemia      Plan:    Hyperglycemia  -A1c 13.0  -High sliding scale protocol initiated  -Continued patient's home basal insulin this morning  -Diabetes educator saw and evaluated the patient, see their note for further detail  -Ambulatory referral  placed for continued outpatient diabetes education  -Patient to follow-up with endocrinology on 12/21/2022 and with PCP in 1 week  -Patient states he endorses understanding and has more confidence in his home management of his diabetes.     Pseudohyponatremia of hyperglycemia  -Corrected sodium 136  -Repeat BMP this morning reveals a sodium of 136     Acute kidney injury  -Creatinine 1.34, improved to 0.88 this morning  -Patient received IV fluids overnight     Atrial fibrillation  -Continue home medications as prescribed     Hypertension  -Continue home medications     History of coronary artery disease  -Continue baby aspirin and Plavix    Disposition: Home    Follow-up after Discharge: Chronology, PCP    This note will serve as discharge summary    I wore an face mask, eye protection, and gloves during this patient encounter. Patient also wearing a surgical mask. Hand hygeine performed before and after seeing the patient.    Bernie Narvaez PA-C 12/09/22 12:02 EST

## 2022-12-11 ENCOUNTER — TRANSITIONAL CARE MANAGEMENT TELEPHONE ENCOUNTER (OUTPATIENT)
Dept: CALL CENTER | Facility: HOSPITAL | Age: 82
End: 2022-12-11

## 2022-12-11 NOTE — OUTREACH NOTE
Call Center TCM Note    Flowsheet Row Responses   [facility] patient discharged from? [place]   Does the patient have one of the following disease processes/diagnoses(primary or secondary)? Other   TCM attempt successful? Yes   Call start time 1205   Call end time 1216   Discharge diagnosis Hyperglycemia, SASCHA, A-fib   Person spoke with today (if not patient) and relationship Briseida-spouse   Meds reviewed with patient/caregiver? Yes   Is the patient having any side effects they believe may be caused by any medication additions or changes? No   Does the patient have all medications ordered at discharge? Yes   Is the patient taking all medications as directed (includes completed medication regime)? Yes   Does the patient have an appointment with their PCP within 7 days of discharge? No   Nursing Interventions Patient declined scheduling/rescheduling appointment at this time   Has home health visited the patient within 72 hours of discharge? N/A   Psychosocial issues? No   Comments Spouse reports pts BS flucutates alot- 80's-200's   Did the patient receive a copy of their discharge instructions? Yes   Nursing interventions Reviewed instructions with patient   What is the patient's perception of their health status since discharge? Improving   Is the patient/caregiver able to teach back the hierarchy of who to call/visit for symptoms/problems? PCP, Specialist, Home health nurse, Urgent Care, ED, 911 Yes   TCM call completed? Yes   Call end time 1216   Would this patient benefit from a Referral to Amb Social Work? No   Is the patient interested in additional calls from an ambulatory ?  NOTE:  applies to high risk patients requiring additional follow-up. No          Kendra Oliver RN    12/11/2022, 12:16 EST

## 2022-12-12 LAB — GLUCOSE BLDC GLUCOMTR-MCNC: >599 MG/DL (ref 70–130)

## 2022-12-22 ENCOUNTER — OFFICE VISIT (OUTPATIENT)
Dept: INTERNAL MEDICINE | Facility: CLINIC | Age: 82
End: 2022-12-22

## 2022-12-22 VITALS
BODY MASS INDEX: 20.97 KG/M2 | WEIGHT: 146.5 LBS | SYSTOLIC BLOOD PRESSURE: 136 MMHG | HEART RATE: 55 BPM | DIASTOLIC BLOOD PRESSURE: 80 MMHG | HEIGHT: 70 IN | OXYGEN SATURATION: 98 % | TEMPERATURE: 97.3 F

## 2022-12-22 DIAGNOSIS — Z09 HOSPITAL DISCHARGE FOLLOW-UP: Primary | ICD-10-CM

## 2022-12-22 DIAGNOSIS — E11.65 TYPE 2 DIABETES MELLITUS WITH HYPERGLYCEMIA, WITHOUT LONG-TERM CURRENT USE OF INSULIN: ICD-10-CM

## 2022-12-22 PROBLEM — E11.9: Status: RESOLVED | Noted: 2019-01-08 | Resolved: 2022-12-22

## 2022-12-22 PROCEDURE — 99495 TRANSJ CARE MGMT MOD F2F 14D: CPT | Performed by: NURSE PRACTITIONER

## 2022-12-22 PROCEDURE — 1111F DSCHRG MED/CURRENT MED MERGE: CPT | Performed by: NURSE PRACTITIONER

## 2022-12-22 NOTE — PROGRESS NOTES
Cece Teixeira is a 82 y.o. male.     Chief Complaint   Patient presents with   • Hospital Follow Up Visit     Dm         History of Present Illness   He is here today for hospital follow-up.  [facility] TCM note reviewed by me today.  He presented to [facility] ER on 12/8 with complaints of hyperglycemia after recent change in diabetic medications to insulin.  He has been complaining of polyuria and polydipsia over the past few weeks prior to arrival.  His blood sugar was over 600 which prompted them to come to the ER.  He was given IV fluids and 10 units of insulin in the ER with sliding scale protocol.  Urine negative for ketones.  His sodium was slightly low and had a mild acute kidney injury that improved with correction. He was admitted for further monitoring. Glucose was elevated between 200-300 during hospitalization and A1c was elevated at 13%.  He was discharged from the hospital on 12/9 to follow-up with PCP and endocrinology outpatient.    He is feeling okay today.  He is accompanied by his wife.  He was seen by [facility] endocrinology, Dr. Hernandez yesterday with repeat lab work.  He is currently on Tresiba 60 units every morning.  He is on sliding scale NovoLog for mealtime replacement. He currently has the Dexcom in place, but does not think it is working as it is continuously reading high. His blood sugar has been running around 250 fasting. He has not been checking his blood sugar before meals.  He denies any episodes of hypoglycemia since discharge.  He has currently been taking Novolog 10 units with meals. He is unsure of how to read his sliding scale insulin chart. He notes polyphagia, polyuria and polydipsia.  He has been trying to monitor carbohydrate intake and focus on protein intake.  He is up-to-date with diabetic eye exam and completed diabetic foot exam with endocrinology yesterday.  He is planning to see endocrinology back next week.    The following portions of the patient's history  were reviewed and updated as appropriate: allergies, current medications, past family history, past medical history, past social history, past surgical history and problem list.    Review of Systems   Constitutional: Negative for chills, fatigue and fever.   Respiratory: Negative for cough, chest tightness, shortness of breath and wheezing.    Cardiovascular: Negative for chest pain, palpitations and leg swelling.   Endocrine: Positive for polydipsia, polyphagia and polyuria.   Psychiatric/Behavioral: Negative for suicidal ideas and depressed mood. The patient is not nervous/anxious.        Objective   Physical Exam  Constitutional:       Appearance: He is well-developed.   Neck:      Thyroid: No thyroid mass, thyromegaly or thyroid tenderness.      Vascular: No carotid bruit.      Trachea: Trachea normal.   Cardiovascular:      Rate and Rhythm: Normal rate. Rhythm irregularly irregular.      Chest Wall: PMI is not displaced.      Pulses:           Radial pulses are 2+ on the right side and 2+ on the left side.        Dorsalis pedis pulses are 2+ on the right side and 2+ on the left side.        Posterior tibial pulses are 2+ on the right side and 2+ on the left side.      Heart sounds: S1 normal and S2 normal. Murmur heard.    Systolic murmur is present with a grade of 1/6.  Pulmonary:      Effort: Pulmonary effort is normal.      Breath sounds: Normal breath sounds.   Musculoskeletal:      Right lower leg: No edema.      Left lower leg: No edema.   Lymphadenopathy:      Head:      Right side of head: No submental, submandibular, tonsillar or occipital adenopathy.      Left side of head: No submental, submandibular, tonsillar or occipital adenopathy.      Cervical: No cervical adenopathy.   Skin:     General: Skin is warm and dry.      Capillary Refill: Capillary refill takes less than 2 seconds.      Nails: There is no clubbing.   Neurological:      Mental Status: He is alert and oriented to person, place, and  time.   Psychiatric:         Attention and Perception: Attention normal.         Mood and Affect: Mood and affect normal.         Speech: Speech normal.         Behavior: Behavior normal.         Thought Content: Thought content normal.         Cognition and Memory: Cognition normal.         Vitals:    12/22/22 1101   BP: 136/80   Pulse: 55   Temp: 97.3 °F (36.3 °C)   SpO2: 98%      Body mass index is 21.02 kg/m².    Assessment & Plan   Diagnoses and all orders for this visit:    1. Hospital discharge follow-up (Primary)    2. Type 2 diabetes mellitus with hyperglycemia, without long-term current use of insulin (HCC)      1. Hospital discharge f/u for Type 2 diabetes- POC glucose elevated at 382 today in office. His Dexcom CGM is not currently working. Recommend that he monitor glucose with finger sticks in the morning, fasting and before meals. Reviewed sliding scale insulin chart with patient and wife today. Discussed with him that based on his sliding scale, he should treat with 15 units of Novolog for his current blood sugar before eating lunch today. Encouraged him to continue to monitor carb intake and focus on adequate dietary intake of protein and hydrate well with fluids.  Continue Tresiba 60 units every morning.  Follow-up with endocrinology as scheduled next week.

## 2023-01-26 RX ORDER — CARVEDILOL 12.5 MG/1
12.5 TABLET ORAL 2 TIMES DAILY WITH MEALS
Qty: 180 TABLET | Refills: 1 | Status: SHIPPED | OUTPATIENT
Start: 2023-01-26

## 2023-01-27 RX ORDER — ATORVASTATIN CALCIUM 80 MG/1
80 TABLET, FILM COATED ORAL NIGHTLY
Qty: 90 TABLET | Refills: 1 | Status: SHIPPED | OUTPATIENT
Start: 2023-01-27

## 2023-02-27 ENCOUNTER — TELEPHONE (OUTPATIENT)
Dept: ENDOCRINOLOGY | Age: 83
End: 2023-02-27

## 2023-04-27 ENCOUNTER — TELEPHONE (OUTPATIENT)
Dept: INTERNAL MEDICINE | Facility: CLINIC | Age: 83
End: 2023-04-27
Payer: MEDICARE

## 2023-05-05 ENCOUNTER — OFFICE VISIT (OUTPATIENT)
Dept: INTERNAL MEDICINE | Facility: CLINIC | Age: 83
End: 2023-05-05
Payer: MEDICARE

## 2023-05-05 ENCOUNTER — LAB (OUTPATIENT)
Dept: LAB | Facility: HOSPITAL | Age: 83
End: 2023-05-05
Payer: MEDICARE

## 2023-05-05 VITALS
OXYGEN SATURATION: 99 % | DIASTOLIC BLOOD PRESSURE: 60 MMHG | WEIGHT: 169.9 LBS | HEIGHT: 70 IN | SYSTOLIC BLOOD PRESSURE: 124 MMHG | HEART RATE: 60 BPM | BODY MASS INDEX: 24.32 KG/M2 | TEMPERATURE: 97.5 F

## 2023-05-05 DIAGNOSIS — D64.9 ANEMIA, UNSPECIFIED TYPE: Primary | ICD-10-CM

## 2023-05-05 LAB
BASOPHILS # BLD AUTO: 0.03 10*3/MM3 (ref 0–0.2)
BASOPHILS NFR BLD AUTO: 0.6 % (ref 0–1.5)
DEPRECATED RDW RBC AUTO: 56.6 FL (ref 37–54)
EOSINOPHIL # BLD AUTO: 0.15 10*3/MM3 (ref 0–0.4)
EOSINOPHIL NFR BLD AUTO: 3 % (ref 0.3–6.2)
ERYTHROCYTE [DISTWIDTH] IN BLOOD BY AUTOMATED COUNT: 15.5 % (ref 12.3–15.4)
HCT VFR BLD AUTO: 27.2 % (ref 37.5–51)
HGB BLD-MCNC: 8.3 G/DL (ref 13–17.7)
IMM GRANULOCYTES # BLD AUTO: 0.02 10*3/MM3 (ref 0–0.05)
IMM GRANULOCYTES NFR BLD AUTO: 0.4 % (ref 0–0.5)
LYMPHOCYTES # BLD AUTO: 0.92 10*3/MM3 (ref 0.7–3.1)
LYMPHOCYTES NFR BLD AUTO: 18.3 % (ref 19.6–45.3)
MCH RBC QN AUTO: 30.4 PG (ref 26.6–33)
MCHC RBC AUTO-ENTMCNC: 30.5 G/DL (ref 31.5–35.7)
MCV RBC AUTO: 99.6 FL (ref 79–97)
MONOCYTES # BLD AUTO: 0.4 10*3/MM3 (ref 0.1–0.9)
MONOCYTES NFR BLD AUTO: 8 % (ref 5–12)
NEUTROPHILS NFR BLD AUTO: 3.51 10*3/MM3 (ref 1.7–7)
NEUTROPHILS NFR BLD AUTO: 69.7 % (ref 42.7–76)
NRBC BLD AUTO-RTO: 0 /100 WBC (ref 0–0.2)
PLATELET # BLD AUTO: 194 10*3/MM3 (ref 140–450)
PMV BLD AUTO: 9.2 FL (ref 6–12)
RBC # BLD AUTO: 2.73 10*6/MM3 (ref 4.14–5.8)
WBC NRBC COR # BLD: 5.03 10*3/MM3 (ref 3.4–10.8)

## 2023-05-05 PROCEDURE — 85025 COMPLETE CBC W/AUTO DIFF WBC: CPT | Performed by: INTERNAL MEDICINE

## 2023-05-05 PROCEDURE — 36415 COLL VENOUS BLD VENIPUNCTURE: CPT | Performed by: INTERNAL MEDICINE

## 2023-05-05 RX ORDER — DOXYCYCLINE HYCLATE 50 MG/1
324 CAPSULE, GELATIN COATED ORAL DAILY
COMMUNITY
Start: 2023-05-01

## 2023-05-05 RX ORDER — GLUCAGON INJECTION, SOLUTION 1 MG/.2ML
INJECTION, SOLUTION SUBCUTANEOUS
COMMUNITY
Start: 2023-02-15

## 2023-05-05 NOTE — PROGRESS NOTES
Cece Teixeira is a 82 y.o. male.     History of Present Illness   He has sig bleeding and blood loss after pulled teeth  He had several episodes in the er to stop the bleeding over 4 days  His hb is still low and he has seen cards and they do not want to give more blood    He is taking iron.  He is also taking B12.  He has been extremely exhausted with any kind of exertion and does not like this as he usually walks a couple of miles every day.    The following portions of the patient's history were reviewed and updated as appropriate: allergies, current medications, past medical history, past social history and problem list.  He has been eating okay.  Review of Systems  He has noted some black stools since he swallowed a lot of blood  Objective   Physical Exam  Vitals reviewed.   Constitutional:       Appearance: He is well-developed.   HENT:      Head: Normocephalic and atraumatic.      Right Ear: External ear normal.      Left Ear: External ear normal.   Eyes:      Conjunctiva/sclera: Conjunctivae normal.      Pupils: Pupils are equal, round, and reactive to light.   Neck:      Thyroid: No thyromegaly.      Trachea: No tracheal deviation.   Cardiovascular:      Rate and Rhythm: Normal rate and regular rhythm.      Heart sounds: Normal heart sounds.   Pulmonary:      Effort: Pulmonary effort is normal.      Breath sounds: Normal breath sounds.   Abdominal:      General: Bowel sounds are normal. There is no distension.      Palpations: Abdomen is soft.      Tenderness: There is no abdominal tenderness.   Musculoskeletal:         General: No deformity. Normal range of motion.      Cervical back: Normal range of motion.   Skin:     General: Skin is warm and dry.   Neurological:      Mental Status: He is alert and oriented to person, place, and time.   Psychiatric:         Behavior: Behavior normal.         Thought Content: Thought content normal.         Judgment: Judgment normal.         Vitals:     05/05/23 0802   BP: 124/60   Pulse: 60   Temp: 97.5 °F (36.4 °C)   SpO2: 99%     Body mass index is 24.38 kg/m².    ER records reviewed.  Hemoglobin as of May 1 was 7.5.  BUN and creatinine were normal    Assessment & Plan   Diagnoses and all orders for this visit:    1. Anemia, unspecified type (Primary)  -     CBC & Differential      1.  Anemia: Status post dental bleed.  He is no longer bleeding.  He did receive a couple of units of blood but his hemoglobin is still low.  He saw cardiology a couple of days ago and they do not want to see him transfuse any further unless they have to.  He does not like the fact that he is still so winded with exertion but I explained to him this is going to take time.  He has all the raw materials his body needs to make new red blood cells.  I think it would put his mind at ease to know that his hemoglobin is improving so we will check a hemoglobin today.  He is not have any chest pain but if that were to occur he knows he needs to talk to cardiology or go to the ER.  He has not had any further bleeding and will follow-up with the oral surgeon next week

## 2023-05-22 ENCOUNTER — LAB (OUTPATIENT)
Dept: LAB | Facility: HOSPITAL | Age: 83
End: 2023-05-22
Payer: MEDICARE

## 2023-05-22 ENCOUNTER — TELEPHONE (OUTPATIENT)
Dept: INTERNAL MEDICINE | Facility: CLINIC | Age: 83
End: 2023-05-22
Payer: MEDICARE

## 2023-05-22 DIAGNOSIS — D64.9 ANEMIA, UNSPECIFIED TYPE: Primary | ICD-10-CM

## 2023-05-22 LAB
BASOPHILS # BLD AUTO: 0.02 10*3/MM3 (ref 0–0.2)
BASOPHILS NFR BLD AUTO: 0.4 % (ref 0–1.5)
DEPRECATED RDW RBC AUTO: 58.4 FL (ref 37–54)
EOSINOPHIL # BLD AUTO: 0.15 10*3/MM3 (ref 0–0.4)
EOSINOPHIL NFR BLD AUTO: 3.3 % (ref 0.3–6.2)
ERYTHROCYTE [DISTWIDTH] IN BLOOD BY AUTOMATED COUNT: 15.9 % (ref 12.3–15.4)
HCT VFR BLD AUTO: 32.1 % (ref 37.5–51)
HGB BLD-MCNC: 9.6 G/DL (ref 13–17.7)
IMM GRANULOCYTES # BLD AUTO: 0.01 10*3/MM3 (ref 0–0.05)
IMM GRANULOCYTES NFR BLD AUTO: 0.2 % (ref 0–0.5)
LYMPHOCYTES # BLD AUTO: 0.97 10*3/MM3 (ref 0.7–3.1)
LYMPHOCYTES NFR BLD AUTO: 21.4 % (ref 19.6–45.3)
MCH RBC QN AUTO: 30.1 PG (ref 26.6–33)
MCHC RBC AUTO-ENTMCNC: 29.9 G/DL (ref 31.5–35.7)
MCV RBC AUTO: 100.6 FL (ref 79–97)
MONOCYTES # BLD AUTO: 0.45 10*3/MM3 (ref 0.1–0.9)
MONOCYTES NFR BLD AUTO: 9.9 % (ref 5–12)
NEUTROPHILS NFR BLD AUTO: 2.94 10*3/MM3 (ref 1.7–7)
NEUTROPHILS NFR BLD AUTO: 64.8 % (ref 42.7–76)
NRBC BLD AUTO-RTO: 0 /100 WBC (ref 0–0.2)
PLATELET # BLD AUTO: 128 10*3/MM3 (ref 140–450)
PMV BLD AUTO: 9.5 FL (ref 6–12)
RBC # BLD AUTO: 3.19 10*6/MM3 (ref 4.14–5.8)
WBC NRBC COR # BLD: 4.54 10*3/MM3 (ref 3.4–10.8)

## 2023-05-22 PROCEDURE — 85025 COMPLETE CBC W/AUTO DIFF WBC: CPT | Performed by: INTERNAL MEDICINE

## 2023-05-22 PROCEDURE — 36415 COLL VENOUS BLD VENIPUNCTURE: CPT | Performed by: INTERNAL MEDICINE

## 2023-05-22 NOTE — TELEPHONE ENCOUNTER
Per VO from Dr Sweet I have placed an order for CBC. Pt aware. He is going downstairs to get those done.

## 2023-05-22 NOTE — TELEPHONE ENCOUNTER
"    Caller: Urban Teixeira \"Carlo\"    Relationship: Self    Best call back number: [phone]     What orders are you requesting (i.e. lab or imaging): LAB ORDERS    In what timeframe would the patient need to come in: AT DR. MENDEZ'S RECOMMENDATION    Where will you receive your lab/imaging services: [facility].    Additional notes: PATIENT IS CALLING TO STATE HE HAD LABS DRAWN A COUPLE WEEKS AGO AND HIS IRON WAS LOW AND HE WAS HAVING PROBLEM WITH BREATHING.  HE STATES HE IS STILL HAVING A PROBLEM BREATHING AND WANTS TO KNOW WHEN HE CAN COME BACK IN TO GET LABS DONE TO SEE IF THE IRON LEVELS ARE IMPROVING.  HE STATES HE WAS TOLD THAT HIS BREATHING WOULD GET BETTER WHEN THE IRON IS BUILT BACK UP.  HE STATES HE CAN ONLY WALK A FEW FEET WITHOUT GETTING SHORT WINDED.      PLEASE ADVISE.            "

## 2023-07-26 RX ORDER — ATORVASTATIN CALCIUM 80 MG/1
TABLET, FILM COATED ORAL
Qty: 90 TABLET | Refills: 1 | Status: SHIPPED | OUTPATIENT
Start: 2023-07-26

## 2023-07-27 ENCOUNTER — OFFICE VISIT (OUTPATIENT)
Dept: INTERNAL MEDICINE | Facility: CLINIC | Age: 83
End: 2023-07-27
Payer: MEDICARE

## 2023-07-27 VITALS
WEIGHT: 165.5 LBS | DIASTOLIC BLOOD PRESSURE: 72 MMHG | TEMPERATURE: 97.6 F | HEIGHT: 70 IN | SYSTOLIC BLOOD PRESSURE: 146 MMHG | OXYGEN SATURATION: 100 % | HEART RATE: 64 BPM | BODY MASS INDEX: 23.69 KG/M2

## 2023-07-27 DIAGNOSIS — E78.5 HYPERLIPIDEMIA, UNSPECIFIED HYPERLIPIDEMIA TYPE: ICD-10-CM

## 2023-07-27 DIAGNOSIS — I48.0 PAROXYSMAL ATRIAL FIBRILLATION: ICD-10-CM

## 2023-07-27 DIAGNOSIS — I15.9 SECONDARY HYPERTENSION: Primary | ICD-10-CM

## 2023-07-27 DIAGNOSIS — E11.65 TYPE 2 DIABETES MELLITUS WITH HYPERGLYCEMIA, WITHOUT LONG-TERM CURRENT USE OF INSULIN: ICD-10-CM

## 2023-07-27 PROCEDURE — 1160F RVW MEDS BY RX/DR IN RCRD: CPT | Performed by: INTERNAL MEDICINE

## 2023-07-27 PROCEDURE — 3077F SYST BP >= 140 MM HG: CPT | Performed by: INTERNAL MEDICINE

## 2023-07-27 PROCEDURE — 99214 OFFICE O/P EST MOD 30 MIN: CPT | Performed by: INTERNAL MEDICINE

## 2023-07-27 PROCEDURE — 3078F DIAST BP <80 MM HG: CPT | Performed by: INTERNAL MEDICINE

## 2023-07-27 PROCEDURE — 1159F MED LIST DOCD IN RCRD: CPT | Performed by: INTERNAL MEDICINE

## 2023-07-27 RX ORDER — FLURBIPROFEN SODIUM 0.3 MG/ML
SOLUTION/ DROPS OPHTHALMIC
COMMUNITY
Start: 2023-05-09

## 2023-07-27 RX ORDER — LOSARTAN POTASSIUM 100 MG/1
100 TABLET ORAL DAILY
Qty: 90 TABLET | Refills: 1 | Status: SHIPPED | OUTPATIENT
Start: 2023-07-27

## 2023-07-27 NOTE — PROGRESS NOTES
Cece Teixeira is a 82 y.o. male.     History of Present Illness   Pt has been compliant with diabetes meds. No side effects from medication No episodes of hypoglycemia. Patient denies any polyuria or polydipsia  Pt has been taking BP meds as prescribed without any problems.  No HA  No episodes of orthostasis  Pt has been taking cholesterol meds as prescribed.  No difficulties with myalgias.     The following portions of the patient's history were reviewed and updated as appropriate: allergies, current medications, past family history, past medical history, past social history, past surgical history, and problem list.  He is walking every day 2 miles a day   has to rest sometme during the walk    Review of Systems    Objective   Physical Exam  Vitals reviewed.   Constitutional:       Appearance: He is well-developed.   HENT:      Head: Normocephalic and atraumatic.      Right Ear: External ear normal.      Left Ear: External ear normal.   Eyes:      Conjunctiva/sclera: Conjunctivae normal.      Pupils: Pupils are equal, round, and reactive to light.   Neck:      Thyroid: No thyromegaly.      Trachea: No tracheal deviation.   Cardiovascular:      Rate and Rhythm: Normal rate and regular rhythm.      Heart sounds: Normal heart sounds.   Pulmonary:      Effort: Pulmonary effort is normal.      Breath sounds: Normal breath sounds.   Abdominal:      General: Bowel sounds are normal. There is no distension.      Palpations: Abdomen is soft.      Tenderness: There is no abdominal tenderness.   Musculoskeletal:         General: No deformity. Normal range of motion.      Cervical back: Normal range of motion.   Skin:     General: Skin is warm and dry.   Neurological:      Mental Status: He is alert and oriented to person, place, and time.   Psychiatric:         Behavior: Behavior normal.         Thought Content: Thought content normal.         Judgment: Judgment normal.       Vitals:    07/27/23 0913   BP:  146/72   Pulse: 64   Temp: 97.6 °F (36.4 °C)   SpO2: 100%     Body mass index is 23.75 kg/m².         Assessment & Plan   Diagnoses and all orders for this visit:    1. Secondary hypertension (Primary)    2. Hyperlipidemia, unspecified hyperlipidemia type    3. Type 2 diabetes mellitus with hyperglycemia, without long-term current use of insulin    4. Paroxysmal atrial fibrillation    Other orders  -     losartan (COZAAR) 100 MG tablet; Take 1 tablet by mouth Daily.  Dispense: 90 tablet; Refill: 1       PAF-  He is doing well with meds  no falls on xarelto  no palpitations  HPL-  ok with lipitor  DM-  stable  seeing endo  HTN-  will increase the losartan 100mg

## 2023-10-12 RX ORDER — CARVEDILOL 12.5 MG/1
12.5 TABLET ORAL 2 TIMES DAILY WITH MEALS
Qty: 180 TABLET | Refills: 1 | Status: SHIPPED | OUTPATIENT
Start: 2023-10-12

## 2023-10-12 NOTE — TELEPHONE ENCOUNTER
"Caller: Urban Teixeira \"Carlo\"    Relationship: Self    Best call back number: [phone]     Requested Prescriptions:   Requested Prescriptions     Pending Prescriptions Disp Refills    carvedilol (COREG) 12.5 MG tablet 180 tablet 1     Sig: Take 1 tablet by mouth 2 (Two) Times a Day With Meals.        Pharmacy where request should be sent: [facility] #[id number] - [place], [place] - [address] AT [facility] - [phone] [facility] [phone] FX     Last office visit with prescribing clinician: 7/27/2023   Last telemedicine visit with prescribing clinician: Visit date not found   Next office visit with prescribing clinician: 1/31/2024     Additional details provided by patient: PATIENT STATES THAT HE HAS 2 DAY SUPPLY.  HE SAYS THAT HE IS GOING OUT OF [place] ON [place] 10/15/23 AND WOULD LIKE TO  THIS PRESCRIPTION BEFORE HE LEAVES TOWN.    Does the patient have less than a 3 day supply:  [x] Yes  [] No    Would you like a call back once the refill request has been completed: [] Yes [] No    If the office needs to give you a call back, can they leave a voicemail: [] Yes [] No    PLEASE ADVISE.    Carmen Kamara Rep   10/12/23 14:30 EDT         "

## 2023-11-29 RX ORDER — LOSARTAN POTASSIUM 100 MG/1
100 TABLET ORAL DAILY
Qty: 90 TABLET | Refills: 1 | Status: SHIPPED | OUTPATIENT
Start: 2023-11-29 | End: 2023-11-30

## 2023-11-30 ENCOUNTER — OFFICE VISIT (OUTPATIENT)
Dept: INTERNAL MEDICINE | Facility: CLINIC | Age: 83
End: 2023-11-30
Payer: MEDICARE

## 2023-11-30 VITALS
DIASTOLIC BLOOD PRESSURE: 74 MMHG | BODY MASS INDEX: 24.65 KG/M2 | OXYGEN SATURATION: 99 % | HEART RATE: 64 BPM | WEIGHT: 172.2 LBS | TEMPERATURE: 98.2 F | HEIGHT: 70 IN | SYSTOLIC BLOOD PRESSURE: 136 MMHG

## 2023-11-30 DIAGNOSIS — I15.9 SECONDARY HYPERTENSION: ICD-10-CM

## 2023-11-30 DIAGNOSIS — E11.65 TYPE 2 DIABETES MELLITUS WITH HYPERGLYCEMIA, WITHOUT LONG-TERM CURRENT USE OF INSULIN: ICD-10-CM

## 2023-11-30 DIAGNOSIS — E78.5 HYPERLIPIDEMIA, UNSPECIFIED HYPERLIPIDEMIA TYPE: Primary | ICD-10-CM

## 2023-11-30 PROCEDURE — 3075F SYST BP GE 130 - 139MM HG: CPT | Performed by: INTERNAL MEDICINE

## 2023-11-30 PROCEDURE — 3078F DIAST BP <80 MM HG: CPT | Performed by: INTERNAL MEDICINE

## 2023-11-30 PROCEDURE — 99213 OFFICE O/P EST LOW 20 MIN: CPT | Performed by: INTERNAL MEDICINE

## 2023-11-30 RX ORDER — OLMESARTAN MEDOXOMIL 40 MG/1
40 TABLET ORAL DAILY
Qty: 90 TABLET | Refills: 1 | Status: SHIPPED | OUTPATIENT
Start: 2023-11-30

## 2023-11-30 NOTE — PROGRESS NOTES
Cece Teixeira is a 83 y.o. male.     Hypertension       His bp has been up and down  last visit we increased the losartan to 100mg a day but he has been taking bid    sugars have been up[ and down  Pt has been compliant with diabetes meds. No side effects from medication HE has had some low sugars at nightPatient denies any polyuria or polydipsia    The following portions of the patient's history were reviewed and updated as appropriate: allergies, current medications, past family history, past medical history, past social history, past surgical history, and problem list.    Review of Systems    Objective   Physical Exam  Vitals reviewed.   Constitutional:       Appearance: He is well-developed.   HENT:      Head: Normocephalic and atraumatic.      Right Ear: External ear normal.      Left Ear: External ear normal.   Eyes:      Conjunctiva/sclera: Conjunctivae normal.      Pupils: Pupils are equal, round, and reactive to light.   Neck:      Thyroid: No thyromegaly.      Trachea: No tracheal deviation.   Cardiovascular:      Rate and Rhythm: Normal rate and regular rhythm.      Heart sounds: Normal heart sounds.   Pulmonary:      Effort: Pulmonary effort is normal.      Breath sounds: Normal breath sounds.   Abdominal:      General: Bowel sounds are normal. There is no distension.      Palpations: Abdomen is soft.      Tenderness: There is no abdominal tenderness.   Musculoskeletal:         General: No deformity. Normal range of motion.      Cervical back: Normal range of motion.   Skin:     General: Skin is warm and dry.   Neurological:      Mental Status: He is alert and oriented to person, place, and time.   Psychiatric:         Behavior: Behavior normal.         Thought Content: Thought content normal.         Judgment: Judgment normal.         Vitals:    11/30/23 1455   BP: 136/74   Pulse: 64   Temp: 98.2 °F (36.8 °C)   SpO2: 99%     Body mass index is 24.71 kg/m².         Assessment & Plan    Diagnoses and all orders for this visit:    1. Hyperlipidemia, unspecified hyperlipidemia type (Primary)    2. Secondary hypertension    3. Type 2 diabetes mellitus with hyperglycemia, without long-term current use of insulin    Other orders  -     olmesartan (Benicar) 40 MG tablet; Take 1 tablet by mouth Daily.  Dispense: 90 tablet; Refill: 1    Hypertension we are going to go ahead and change him to olmesartan 40 mg.  He has been taking the losartan 100 twice a day.  I believe he was supposed to take the 50 mg 2 a day until it was gone and then the new prescription for 100 was to be once a day I think changing over to 40 mg once a day of the olmesartan will be less confusing I also do not think his wrist cuff is reading accurately  Diabetes mellitus: Patient's sugars are under very good control.  He is still having some hypoglycemia in the middle of the night.  He has been taking 3 units of NovoLog insulin at night I have advised him to stop that.  He is seeing endocrine this month

## 2024-01-22 RX ORDER — ATORVASTATIN CALCIUM 80 MG/1
TABLET, FILM COATED ORAL
Qty: 90 TABLET | Refills: 1 | Status: SHIPPED | OUTPATIENT
Start: 2024-01-22

## 2024-01-23 DIAGNOSIS — E11.65 TYPE 2 DIABETES MELLITUS WITH HYPERGLYCEMIA, WITHOUT LONG-TERM CURRENT USE OF INSULIN: ICD-10-CM

## 2024-01-23 DIAGNOSIS — I15.9 SECONDARY HYPERTENSION: ICD-10-CM

## 2024-01-23 DIAGNOSIS — E78.5 HYPERLIPIDEMIA, UNSPECIFIED HYPERLIPIDEMIA TYPE: Primary | ICD-10-CM

## 2024-01-24 ENCOUNTER — TELEPHONE (OUTPATIENT)
Dept: INTERNAL MEDICINE | Facility: CLINIC | Age: 84
End: 2024-01-24

## 2024-01-24 LAB
ALBUMIN SERPL-MCNC: 4.5 G/DL (ref 3.5–5.2)
ALBUMIN/GLOB SERPL: 1.9 G/DL
ALP SERPL-CCNC: 131 U/L (ref 39–117)
ALT SERPL-CCNC: 27 U/L (ref 1–41)
AST SERPL-CCNC: 20 U/L (ref 1–40)
BASOPHILS # BLD AUTO: 0.03 10*3/MM3 (ref 0–0.2)
BASOPHILS NFR BLD AUTO: 0.6 % (ref 0–1.5)
BILIRUB SERPL-MCNC: 0.7 MG/DL (ref 0–1.2)
BUN SERPL-MCNC: 20 MG/DL (ref 8–23)
BUN/CREAT SERPL: 16.9 (ref 7–25)
CALCIUM SERPL-MCNC: 9.4 MG/DL (ref 8.6–10.5)
CHLORIDE SERPL-SCNC: 106 MMOL/L (ref 98–107)
CHOLEST SERPL-MCNC: 102 MG/DL (ref 0–200)
CO2 SERPL-SCNC: 28.5 MMOL/L (ref 22–29)
CREAT SERPL-MCNC: 1.18 MG/DL (ref 0.76–1.27)
EGFRCR SERPLBLD CKD-EPI 2021: 61.6 ML/MIN/1.73
EOSINOPHIL # BLD AUTO: 0.12 10*3/MM3 (ref 0–0.4)
EOSINOPHIL NFR BLD AUTO: 2.2 % (ref 0.3–6.2)
ERYTHROCYTE [DISTWIDTH] IN BLOOD BY AUTOMATED COUNT: 13.8 % (ref 12.3–15.4)
GLOBULIN SER CALC-MCNC: 2.4 GM/DL
GLUCOSE SERPL-MCNC: 66 MG/DL (ref 65–99)
HBA1C MFR BLD: 6.4 % (ref 4.8–5.6)
HCT VFR BLD AUTO: 38.1 % (ref 37.5–51)
HDLC SERPL-MCNC: 46 MG/DL (ref 40–60)
HGB BLD-MCNC: 12.1 G/DL (ref 13–17.7)
IMM GRANULOCYTES # BLD AUTO: 0.02 10*3/MM3 (ref 0–0.05)
IMM GRANULOCYTES NFR BLD AUTO: 0.4 % (ref 0–0.5)
LDLC SERPL CALC-MCNC: 42 MG/DL (ref 0–100)
LDLC/HDLC SERPL: 0.95 {RATIO}
LYMPHOCYTES # BLD AUTO: 0.89 10*3/MM3 (ref 0.7–3.1)
LYMPHOCYTES NFR BLD AUTO: 16.7 % (ref 19.6–45.3)
MCH RBC QN AUTO: 28.9 PG (ref 26.6–33)
MCHC RBC AUTO-ENTMCNC: 31.8 G/DL (ref 31.5–35.7)
MCV RBC AUTO: 91.1 FL (ref 79–97)
MONOCYTES # BLD AUTO: 0.4 10*3/MM3 (ref 0.1–0.9)
MONOCYTES NFR BLD AUTO: 7.5 % (ref 5–12)
NEUTROPHILS # BLD AUTO: 3.88 10*3/MM3 (ref 1.7–7)
NEUTROPHILS NFR BLD AUTO: 72.6 % (ref 42.7–76)
NRBC BLD AUTO-RTO: 0 /100 WBC (ref 0–0.2)
PLATELET # BLD AUTO: 155 10*3/MM3 (ref 140–450)
POTASSIUM SERPL-SCNC: 4.6 MMOL/L (ref 3.5–5.2)
PROT SERPL-MCNC: 6.9 G/DL (ref 6–8.5)
RBC # BLD AUTO: 4.18 10*6/MM3 (ref 4.14–5.8)
SODIUM SERPL-SCNC: 144 MMOL/L (ref 136–145)
T4 FREE SERPL-MCNC: 0.9 NG/DL (ref 0.93–1.7)
TRIGL SERPL-MCNC: 61 MG/DL (ref 0–150)
TSH SERPL DL<=0.005 MIU/L-ACNC: 18.7 UIU/ML (ref 0.27–4.2)
VLDLC SERPL CALC-MCNC: 14 MG/DL (ref 5–40)
WBC # BLD AUTO: 5.34 10*3/MM3 (ref 3.4–10.8)

## 2024-01-24 NOTE — TELEPHONE ENCOUNTER
"  Caller: Urban Teixeira \"Carlo\"    Relationship: Self    Best call back number: [phone]    PATIENT HAD GONE FOR LABS TODAY, FOR A MEDICARE WELLNESS VISIT.    HE WANTED TO INFORM DR MENDEZ HE WAS NOT FASTING FOR THE LABS HE HAD DONE TODAY.  1/24/24.    HE WAS NOT TOLD IN HIS INSTRUCTIONS THAT IT WAS SCHEDULED AND ALMOST MISSED THE APPT THIS MORNING BUT MADE IT TO DRAW HIS LABS, BUT HAD ALREADY ATE.    DOES HE NEED TO REDO THESE?    "

## 2024-01-31 ENCOUNTER — TELEPHONE (OUTPATIENT)
Dept: INTERNAL MEDICINE | Facility: CLINIC | Age: 84
End: 2024-01-31

## 2024-01-31 ENCOUNTER — OFFICE VISIT (OUTPATIENT)
Dept: INTERNAL MEDICINE | Facility: CLINIC | Age: 84
End: 2024-01-31
Payer: MEDICARE

## 2024-01-31 VITALS
OXYGEN SATURATION: 99 % | SYSTOLIC BLOOD PRESSURE: 158 MMHG | HEIGHT: 70 IN | DIASTOLIC BLOOD PRESSURE: 80 MMHG | TEMPERATURE: 98.1 F | BODY MASS INDEX: 24.65 KG/M2 | HEART RATE: 54 BPM | WEIGHT: 172.2 LBS

## 2024-01-31 DIAGNOSIS — E11.65 TYPE 2 DIABETES MELLITUS WITH HYPERGLYCEMIA, WITHOUT LONG-TERM CURRENT USE OF INSULIN: ICD-10-CM

## 2024-01-31 DIAGNOSIS — I15.9 SECONDARY HYPERTENSION: ICD-10-CM

## 2024-01-31 DIAGNOSIS — Z00.00 MEDICARE ANNUAL WELLNESS VISIT, SUBSEQUENT: Primary | ICD-10-CM

## 2024-01-31 PROBLEM — R73.9 HYPERGLYCEMIA: Status: RESOLVED | Noted: 2022-12-08 | Resolved: 2024-01-31

## 2024-01-31 RX ORDER — AMLODIPINE BESYLATE 5 MG/1
5 TABLET ORAL DAILY
Qty: 30 TABLET | Refills: 2 | Status: SHIPPED | OUTPATIENT
Start: 2024-01-31

## 2024-01-31 RX ORDER — LOSARTAN POTASSIUM 100 MG/1
100 TABLET ORAL DAILY
COMMUNITY
End: 2024-02-01

## 2024-01-31 RX ORDER — LEVOTHYROXINE SODIUM 0.05 MG/1
50 TABLET ORAL
Qty: 90 TABLET | Refills: 1 | Status: SHIPPED | OUTPATIENT
Start: 2024-01-31

## 2024-01-31 NOTE — PROGRESS NOTES
The ABCs of the Annual Wellness Visit  Subsequent Medicare Wellness Visit    Subjective      Urban Teixeira is a 83 y.o. male who presents for a Subsequent Medicare Wellness Visit.    The following portions of the patient's history were reviewed and   updated as appropriate: allergies, current medications, past family history, past medical history, past social history, past surgical history, and problem list.    Compared to one year ago, the patient feels his physical   health is better.    Compared to one year ago, the patient feels his mental   health is the same.    Recent Hospitalizations:  He was not admitted to the hospital during the last year.       Current Medical Providers:  Patient Care Team:  Carmela Sweet MD as PCP - General (Internal Medicine)    Outpatient Medications Prior to Visit   Medication Sig Dispense Refill    acetaminophen (TYLENOL) 650 MG 8 hr tablet Take 1 tablet by mouth Every 8 (Eight) Hours As Needed for Mild Pain.      Alcohol Swabs pads Use 1 alcohol swab to clean finger prior to glucose monitoring. 100 each 3    apixaban (ELIQUIS) 5 MG tablet tablet Take 1 tablet by mouth 2 (Two) Times a Day.      Ascorbic Acid (VITAMIN C PO) Take  by mouth.      atorvastatin (LIPITOR) 80 MG tablet TAKE 1 TABLET BY MOUTH EVERY DAY AT NIGHT 90 tablet 1    B-D UF III MINI PEN NEEDLES 31G X 5 MM misc USE 4 NEEDLES A DAY      BABY ASPIRIN PO Take 81 mg by mouth Daily.      carvedilol (COREG) 12.5 MG tablet Take 1 tablet by mouth 2 (Two) Times a Day With Meals. 180 tablet 1    Cyanocobalamin 2500 MCG chewable tablet Chew.      glucose blood test strip Use to test blood glucose up to four times daily as needed. Formulary Compliance Approval. Diagnosis: Type 2 Diabetes - Insulin Dependent 100 each 0    glucose monitor monitoring kit Use to test blood glucose up to four times daily as needed. 1 each 0    Gvoke HypoPen 2-Pack 1 MG/0.2ML solution auto-injector INJECT 1 EACH UNDER THE SKIN IF NEEDED (FAMILY TO  USE ON PT. FOR SEVERE HYPOGLYCEMAI).      insulin aspart (NovoLOG FlexPen) 100 UNIT/ML solution pen-injector sc pen Take 10 units ten minutes before each meal. (Patient taking differently: Inject 11 Units under the skin into the appropriate area as directed 3 (Three) Times a Day With Meals.) 45 mL 3    Insulin Degludec (Tresiba FlexTouch) 200 UNIT/ML solution pen-injector pen injection Take 40 units s/c injection daily (Patient taking differently: Inject 13 Units under the skin into the appropriate area as directed Daily. Take 40 units s/c injection daily) 45 mL 3    Lancets misc Use to test blood glucose up to four times daily as needed. Formulary Compliance Approval. Diagnosis: Type 2 Diabetes - Insulin Dependent 100 each 0    losartan (COZAAR) 100 MG tablet Take 1 tablet by mouth Daily.      metFORMIN ER (GLUCOPHAGE-XR) 500 MG 24 hr tablet Take two tabs twice a day with food (Patient taking differently: Take 2 tablets by mouth 2 (Two) Times a Day.) 360 tablet 3    olmesartan (Benicar) 40 MG tablet Take 1 tablet by mouth Daily. 90 tablet 1    pantoprazole (PROTONIX) 40 MG EC tablet Take 1 tablet by mouth Daily. (Patient not taking: Reported on 11/30/2023)      rivaroxaban (XARELTO) 20 MG tablet Take 1 tablet by mouth Daily. (Patient not taking: Reported on 11/30/2023)       No facility-administered medications prior to visit.       No opioid medication identified on active medication list. I have reviewed chart for other potential  high risk medication/s and harmful drug interactions in the elderly.        Aspirin is on active medication list. Aspirin use is indicated based on review of current medical condition/s. Pros and cons of this therapy have been discussed today. Benefits of this medication outweigh potential harm.  Patient has been encouraged to continue taking this medication.  .      Patient Active Problem List   Diagnosis    Bladder retention    Hernia, inguinal, right    Cervical disc disorder with  "radiculopathy of mid-cervical region    Degeneration of intervertebral disc of mid-cervical region    Herniated cervical disc    Coronary artery disease    Hypertension    Type 2 diabetes mellitus with hyperglycemia    Myocardial infarction (old)    Macular degeneration    Hyperlipidemia    S/P CABG x 3    S/P PTCA (percutaneous transluminal coronary angioplasty)    Paroxysmal atrial fibrillation    Bell palsy    NSTEMI (non-ST elevated myocardial infarction)    S/P drug eluting coronary stent placement    Clinical diagnosis of COVID-19     Advance Care Planning   Advance Care Planning     Advance Directive is not on file.  ACP discussion was held with the patient during this visit. Patient has an advance directive (not in EMR), copy requested.     Objective    Vitals:    24 1121   BP: 158/80   BP Location: Left arm   Patient Position: Sitting   Pulse: 54   Temp: 98.1 °F (36.7 °C)   TempSrc: Oral   SpO2: 99%   Weight: 78.1 kg (172 lb 3.2 oz)   Height: 177.8 cm (70\")   PainSc:   3     Estimated body mass index is 24.71 kg/m² as calculated from the following:    Height as of this encounter: 177.8 cm (70\").    Weight as of this encounter: 78.1 kg (172 lb 3.2 oz).    BMI is within normal parameters. No other follow-up for BMI required.      Does the patient have evidence of cognitive impairment?   No    Lab Results   Component Value Date    CHLPL 102 2024    TRIG 61 2024    HDL 46 2024    LDL 42 2024    VLDL 14 2024    HGBA1C 6.40 (H) 2024          HEALTH RISK ASSESSMENT    Smoking Status:  Social History     Tobacco Use   Smoking Status Never   Smokeless Tobacco Never     Alcohol Consumption:  Social History     Substance and Sexual Activity   Alcohol Use No     Fall Risk Screen:    STEADI Fall Risk Assessment was completed, and patient is at LOW risk for falls.Assessment completed on:2024    Depression Screenin/31/2024    11:00 AM   PHQ-2/PHQ-9 Depression " Screening   Little Interest or Pleasure in Doing Things 0-->not at all   Feeling Down, Depressed or Hopeless 0-->not at all   PHQ-9: Brief Depression Severity Measure Score 0       Health Habits and Functional and Cognitive Screenin/31/2024    11:00 AM   Functional & Cognitive Status   Do you have difficulty preparing food and eating? No   Do you have difficulty bathing yourself, getting dressed or grooming yourself? No   Do you have difficulty using the toilet? No   Do you have difficulty moving around from place to place? No   Do you have trouble with steps or getting out of a bed or a chair? Yes   Current Diet Limited Junk Food   Dental Exam Up to date   Eye Exam Up to date   Exercise (times per week) 7 times per week   Current Exercises Include Walking   Do you need help using the phone?  No   Are you deaf or do you have serious difficulty hearing?  No   Do you need help to go to places out of walking distance? No   Do you need help shopping? No   Do you need help preparing meals?  No   Do you need help with housework?  No   Do you need help with laundry? No   Do you need help taking your medications? No   Do you need help managing money? No   Do you ever drive or ride in a car without wearing a seat belt? No   Have you felt unusual stress, anger or loneliness in the last month? No   Who do you live with? Spouse   If you need help, do you have trouble finding someone available to you? No   Have you been bothered in the last four weeks by sexual problems? No   Do you have difficulty concentrating, remembering or making decisions? No       Age-appropriate Screening Schedule:  Refer to the list below for future screening recommendations based on patient's age, sex and/or medical conditions. Orders for these recommended tests are listed in the plan section. The patient has been provided with a written plan.    Health Maintenance   Topic Date Due    ZOSTER VACCINE (1 of 2) Never done    Pneumococcal Vaccine  65+ (2 of 2 - PCV) 02/25/2016    URINE MICROALBUMIN  06/08/2022    ANNUAL WELLNESS VISIT  11/30/2023    TDAP/TD VACCINES (2 - Td or Tdap) 01/01/2024    COVID-19 Vaccine (4 - 2023-24 season) 02/02/2024 (Originally 9/1/2023)    HEMOGLOBIN A1C  07/24/2024    DIABETIC EYE EXAM  08/21/2024    LIPID PANEL  01/24/2025    COLORECTAL CANCER SCREENING  06/28/2029    INFLUENZA VACCINE  Completed                  CMS Preventative Services Quick Reference  Risk Factors Identified During Encounter:    Inactivity/Sedentary: Patient was advised to exercise at least 150 minutes a week per CDC recommendations.    The above risks/problems have been discussed with the patient.  Pertinent information has been shared with the patient in the After Visit Summary.    Diagnoses and all orders for this visit:    1. Secondary hypertension (Primary)    2. Type 2 diabetes mellitus with hyperglycemia, without long-term current use of insulin        Follow Up:   Next Medicare Wellness visit to be scheduled in 1 year.      An After Visit Summary and PPPS were made available to the patient.

## 2024-01-31 NOTE — TELEPHONE ENCOUNTER
"Caller: Urban Teixeira \"Carlo\"    Relationship: Self    Best call back number: [phone]     Which medication are you concerned about: amLODIPine (NORVASC) 5 MG tablet     Who prescribed you this medication: DR MENDEZ    What are your concerns: PATIENT IS NOT SURE IF HE IS SUPPOSED TO TAKE THE MEDICATION ALONG WITH HIS CURRENT BLOOD PRESSURE MEDICATIONS, OR STOP THEM AND ONLY TAKE THE NEW MEDICATION. REQUESTS A CALL BACK TO CONFIRM  "

## 2024-01-31 NOTE — PROGRESS NOTES
Cece Teixeira is a 83 y.o. male.   He is complaining of some fatigue    Hypertension    Hyperlipidemia    Diabetes     Pt has been compliant with diabetes meds. No side effects from medication No episodes of hypoglycemia. Patient denies any polyuria or polydipsia  He has been been complaining of lack of energy    The following portions of the patient's history were reviewed and updated as appropriate: allergies, current medications, past family history, past medical history, past social history, past surgical history, and problem list.    Review of Systems    Objective   Physical Exam  Vitals reviewed.   Constitutional:       Appearance: He is well-developed.   HENT:      Head: Normocephalic and atraumatic.      Right Ear: External ear normal.      Left Ear: External ear normal.   Eyes:      Conjunctiva/sclera: Conjunctivae normal.      Pupils: Pupils are equal, round, and reactive to light.   Neck:      Thyroid: No thyromegaly.      Trachea: No tracheal deviation.   Cardiovascular:      Rate and Rhythm: Normal rate and regular rhythm.      Heart sounds: Normal heart sounds.   Pulmonary:      Effort: Pulmonary effort is normal.      Breath sounds: Normal breath sounds.   Abdominal:      General: Bowel sounds are normal. There is no distension.      Palpations: Abdomen is soft.      Tenderness: There is no abdominal tenderness.   Musculoskeletal:         General: No deformity. Normal range of motion.      Cervical back: Normal range of motion.   Skin:     General: Skin is warm and dry.   Neurological:      Mental Status: He is alert and oriented to person, place, and time.   Psychiatric:         Behavior: Behavior normal.         Thought Content: Thought content normal.         Judgment: Judgment normal.       Vitals:    01/31/24 1121   BP: 158/80   Pulse: 54   Temp: 98.1 °F (36.7 °C)   SpO2: 99%     Body mass index is 24.71 kg/m².         Assessment & Plan   There are no diagnoses linked to this  encounter.    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  HTN-add amlodipine 5mg and fuin 1month  3.  DM-  sugar dropped to 58 while here  today given some OJ and gradually improved  was 102 after about 30minutes

## 2024-03-07 ENCOUNTER — OFFICE VISIT (OUTPATIENT)
Dept: INTERNAL MEDICINE | Facility: CLINIC | Age: 84
End: 2024-03-07
Payer: MEDICARE

## 2024-03-07 VITALS
WEIGHT: 181.2 LBS | BODY MASS INDEX: 25.94 KG/M2 | HEART RATE: 65 BPM | TEMPERATURE: 97.4 F | SYSTOLIC BLOOD PRESSURE: 112 MMHG | OXYGEN SATURATION: 97 % | HEIGHT: 70 IN | DIASTOLIC BLOOD PRESSURE: 66 MMHG

## 2024-03-07 DIAGNOSIS — I21.4 NSTEMI (NON-ST ELEVATED MYOCARDIAL INFARCTION): Primary | ICD-10-CM

## 2024-03-07 DIAGNOSIS — I15.9 SECONDARY HYPERTENSION: ICD-10-CM

## 2024-03-07 DIAGNOSIS — E11.65 TYPE 2 DIABETES MELLITUS WITH HYPERGLYCEMIA, WITHOUT LONG-TERM CURRENT USE OF INSULIN: ICD-10-CM

## 2024-03-07 PROBLEM — I21.9 MYOCARDIAL INFARCTION: Status: RESOLVED | Noted: 2017-02-25 | Resolved: 2024-03-07

## 2024-03-07 PROBLEM — I25.10 CORONARY ARTERY DISEASE: Status: RESOLVED | Noted: 2017-02-25 | Resolved: 2024-03-07

## 2024-03-07 RX ORDER — FUROSEMIDE 40 MG/1
40 TABLET ORAL DAILY
COMMUNITY
Start: 2024-03-06

## 2024-03-07 RX ORDER — INSULIN ASPART 100 [IU]/ML
INJECTION, SOLUTION INTRAVENOUS; SUBCUTANEOUS
COMMUNITY

## 2024-03-07 RX ORDER — ISOSORBIDE MONONITRATE 30 MG/1
30 TABLET, EXTENDED RELEASE ORAL DAILY
COMMUNITY
Start: 2024-02-29 | End: 2025-02-28

## 2024-03-07 RX ORDER — CLOPIDOGREL BISULFATE 75 MG/1
75 TABLET ORAL DAILY
COMMUNITY

## 2024-03-07 RX ORDER — TRAZODONE HYDROCHLORIDE 50 MG/1
TABLET ORAL
Qty: 30 TABLET | Refills: 3 | Status: SHIPPED | OUTPATIENT
Start: 2024-03-07

## 2024-03-07 RX ORDER — CARVEDILOL 3.12 MG/1
1 TABLET ORAL 2 TIMES DAILY WITH MEALS
COMMUNITY
Start: 2024-02-21

## 2024-03-07 RX ORDER — ACYCLOVIR 400 MG/1
1 TABLET ORAL
COMMUNITY

## 2024-03-07 RX ORDER — AMLODIPINE BESYLATE 10 MG/1
10 TABLET ORAL DAILY
COMMUNITY

## 2024-03-07 RX ORDER — OLMESARTAN MEDOXOMIL 40 MG/1
40 TABLET ORAL DAILY
Qty: 90 TABLET | Refills: 1 | Status: SHIPPED | OUTPATIENT
Start: 2024-03-07

## 2024-03-07 RX ORDER — NITROGLYCERIN 0.4 MG/1
1 TABLET SUBLINGUAL
COMMUNITY
Start: 2024-03-06

## 2024-03-07 NOTE — PROGRESS NOTES
Cece Teixeira is a 83 y.o. male.   Fu after recent admission and stent for MI    History of Present Illness   He has been struggling with sob since d/c  During hospitalization  he had coreg amlodipin, ISMN and plavix.  Asa d/c 3/28  He has not had any falls and is very cautious about tjhis  He recently saw cards and had lasix added but has not started    The following portions of the patient's history were reviewed and updated as appropriate: allergies, current medications, past family history, past medical history, past social history, past surgical history, and problem list.    Review of Systems    Objective   Physical Exam  Vitals reviewed.   Constitutional:       Appearance: He is well-developed.   HENT:      Head: Normocephalic and atraumatic.      Right Ear: External ear normal.      Left Ear: External ear normal.   Eyes:      Conjunctiva/sclera: Conjunctivae normal.      Pupils: Pupils are equal, round, and reactive to light.   Neck:      Thyroid: No thyromegaly.      Trachea: No tracheal deviation.   Cardiovascular:      Rate and Rhythm: Normal rate and regular rhythm.      Heart sounds: Normal heart sounds.   Pulmonary:      Effort: Pulmonary effort is normal.      Breath sounds: Normal breath sounds.   Abdominal:      General: Bowel sounds are normal. There is no distension.      Palpations: Abdomen is soft.      Tenderness: There is no abdominal tenderness.   Musculoskeletal:         General: No deformity. Normal range of motion.      Cervical back: Normal range of motion.   Skin:     General: Skin is warm and dry.   Neurological:      Mental Status: He is alert and oriented to person, place, and time.   Psychiatric:         Behavior: Behavior normal.         Thought Content: Thought content normal.         Judgment: Judgment normal.       Vitals:    03/07/24 0927   BP: 112/66   Pulse: 65   Temp: 97.4 °F (36.3 °C)   SpO2: 97%     Body mass index is 26 kg/m².         Assessment & Plan    Diagnoses and all orders for this visit:    1. NSTEMI (non-ST elevated myocardial infarction) (Primary)    2. Type 2 diabetes mellitus with hyperglycemia, without long-term current use of insulin       MI-  s/p angioplasty but not sure about stent  though cards said they did d/c does not have that  we will get the records and see what that says  whatever the case he is to remain on plavix and asa both for another 3 weeks then stop the asa  HE is starting cardiac rehab  HTN-  ran very high in the hospital and amlodipine 10mg was added.  He will watch BP and he is getting cardiac rehab  DM-  sugar better since sent home and eating normal food  4.  Insomnia-  he cannot sleep at night and wake up freq and gets up and down because he cannot

## 2024-03-13 ENCOUNTER — TELEPHONE (OUTPATIENT)
Dept: INTERNAL MEDICINE | Facility: CLINIC | Age: 84
End: 2024-03-13

## 2024-03-13 ENCOUNTER — TELEPHONE (OUTPATIENT)
Dept: INTERNAL MEDICINE | Facility: CLINIC | Age: 84
End: 2024-03-13
Payer: MEDICARE

## 2024-03-15 ENCOUNTER — LAB (OUTPATIENT)
Dept: LAB | Facility: HOSPITAL | Age: 84
End: 2024-03-15
Payer: MEDICARE

## 2024-03-15 ENCOUNTER — OFFICE VISIT (OUTPATIENT)
Dept: INTERNAL MEDICINE | Facility: CLINIC | Age: 84
End: 2024-03-15
Payer: MEDICARE

## 2024-03-15 ENCOUNTER — HOSPITAL ENCOUNTER (OUTPATIENT)
Dept: GENERAL RADIOLOGY | Facility: HOSPITAL | Age: 84
Discharge: HOME | End: 2024-03-15
Payer: MEDICARE

## 2024-03-15 VITALS
OXYGEN SATURATION: 98 % | BODY MASS INDEX: 27.2 KG/M2 | HEART RATE: 62 BPM | HEIGHT: 70 IN | WEIGHT: 190 LBS | DIASTOLIC BLOOD PRESSURE: 40 MMHG | TEMPERATURE: 97.1 F | SYSTOLIC BLOOD PRESSURE: 102 MMHG

## 2024-03-15 DIAGNOSIS — R58 BLEEDING: ICD-10-CM

## 2024-03-15 DIAGNOSIS — R06.02 SOB (SHORTNESS OF BREATH): Primary | ICD-10-CM

## 2024-03-15 LAB
DEPRECATED RDW RBC AUTO: 60.5 FL (ref 37–54)
ERYTHROCYTE [DISTWIDTH] IN BLOOD BY AUTOMATED COUNT: 16.7 % (ref 12.3–15.4)
HCT VFR BLD AUTO: 29.3 % (ref 37.5–51)
HGB BLD-MCNC: 8.9 G/DL (ref 13–17.7)
MCH RBC QN AUTO: 30.1 PG (ref 26.6–33)
MCHC RBC AUTO-ENTMCNC: 30.4 G/DL (ref 31.5–35.7)
MCV RBC AUTO: 99 FL (ref 79–97)
PLATELET # BLD AUTO: 136 10*3/MM3 (ref 140–450)
PMV BLD AUTO: 9.3 FL (ref 6–12)
RBC # BLD AUTO: 2.96 10*6/MM3 (ref 4.14–5.8)
WBC NRBC COR # BLD AUTO: 6.8 10*3/MM3 (ref 3.4–10.8)

## 2024-03-15 PROCEDURE — 85027 COMPLETE CBC AUTOMATED: CPT | Performed by: FAMILY MEDICINE

## 2024-03-15 PROCEDURE — 3074F SYST BP LT 130 MM HG: CPT | Performed by: FAMILY MEDICINE

## 2024-03-15 PROCEDURE — 99213 OFFICE O/P EST LOW 20 MIN: CPT | Performed by: FAMILY MEDICINE

## 2024-03-15 PROCEDURE — 3078F DIAST BP <80 MM HG: CPT | Performed by: FAMILY MEDICINE

## 2024-03-15 PROCEDURE — 71046 X-RAY EXAM CHEST 2 VIEWS: CPT

## 2024-03-15 PROCEDURE — 36415 COLL VENOUS BLD VENIPUNCTURE: CPT | Performed by: FAMILY MEDICINE

## 2024-03-15 PROCEDURE — 1159F MED LIST DOCD IN RCRD: CPT | Performed by: FAMILY MEDICINE

## 2024-03-15 PROCEDURE — 1160F RVW MEDS BY RX/DR IN RCRD: CPT | Performed by: FAMILY MEDICINE

## 2024-03-15 NOTE — PROGRESS NOTES
Cece Teixeira is a 83 y.o. male.   Chief Complaint   Patient presents with   • Shortness of Breath   • Bleeding/Bruising     scrotum       Scrotal bleeding-patient complains of bleeding from the left side of the scrotum.  First time he noticed it 5 days ago- dripping blood during the shower.  It stopped after 45 minutes.  2 days later he had another bleed, less intense and lasting shorter.  He has no pain in the area.    Last time he was bleeding 2 days ago.  He is on Plavix, Eliquis and aspirin.  He is post angioplasty few weeks ago.  He reports shortness of breath since surgery, it is getting worse.  He saw his cardiologist about a week ago and Lasix was added.  He reports no improvement with SOB.  He gained 9 pounds in 8 days.  He has no chest pain, no palpitations.  He complains of swelling of ankles, it started after the surgery.  Amlodipine was started around the same time.    Review of Systems   Constitutional:  Positive for fatigue.   Respiratory:  Positive for shortness of breath.    Cardiovascular:  Positive for leg swelling. Negative for chest pain and palpitations.         Objective   Wt Readings from Last 3 Encounters:   03/15/24 86.2 kg (190 lb)   03/07/24 82.2 kg (181 lb 3.2 oz)   01/31/24 78.1 kg (172 lb 3.2 oz)      Vitals:    03/15/24 0658   BP: 102/40   Pulse: 62   Temp: 97.1 °F (36.2 °C)   SpO2: 98%     Temp Readings from Last 3 Encounters:   03/15/24 97.1 °F (36.2 °C)   03/07/24 97.4 °F (36.3 °C)   01/31/24 98.1 °F (36.7 °C) (Oral)     BP Readings from Last 3 Encounters:   03/15/24 102/40   03/07/24 112/66   01/31/24 158/80     Pulse Readings from Last 3 Encounters:   03/15/24 62   03/07/24 65   01/31/24 54     Body mass index is 27.26 kg/m².    Physical Exam  Constitutional:       Appearance: Normal appearance.   Cardiovascular:      Rate and Rhythm: Normal rate and regular rhythm.      Heart sounds: Murmur heard.      Comments: Bilateral ankle swelling.  Pulmonary:      Breath  sounds: No wheezing, rhonchi or rales.      Comments: Decreased breathing sounds at the right base.  Genitourinary:     Testes:         Left: Tenderness or swelling not present.      Comments: Varicose veins on the left side of the scrotum.  No active bleeding.  Skin:     General: Skin is warm and dry.       Assessment & Plan   Diagnoses and all orders for this visit:    1. SOB (shortness of breath) (Primary)  -     CBC (No Diff)  -     XR Chest PA & Lateral    2. Bleeding  -     CBC (No Diff)        Shortness of breath and weight gain -we are checking x-ray today.  He is advised to call his cardiologist today and if not able to reach them go to ER.  Scrotal bleeding-varicose veins.  Limited options at this time.  He is on triple anticoagulation.  I recommend supportive underwear.  Cooler showers.  Cardiology plan is for him to remain on the aspirin for another week.  If it does not get better/resolve after aspirin discontinuation may need to ask to see a urologist/vascular.

## 2024-03-17 ENCOUNTER — READMISSION MANAGEMENT (OUTPATIENT)
Dept: CALL CENTER | Facility: HOSPITAL | Age: 84
End: 2024-03-17
Payer: MEDICARE

## 2024-03-17 NOTE — OUTREACH NOTE
Prep Survey      Flowsheet Row Responses   [facility] facility patient discharged from? Non-BH   Is LACE score < 7 ? Non- Discharge   Eligibility [facility]   Date of Admission 03/15/24   Date of Discharge 03/17/24   Discharge Disposition Home or Self Care   Discharge diagnosis Dyspnea, unspecified type (Primary Dx),  Pleural effusion on right,  SASCHA (acute kidney injury),  Anemia, unspecified   Does the patient have one of the following disease processes/diagnoses(primary or secondary)? Other   Does the patient have Home health ordered? No   Is there a DME ordered? No   Prep survey completed? Yes            LOUANN RODRIGUEZ - Registered Nurse

## 2024-03-18 ENCOUNTER — TRANSITIONAL CARE MANAGEMENT TELEPHONE ENCOUNTER (OUTPATIENT)
Dept: CALL CENTER | Facility: HOSPITAL | Age: 84
End: 2024-03-18
Payer: MEDICARE

## 2024-03-18 NOTE — OUTREACH NOTE
Call Center TCM Note      Flowsheet Row Responses   [facility] patient discharged from? Non-  [[facility]]   Does the patient have one of the following disease processes/diagnoses(primary or secondary)? Other   TCM attempt successful? No   Unsuccessful attempts Attempt 2   Call Status Left message            Danyelle Brewer LPN    3/18/2024, 16:38 EDT

## 2024-03-18 NOTE — OUTREACH NOTE
Call Center TCM Note      Flowsheet Row Responses   [facility] patient discharged from? Non-   Does the patient have one of the following disease processes/diagnoses(primary or secondary)? Other   TCM attempt successful? No   Unsuccessful attempts Attempt 1   Call Status Left message            Remy Huizar RN    3/18/2024, 16:06 EDT

## 2024-03-19 ENCOUNTER — TRANSITIONAL CARE MANAGEMENT TELEPHONE ENCOUNTER (OUTPATIENT)
Dept: CALL CENTER | Facility: HOSPITAL | Age: 84
End: 2024-03-19
Payer: MEDICARE

## 2024-03-19 NOTE — OUTREACH NOTE
Call Center TCM Note      Flowsheet Row Responses   [facility] patient discharged from? Non-   Does the patient have one of the following disease processes/diagnoses(primary or secondary)? Other   TCM attempt successful? Yes   Call start time 1436   Call end time 1440   Discharge diagnosis Dyspnea, unspecified type (Primary Dx),  Pleural effusion on right,  SASCHA (acute kidney injury),  Anemia, unspecified   Meds reviewed with patient/caregiver? Yes   Is the patient having any side effects they believe may be caused by any medication additions or changes? No   Does the patient have all medications ordered at discharge? Yes   Is the patient taking all medications as directed (includes completed medication regime)? Yes   Does the patient have an appointment with their PCP within 7-14 days of discharge? No   Nursing Interventions Patient desires to follow up with specialty only, Routed TCM call to PCP office, Patient declined scheduling/rescheduling appointment at this time   Has home health visited the patient within 72 hours of discharge? N/A   Psychosocial issues? No   Did the patient receive a copy of their discharge instructions? Yes   Nursing interventions Reviewed instructions with patient   What is the patient's perception of their health status since discharge? Improving   Is the patient/caregiver able to teach back signs and symptoms related to disease process for when to call PCP? Yes   Is the patient/caregiver able to teach back signs and symptoms related to disease process for when to call 911? Yes   Is the patient/caregiver able to teach back the hierarchy of who to call/visit for symptoms/problems? PCP, Specialist, Home health nurse, Urgent Care, ED, 911 Yes   If the patient is a current smoker, are they able to teach back resources for cessation? Not a smoker   TCM call completed? Yes   Wrap up additional comments D/C DX: Dyspnea, unspecified type (Primary Dx),  Pleural effusion on right,  SASCHA,  Anemia - Pt feeling much better, down about 15 lbs during admit after diuresed. Pt started cardiac rehab today which had been postponed due to most recent admit. Pt would like PCP Dr Kee caro, but declines TCM APPT. Will see PCP at scheduled ov in 05/2024.   Call end time 1440            Oneyda Osuna MA    3/19/2024, 14:43 EDT

## 2024-03-28 DIAGNOSIS — E11.69 TYPE 2 DIABETES MELLITUS WITH OTHER SPECIFIED COMPLICATION, UNSPECIFIED WHETHER LONG TERM INSULIN USE: ICD-10-CM

## 2024-03-28 RX ORDER — METFORMIN HYDROCHLORIDE 500 MG/1
TABLET, EXTENDED RELEASE ORAL
Qty: 360 TABLET | Refills: 1 | Status: SHIPPED | OUTPATIENT
Start: 2024-03-28

## 2024-03-28 RX ORDER — ATORVASTATIN CALCIUM 80 MG/1
80 TABLET, FILM COATED ORAL
Qty: 90 TABLET | Refills: 1 | Status: SHIPPED | OUTPATIENT
Start: 2024-03-28

## 2024-03-28 RX ORDER — FUROSEMIDE 40 MG/1
40 TABLET ORAL DAILY
Qty: 90 TABLET | Refills: 1 | Status: SHIPPED | OUTPATIENT
Start: 2024-03-28

## 2024-03-28 NOTE — TELEPHONE ENCOUNTER
Caller: Briseida Teixeira    Relationship: Emergency Contact    Best call back number: [phone]    Requested Prescriptions:   Requested Prescriptions     Pending Prescriptions Disp Refills    atorvastatin (LIPITOR) 80 MG tablet 90 tablet 1     Sig: Take 1 tablet by mouth every night at bedtime.    metFORMIN ER (GLUCOPHAGE-XR) 500 MG 24 hr tablet 360 tablet 3     Sig: Take two tabs twice a day with food    furosemide (LASIX) 40 MG tablet       Sig: Take 1 tablet by mouth Daily.        Pharmacy where request should be sent: [id number] - [address] [phone] [facility] [phone] FX     Last office visit with prescribing clinician: 3/7/2024   Last telemedicine visit with prescribing clinician: Visit date not found   Next office visit with prescribing clinician: 5/10/2024       Carmen Mujiac Rep   03/28/24 11:23 EDT

## 2024-05-23 ENCOUNTER — OFFICE VISIT (OUTPATIENT)
Dept: INTERNAL MEDICINE | Facility: CLINIC | Age: 84
End: 2024-05-23
Payer: MEDICARE

## 2024-05-23 VITALS
WEIGHT: 170 LBS | SYSTOLIC BLOOD PRESSURE: 90 MMHG | HEIGHT: 70 IN | BODY MASS INDEX: 24.34 KG/M2 | HEART RATE: 75 BPM | OXYGEN SATURATION: 99 % | DIASTOLIC BLOOD PRESSURE: 60 MMHG

## 2024-05-23 DIAGNOSIS — I21.4 NSTEMI (NON-ST ELEVATED MYOCARDIAL INFARCTION): ICD-10-CM

## 2024-05-23 DIAGNOSIS — I95.89 OTHER SPECIFIED HYPOTENSION: ICD-10-CM

## 2024-05-23 DIAGNOSIS — E11.65 TYPE 2 DIABETES MELLITUS WITH HYPERGLYCEMIA, WITHOUT LONG-TERM CURRENT USE OF INSULIN: ICD-10-CM

## 2024-05-23 DIAGNOSIS — I48.0 PAROXYSMAL ATRIAL FIBRILLATION: ICD-10-CM

## 2024-05-23 DIAGNOSIS — I15.9 SECONDARY HYPERTENSION: Primary | ICD-10-CM

## 2024-05-23 PROCEDURE — 3074F SYST BP LT 130 MM HG: CPT | Performed by: INTERNAL MEDICINE

## 2024-05-23 PROCEDURE — 99214 OFFICE O/P EST MOD 30 MIN: CPT | Performed by: INTERNAL MEDICINE

## 2024-05-23 PROCEDURE — 1125F AMNT PAIN NOTED PAIN PRSNT: CPT | Performed by: INTERNAL MEDICINE

## 2024-05-23 PROCEDURE — 3078F DIAST BP <80 MM HG: CPT | Performed by: INTERNAL MEDICINE

## 2024-05-23 RX ORDER — FERROUS GLUCONATE 324(38)MG
324 TABLET ORAL DAILY
COMMUNITY
Start: 2024-03-17

## 2024-05-23 RX ORDER — INSULIN ASPART INJECTION 100 [IU]/ML
6 INJECTION, SOLUTION SUBCUTANEOUS 3 TIMES DAILY
COMMUNITY
Start: 2024-05-09

## 2024-05-23 RX ORDER — I-VITE, TAB 1000-60-2MG (60/BT) 300MCG-200
1 TAB ORAL DAILY
COMMUNITY

## 2024-05-23 RX ORDER — FLUOROURACIL 50 MG/G
CREAM TOPICAL
COMMUNITY
Start: 2024-04-18

## 2024-05-23 NOTE — PROGRESS NOTES
Cece Teixeira is a 83 y.o. male.   Fu HTN    History of Present Illness   He has been doing cardiac rehab  he says he has done ok but does occas feel light headed and dizzy  no one checked his BP during these episodes  No syncope  no sob  Sugars have been excellent  The following portions of the patient's history were reviewed and updated as appropriate: allergies, current medications, past family history, past medical history, past social history, past surgical history, and problem list.  We did call cardiac rehab but they are unable to tell us what any of his vital signs have been during cardiac rehab  Review of Systems    Objective   Physical Exam  Vitals reviewed.   Constitutional:       Appearance: He is well-developed.   HENT:      Head: Normocephalic and atraumatic.      Right Ear: External ear normal.      Left Ear: External ear normal.   Eyes:      Conjunctiva/sclera: Conjunctivae normal.      Pupils: Pupils are equal, round, and reactive to light.   Neck:      Thyroid: No thyromegaly.      Trachea: No tracheal deviation.   Cardiovascular:      Rate and Rhythm: Normal rate and regular rhythm.      Heart sounds: Normal heart sounds.   Pulmonary:      Effort: Pulmonary effort is normal.      Breath sounds: Normal breath sounds.   Abdominal:      General: Bowel sounds are normal. There is no distension.      Palpations: Abdomen is soft.      Tenderness: There is no abdominal tenderness.   Musculoskeletal:         General: No deformity. Normal range of motion.      Cervical back: Normal range of motion.   Skin:     General: Skin is warm and dry.   Neurological:      Mental Status: He is alert and oriented to person, place, and time.   Psychiatric:         Behavior: Behavior normal.         Thought Content: Thought content normal.         Judgment: Judgment normal.         Vitals:    05/23/24 1048   BP: 90/60   Pulse: 75   SpO2: 99%     Body mass index is 24.39 kg/m².         Assessment & Plan    Diagnoses and all orders for this visit:    1. Secondary hypertension (Primary)  -     CBC & Differential  -     Comprehensive Metabolic Panel    2. Other specified hypotension    3. Type 2 diabetes mellitus with hyperglycemia, without long-term current use of insulin    4. Paroxysmal atrial fibrillation    5. NSTEMI (non-ST elevated myocardial infarction)      1.  Coronary artery disease: Patient was recently admitted to the hospital for an MI back in February at that time he had high blood pressure and was clearly volume overloaded.  He was put on Lasix twice a day and 10 mg of amlodipine was added to the olmesartan.  Since that time patient has been feeling relatively well.  He has not had any swelling but he does have some episodes of feeling lightheaded.  He has been going to cardiac rehab and occasionally will have to sit down to improve his balance.  Unfortunately we have no vital signs from these episodes.  His last ejection fraction was noted to be 58%.  Does not seem to me like he is a patient who continues to need 40 mg twice a day of Lasix and this may very well be contributing to his acute renal failure.  We will go ahead and discontinue the Lasix and also the amlodipine monitor this closely and make adjustments as needed  2.  Hypotension: Likely from the 40 mg of Lasix twice a day and the 10 mg of amlodipine added onto the olmesartan.  We will discontinue the amlodipine and the Lasix.  Patient is going to watch closely for any swelling or changes in blood pressure in addition patient has been taking all of his blood pressure medications at once first thing in the morning which would explain why he gets lightheaded or dizzy later in the day.  He checks his blood pressure before he takes his medication and it has been normal unfortunately he has not been checking after he takes his medication.  If we need to add back either of the above medications we are going to adjust the time of day he takes  them  3.  Acute renal failure: Patient's BUN and creatinine are both increasing and he recently had a renal ultrasound that was normal.  I suspect that this is all prerenal  and related to the 40 mg of Lasix twice a day in a patient with an ejection fraction of 58%.  I am stopping the Lasix.  He may very well need a low-dose but we are going to get rid of it right now given his low blood pressure I am seeing him back next week.  We will also follow-up on his labs  4.  Paroxysmal A-fib patient appears to be in sinus and continues on a blood thinner but again episodes of hypotension are highly concerning given the fact that he is on Eliquis.

## 2024-05-24 LAB
ALBUMIN SERPL-MCNC: 4.7 G/DL (ref 3.5–5.2)
ALBUMIN/GLOB SERPL: 2.1 G/DL
ALP SERPL-CCNC: 84 U/L (ref 39–117)
ALT SERPL-CCNC: 15 U/L (ref 1–41)
AST SERPL-CCNC: 18 U/L (ref 1–40)
BASOPHILS # BLD AUTO: 0.02 10*3/MM3 (ref 0–0.2)
BASOPHILS NFR BLD AUTO: 0.3 % (ref 0–1.5)
BILIRUB SERPL-MCNC: 0.7 MG/DL (ref 0–1.2)
BUN SERPL-MCNC: 24 MG/DL (ref 8–23)
BUN/CREAT SERPL: 15.1 (ref 7–25)
CALCIUM SERPL-MCNC: 9.7 MG/DL (ref 8.6–10.5)
CHLORIDE SERPL-SCNC: 101 MMOL/L (ref 98–107)
CO2 SERPL-SCNC: 30 MMOL/L (ref 22–29)
CREAT SERPL-MCNC: 1.59 MG/DL (ref 0.76–1.27)
EGFRCR SERPLBLD CKD-EPI 2021: 43.1 ML/MIN/1.73
EOSINOPHIL # BLD AUTO: 0.08 10*3/MM3 (ref 0–0.4)
EOSINOPHIL NFR BLD AUTO: 1.4 % (ref 0.3–6.2)
ERYTHROCYTE [DISTWIDTH] IN BLOOD BY AUTOMATED COUNT: 13.7 % (ref 12.3–15.4)
GLOBULIN SER CALC-MCNC: 2.2 GM/DL
GLUCOSE SERPL-MCNC: 85 MG/DL (ref 65–99)
HCT VFR BLD AUTO: 35.2 % (ref 37.5–51)
HGB BLD-MCNC: 11.3 G/DL (ref 13–17.7)
IMM GRANULOCYTES # BLD AUTO: 0.01 10*3/MM3 (ref 0–0.05)
IMM GRANULOCYTES NFR BLD AUTO: 0.2 % (ref 0–0.5)
LYMPHOCYTES # BLD AUTO: 1.19 10*3/MM3 (ref 0.7–3.1)
LYMPHOCYTES NFR BLD AUTO: 20.2 % (ref 19.6–45.3)
MCH RBC QN AUTO: 31 PG (ref 26.6–33)
MCHC RBC AUTO-ENTMCNC: 32.1 G/DL (ref 31.5–35.7)
MCV RBC AUTO: 96.4 FL (ref 79–97)
MONOCYTES # BLD AUTO: 0.51 10*3/MM3 (ref 0.1–0.9)
MONOCYTES NFR BLD AUTO: 8.6 % (ref 5–12)
NEUTROPHILS # BLD AUTO: 4.09 10*3/MM3 (ref 1.7–7)
NEUTROPHILS NFR BLD AUTO: 69.3 % (ref 42.7–76)
NRBC BLD AUTO-RTO: 0 /100 WBC (ref 0–0.2)
PLATELET # BLD AUTO: 187 10*3/MM3 (ref 140–450)
POTASSIUM SERPL-SCNC: 4.5 MMOL/L (ref 3.5–5.2)
PROT SERPL-MCNC: 6.9 G/DL (ref 6–8.5)
RBC # BLD AUTO: 3.65 10*6/MM3 (ref 4.14–5.8)
SODIUM SERPL-SCNC: 143 MMOL/L (ref 136–145)
WBC # BLD AUTO: 5.9 10*3/MM3 (ref 3.4–10.8)

## 2024-05-29 ENCOUNTER — TELEMEDICINE (OUTPATIENT)
Dept: INTERNAL MEDICINE | Facility: CLINIC | Age: 84
End: 2024-05-29
Payer: MEDICARE

## 2024-05-29 VITALS — SYSTOLIC BLOOD PRESSURE: 145 MMHG | HEART RATE: 71 BPM | DIASTOLIC BLOOD PRESSURE: 76 MMHG

## 2024-05-29 DIAGNOSIS — Z95.1 S/P CABG X 3: ICD-10-CM

## 2024-05-29 DIAGNOSIS — I10 PRIMARY HYPERTENSION: Primary | ICD-10-CM

## 2024-05-29 PROCEDURE — 3078F DIAST BP <80 MM HG: CPT | Performed by: INTERNAL MEDICINE

## 2024-05-29 PROCEDURE — 1125F AMNT PAIN NOTED PAIN PRSNT: CPT | Performed by: INTERNAL MEDICINE

## 2024-05-29 PROCEDURE — 3077F SYST BP >= 140 MM HG: CPT | Performed by: INTERNAL MEDICINE

## 2024-05-29 PROCEDURE — 99213 OFFICE O/P EST LOW 20 MIN: CPT | Performed by: INTERNAL MEDICINE

## 2024-05-29 NOTE — PROGRESS NOTES
Cece Teixeira is a 83 y.o. male.   You have chosen to receive care through a telehealth visit.  Do you consent to use a video/audio connection for your medical care today? Yes    Hypertension     He has been feeking ok  he thinks he has gained some weight but he has been eating ok  he deneis any sob and denies any swelling legs    The following portions of the patient's history were reviewed and updated as appropriate: allergies, current medications, past family history, past medical history, past social history, past surgical history, and problem list.    Review of Systems    Objective   Physical Exam  Constitutional:       Appearance: Normal appearance.   Eyes:      Pupils: Pupils are equal, round, and reactive to light.   Neurological:      General: No focal deficit present.      Mental Status: He is alert and oriented to person, place, and time.         Vitals:    05/29/24 1129   BP: 145/76   Pulse: 71     There is no height or weight on file to calculate BMI.         Assessment & Plan   Diagnoses and all orders for this visit:    1. Primary hypertension (Primary)    2. S/P CABG x 3       HTN-  alittle high in the am better after meds  no swelling or SOB since stopping the amlodipine and lasix    he will change benicar to hs and fu in 2 weeks with recheck   2  Elevated creatinie   I suspect this will be better with stopoiung the lasix  he is to call with any increase swelling or SOB

## 2024-06-11 ENCOUNTER — OFFICE VISIT (OUTPATIENT)
Dept: INTERNAL MEDICINE | Facility: CLINIC | Age: 84
End: 2024-06-11
Payer: MEDICARE

## 2024-06-11 VITALS
HEIGHT: 70 IN | OXYGEN SATURATION: 98 % | DIASTOLIC BLOOD PRESSURE: 68 MMHG | HEART RATE: 78 BPM | WEIGHT: 171.9 LBS | SYSTOLIC BLOOD PRESSURE: 112 MMHG | TEMPERATURE: 98 F | BODY MASS INDEX: 24.61 KG/M2

## 2024-06-11 DIAGNOSIS — I10 PRIMARY HYPERTENSION: ICD-10-CM

## 2024-06-11 DIAGNOSIS — D64.9 ANEMIA, UNSPECIFIED TYPE: Primary | ICD-10-CM

## 2024-06-11 LAB
BASOPHILS # BLD AUTO: 0.02 10*3/MM3 (ref 0–0.2)
BASOPHILS NFR BLD AUTO: 0.4 % (ref 0–1.5)
EOSINOPHIL # BLD AUTO: 0.11 10*3/MM3 (ref 0–0.4)
EOSINOPHIL NFR BLD AUTO: 2.1 % (ref 0.3–6.2)
ERYTHROCYTE [DISTWIDTH] IN BLOOD BY AUTOMATED COUNT: 13.3 % (ref 12.3–15.4)
HCT VFR BLD AUTO: 34.1 % (ref 37.5–51)
HGB BLD-MCNC: 11.1 G/DL (ref 13–17.7)
IMM GRANULOCYTES # BLD AUTO: 0.01 10*3/MM3 (ref 0–0.05)
IMM GRANULOCYTES NFR BLD AUTO: 0.2 % (ref 0–0.5)
LYMPHOCYTES # BLD AUTO: 0.93 10*3/MM3 (ref 0.7–3.1)
LYMPHOCYTES NFR BLD AUTO: 17.5 % (ref 19.6–45.3)
MCH RBC QN AUTO: 30.6 PG (ref 26.6–33)
MCHC RBC AUTO-ENTMCNC: 32.6 G/DL (ref 31.5–35.7)
MCV RBC AUTO: 93.9 FL (ref 79–97)
MONOCYTES # BLD AUTO: 0.41 10*3/MM3 (ref 0.1–0.9)
MONOCYTES NFR BLD AUTO: 7.7 % (ref 5–12)
NEUTROPHILS # BLD AUTO: 3.84 10*3/MM3 (ref 1.7–7)
NEUTROPHILS NFR BLD AUTO: 72.1 % (ref 42.7–76)
NRBC BLD AUTO-RTO: 0 /100 WBC (ref 0–0.2)
PLATELET # BLD AUTO: 234 10*3/MM3 (ref 140–450)
RBC # BLD AUTO: 3.63 10*6/MM3 (ref 4.14–5.8)
WBC # BLD AUTO: 5.32 10*3/MM3 (ref 3.4–10.8)

## 2024-06-11 PROCEDURE — 99214 OFFICE O/P EST MOD 30 MIN: CPT | Performed by: INTERNAL MEDICINE

## 2024-06-11 PROCEDURE — 1125F AMNT PAIN NOTED PAIN PRSNT: CPT | Performed by: INTERNAL MEDICINE

## 2024-06-11 PROCEDURE — 3074F SYST BP LT 130 MM HG: CPT | Performed by: INTERNAL MEDICINE

## 2024-06-11 PROCEDURE — 3078F DIAST BP <80 MM HG: CPT | Performed by: INTERNAL MEDICINE

## 2024-06-11 PROCEDURE — 1159F MED LIST DOCD IN RCRD: CPT | Performed by: INTERNAL MEDICINE

## 2024-06-11 PROCEDURE — 1160F RVW MEDS BY RX/DR IN RCRD: CPT | Performed by: INTERNAL MEDICINE

## 2024-06-11 RX ORDER — FUROSEMIDE 40 MG/1
40 TABLET ORAL 2 TIMES DAILY
COMMUNITY

## 2024-06-11 NOTE — PROGRESS NOTES
Subjective   Urban Teixeira is a 83 y.o. male.   Doing better off the amlodipine and on 40mg of lasix rathe than 80    Hypertension    After I saw him the end of May he had stopped both his amlodipine and the Lasix that was started in the hospital.  He did have some increasing swelling and shortness of breath.  The Lasix was started back but at a lower dose of 40 mg since that time he has been doing much better  Still feeling a little weak walking 2 miles a day but had been walking 5 miles.  He denies any orthopnea no PND no lower extremity edema since he resumed the 40 mg of Lasix.  Patient has not had any blood in his stool no nausea or vomiting    The following portions of the patient's history were reviewed and updated as appropriate: allergies, current medications, past family history, past medical history, past social history, past surgical history, and problem list.  Does try to avoid too much salt  Review of Systems  As above blood in stool  Objective   Physical Exam  Vitals reviewed.   Constitutional:       Appearance: He is well-developed.   HENT:      Head: Normocephalic and atraumatic.      Right Ear: External ear normal.      Left Ear: External ear normal.   Eyes:      Conjunctiva/sclera: Conjunctivae normal.      Pupils: Pupils are equal, round, and reactive to light.   Neck:      Thyroid: No thyromegaly.      Trachea: No tracheal deviation.   Cardiovascular:      Rate and Rhythm: Normal rate and regular rhythm.      Heart sounds: Normal heart sounds.   Pulmonary:      Effort: Pulmonary effort is normal.      Breath sounds: Normal breath sounds.   Abdominal:      General: Bowel sounds are normal. There is no distension.      Palpations: Abdomen is soft.      Tenderness: There is no abdominal tenderness.   Musculoskeletal:         General: No deformity. Normal range of motion.      Cervical back: Normal range of motion.   Skin:     General: Skin is warm and dry.   Neurological:      Mental Status: He is  alert and oriented to person, place, and time.   Psychiatric:         Behavior: Behavior normal.         Thought Content: Thought content normal.         Judgment: Judgment normal.         Vitals:    06/11/24 1127   BP: 112/68   Pulse: 78   Temp: 98 °F (36.7 °C)   SpO2: 98%     Body mass index is 24.67 kg/m².         Assessment & Plan   Diagnoses and all orders for this visit:    1. Anemia, unspecified type (Primary)  -     CBC & Differential    2. Primary hypertension  -     Basic Metabolic Panel     Anemia-we will go ahead and recheck hemoglobin today.  He has no evidence of any GI bleed.  His stomach is not bothering him at all no black stools  HTN-blood pressure seems to be much better controlled no more hypotension and his fluid status seems pretty stable on the 40 mg of Lasix.  Will continue the olmesartan and no amlodipine  Congestive heart failure: Clearly he got too dried out with 80 mg of Lasix.  He seems to be doing better on the 40 we will recheck labs today

## 2024-06-12 LAB
BUN SERPL-MCNC: 21 MG/DL (ref 8–23)
BUN/CREAT SERPL: 13.7 (ref 7–25)
CALCIUM SERPL-MCNC: 9.1 MG/DL (ref 8.6–10.5)
CHLORIDE SERPL-SCNC: 103 MMOL/L (ref 98–107)
CO2 SERPL-SCNC: 29 MMOL/L (ref 22–29)
CREAT SERPL-MCNC: 1.53 MG/DL (ref 0.76–1.27)
EGFRCR SERPLBLD CKD-EPI 2021: 45.1 ML/MIN/1.73
GLUCOSE SERPL-MCNC: 122 MG/DL (ref 65–99)
POTASSIUM SERPL-SCNC: 4.5 MMOL/L (ref 3.5–5.2)
SODIUM SERPL-SCNC: 144 MMOL/L (ref 136–145)

## 2024-06-19 RX ORDER — LEVOTHYROXINE SODIUM 0.05 MG/1
50 TABLET ORAL
Qty: 90 TABLET | Refills: 0 | OUTPATIENT
Start: 2024-06-19

## 2024-07-17 RX ORDER — LEVOTHYROXINE SODIUM 0.05 MG/1
50 TABLET ORAL
Qty: 90 TABLET | Refills: 1 | Status: SHIPPED | OUTPATIENT
Start: 2024-07-17

## 2024-09-03 ENCOUNTER — OFFICE VISIT (OUTPATIENT)
Dept: INTERNAL MEDICINE | Facility: CLINIC | Age: 84
End: 2024-09-03
Payer: MEDICARE

## 2024-09-03 VITALS
SYSTOLIC BLOOD PRESSURE: 106 MMHG | OXYGEN SATURATION: 99 % | HEIGHT: 70 IN | DIASTOLIC BLOOD PRESSURE: 60 MMHG | BODY MASS INDEX: 25.22 KG/M2 | WEIGHT: 176.2 LBS | HEART RATE: 79 BPM | TEMPERATURE: 97.7 F

## 2024-09-03 DIAGNOSIS — I48.0 PAROXYSMAL ATRIAL FIBRILLATION: ICD-10-CM

## 2024-09-03 DIAGNOSIS — I10 PRIMARY HYPERTENSION: ICD-10-CM

## 2024-09-03 DIAGNOSIS — D64.9 ANEMIA, UNSPECIFIED TYPE: ICD-10-CM

## 2024-09-03 DIAGNOSIS — E11.649 TYPE 2 DIABETES MELLITUS WITH HYPOGLYCEMIA WITHOUT COMA, WITHOUT LONG-TERM CURRENT USE OF INSULIN: Primary | ICD-10-CM

## 2024-09-03 PROCEDURE — 3078F DIAST BP <80 MM HG: CPT | Performed by: INTERNAL MEDICINE

## 2024-09-03 PROCEDURE — 3074F SYST BP LT 130 MM HG: CPT | Performed by: INTERNAL MEDICINE

## 2024-09-03 PROCEDURE — 1125F AMNT PAIN NOTED PAIN PRSNT: CPT | Performed by: INTERNAL MEDICINE

## 2024-09-03 PROCEDURE — G2211 COMPLEX E/M VISIT ADD ON: HCPCS | Performed by: INTERNAL MEDICINE

## 2024-09-03 PROCEDURE — 99214 OFFICE O/P EST MOD 30 MIN: CPT | Performed by: INTERNAL MEDICINE

## 2024-09-03 NOTE — PROGRESS NOTES
Cece Teixeira is a 83 y.o. male.   Fu gettng labs today  Hypertension    Diabetes       He is getting watchman later this month  He is getting aTEE as well no recent GIB  He says he is have nearly daily drops in sugar  usually overnight  endo is wanting to put him on a pump    The following portions of the patient's history were reviewed and updated as appropriate: allergies, current medications, past family history, past medical history, past social history, past surgical history, and problem list.  He had been walkign daily but no longer due to fatigue      Review of Systems    Objective   Physical Exam  Vitals reviewed.   Constitutional:       Appearance: He is well-developed.   HENT:      Head: Normocephalic and atraumatic.      Right Ear: External ear normal.      Left Ear: External ear normal.   Eyes:      Conjunctiva/sclera: Conjunctivae normal.      Pupils: Pupils are equal, round, and reactive to light.   Neck:      Thyroid: No thyromegaly.      Trachea: No tracheal deviation.   Cardiovascular:      Rate and Rhythm: Normal rate and regular rhythm.      Heart sounds: Normal heart sounds.   Pulmonary:      Effort: Pulmonary effort is normal.      Breath sounds: Normal breath sounds.   Abdominal:      General: Bowel sounds are normal. There is no distension.      Palpations: Abdomen is soft.      Tenderness: There is no abdominal tenderness.   Musculoskeletal:         General: No deformity. Normal range of motion.      Cervical back: Normal range of motion.   Skin:     General: Skin is warm and dry.   Neurological:      Mental Status: He is alert and oriented to person, place, and time.   Psychiatric:         Behavior: Behavior normal.         Thought Content: Thought content normal.         Judgment: Judgment normal.         Vitals:    09/03/24 0957   BP: 106/60   Pulse: 79   Temp: 97.7 °F (36.5 °C)   SpO2: 99%     Body mass index is 25.28 kg/m².         Assessment & Plan   Diagnoses and all  orders for this visit:    1. Type 2 diabetes mellitus with hypoglycemia without coma, without long-term current use of insulin (Primary)    2. Primary hypertension    3. Paroxysmal atrial fibrillation    4. Anemia, unspecified type  -     CBC & Differential       DM-  discussing insulin pump with endo  probably a good idea given his reg hypoglycemia he is going to discuss a continuous glucose monitor.  This is probably a very good idea because he does have hypoglycemia most nights.  He is going to discuss this with them further at his follow-up.  Hyperlipidemia: Okay with atorvastatin  GIB-  better  getting a watchman  but still fatigued from this ordeal but better  will recheck cbc and see how he is doing  AFib-getting  a watchman  Hypothyroidms-  sees nash did

## 2024-09-10 RX ORDER — ATORVASTATIN CALCIUM 80 MG/1
80 TABLET, FILM COATED ORAL NIGHTLY
Qty: 90 TABLET | Refills: 1 | Status: SHIPPED | OUTPATIENT
Start: 2024-09-10

## 2024-09-24 RX ORDER — FUROSEMIDE 40 MG
40 TABLET ORAL DAILY
Qty: 90 TABLET | Refills: 1 | Status: SHIPPED | OUTPATIENT
Start: 2024-09-24

## 2024-09-24 RX ORDER — OLMESARTAN MEDOXOMIL 40 MG/1
40 TABLET ORAL DAILY
Qty: 90 TABLET | Refills: 1 | Status: SHIPPED | OUTPATIENT
Start: 2024-09-24

## 2024-10-30 DIAGNOSIS — E11.69 TYPE 2 DIABETES MELLITUS WITH OTHER SPECIFIED COMPLICATION, UNSPECIFIED WHETHER LONG TERM INSULIN USE: ICD-10-CM

## 2024-10-30 RX ORDER — METFORMIN HYDROCHLORIDE 500 MG/1
TABLET, EXTENDED RELEASE ORAL
Qty: 360 TABLET | Refills: 1 | Status: SHIPPED | OUTPATIENT
Start: 2024-10-30

## 2024-11-19 ENCOUNTER — FLU SHOT (OUTPATIENT)
Dept: INTERNAL MEDICINE | Facility: CLINIC | Age: 84
End: 2024-11-19
Payer: MEDICARE

## 2024-11-19 DIAGNOSIS — Z23 NEED FOR INFLUENZA VACCINATION: Primary | ICD-10-CM

## 2024-11-19 PROCEDURE — G0008 ADMIN INFLUENZA VIRUS VAC: HCPCS | Performed by: INTERNAL MEDICINE

## 2024-11-19 PROCEDURE — 90662 IIV NO PRSV INCREASED AG IM: CPT | Performed by: INTERNAL MEDICINE

## 2024-11-26 ENCOUNTER — READMISSION MANAGEMENT (OUTPATIENT)
Dept: CALL CENTER | Facility: HOSPITAL | Age: 84
End: 2024-11-26
Payer: MEDICARE

## 2024-11-26 NOTE — OUTREACH NOTE
Prep Survey      Flowsheet Row Responses   [employer] facility patient discharged from? Non-BH   Is LACE score < 7 ? Non-BH Discharge   Eligibility [facility]   Date of Admission 11/25/24   Date of Discharge 11/26/24   Discharge Disposition Home or Self Care   Discharge diagnosis Presence of Watchman left atrial appendage closure device (Primary Dx),    Does the patient have one of the following disease processes/diagnoses(primary or secondary)? General Surgery   Does the patient have Home health ordered? No   Is there a DME ordered? No   Prep survey completed? Yes            LOUANN RODRIGUEZ - Registered Nurse

## 2024-11-27 ENCOUNTER — TRANSITIONAL CARE MANAGEMENT TELEPHONE ENCOUNTER (OUTPATIENT)
Dept: CALL CENTER | Facility: HOSPITAL | Age: 84
End: 2024-11-27
Payer: MEDICARE

## 2024-11-27 NOTE — OUTREACH NOTE
Call Center TCM Note      Flowsheet Row Responses   [facility] patient discharged from? Non-BH   Does the patient have one of the following disease processes/diagnoses(primary or secondary)? General Surgery   TCM attempt successful? 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 Yes   Comments Pt states he just wants to f/u with surgeon at this time.   Does the patient have an appointment with their PCP within 7-14 days of discharge? No   Nursing Interventions Patient desires to follow up with specialty only   Psychosocial issues? No   Did the patient receive a copy of their discharge instructions? Yes   Nursing interventions Reviewed instructions with patient   What is the patient's perception of their health status since discharge? Improving   Is the patient/caregiver able to teach back signs and symptoms related to disease process for when to call PCP? Yes   Is the patient/caregiver able to teach back signs and symptoms related to disease process for when to call 911? Yes   Is the patient/caregiver able to teach back the hierarchy of who to call/visit for symptoms/problems? PCP, Specialist, Home health nurse, Urgent Care, ED, 911 Yes   If the patient is a current smoker, are they able to teach back resources for cessation? Not a smoker   Additional teach back comments States he is doing well and just got done with shower.  Went over s/s of infection, using IS, eating a proper diet and lifting restrictions   TCM call completed? Yes   Wrap up additional comments No questions or needs at this time.   Call end time 0822        [facility] discharge    Danyelle Brewer LPN    11/27/2024, 08:24 EST

## 2024-12-26 RX ORDER — LEVOTHYROXINE SODIUM 50 UG/1
50 TABLET ORAL EVERY MORNING
Qty: 90 TABLET | Refills: 0 | Status: SHIPPED | OUTPATIENT
Start: 2024-12-26

## 2025-01-14 ENCOUNTER — OFFICE VISIT (OUTPATIENT)
Dept: INTERNAL MEDICINE | Facility: CLINIC | Age: 85
End: 2025-01-14
Payer: MEDICARE

## 2025-01-14 VITALS
TEMPERATURE: 97.5 F | SYSTOLIC BLOOD PRESSURE: 106 MMHG | WEIGHT: 180.7 LBS | BODY MASS INDEX: 25.87 KG/M2 | HEART RATE: 79 BPM | DIASTOLIC BLOOD PRESSURE: 62 MMHG | OXYGEN SATURATION: 97 % | HEIGHT: 70 IN

## 2025-01-14 DIAGNOSIS — R22.32 ARM MASS, LEFT: ICD-10-CM

## 2025-01-14 DIAGNOSIS — I48.0 PAROXYSMAL ATRIAL FIBRILLATION: Primary | ICD-10-CM

## 2025-01-14 DIAGNOSIS — S40.022S: ICD-10-CM

## 2025-01-14 PROCEDURE — 99214 OFFICE O/P EST MOD 30 MIN: CPT | Performed by: INTERNAL MEDICINE

## 2025-01-14 PROCEDURE — 1125F AMNT PAIN NOTED PAIN PRSNT: CPT | Performed by: INTERNAL MEDICINE

## 2025-01-14 PROCEDURE — 3074F SYST BP LT 130 MM HG: CPT | Performed by: INTERNAL MEDICINE

## 2025-01-14 PROCEDURE — 3078F DIAST BP <80 MM HG: CPT | Performed by: INTERNAL MEDICINE

## 2025-01-14 RX ORDER — ISOSORBIDE MONONITRATE 30 MG/1
30 TABLET, EXTENDED RELEASE ORAL DAILY
COMMUNITY

## 2025-01-14 NOTE — PROGRESS NOTES
Subjective   Urban Teixeira is a 84 y.o. male. Here to follow up with   Chief Complaint   Patient presents with    Follow-up    Arm Pain     Left arm swollen bruising and painful   .    HPI:  A week ago patient awoke with pain and swollen mass prox forarm near elbow.  Patient was seen in the ER he has had an ultrasound a CT scan without contrast.  There is no mass seen but it was difficult to evaluate because he did not have contrast dye due to his renal function.  He said the not occurred first and 24 hours later he had bruising.  At that time he was on Eliquis but that has been discontinued as he has a Watchman procedure.  Initially had pain over the swelling on his forearm but that is now subsided but he still has the swelling there and of course extensive bruising.  Though everything does seem to be gradually improving    The following portions of the patient's history were reviewed and updated as appropriate: allergies, current medications, past family history, past medical history, past social history, past surgical history, and problem list.    Review of Systems  No swelling in the hand.  Objective   Vitals:    01/14/25 0857   BP: 106/62   Pulse: 79   Temp: 97.5 °F (36.4 °C)   SpO2: 97%     Body mass index is 25.93 kg/m².    Physical Exam  Vitals reviewed.   Constitutional:       Appearance: He is well-developed.   HENT:      Head: Normocephalic and atraumatic.      Right Ear: External ear normal.      Left Ear: External ear normal.   Eyes:      Conjunctiva/sclera: Conjunctivae normal.      Pupils: Pupils are equal, round, and reactive to light.   Neck:      Thyroid: No thyromegaly.      Trachea: No tracheal deviation.   Cardiovascular:      Rate and Rhythm: Normal rate and regular rhythm.      Heart sounds: Normal heart sounds.   Pulmonary:      Effort: Pulmonary effort is normal.      Breath sounds: Normal breath sounds.   Musculoskeletal:         General: No deformity. Normal range of motion.      Cervical  back: Normal range of motion.      Comments: Mass on the left forearm that is approximately 4 x 3 cm.  It is very soft relatively mobile..  There is swelling on his upper arm and lower forearm.  There is very minimal pain with palpation over the elbow or the area of swelling   Skin:     General: Skin is warm and dry.   Neurological:      Mental Status: He is alert and oriented to person, place, and time.   Psychiatric:         Behavior: Behavior normal.         Thought Content: Thought content normal.         Judgment: Judgment normal.     Ultrasound of the left forearm shows no DVT  CT scan of the left forearm does not show any definitive mass but difficult to assess due to lack of IV contrast    Assessment and Plan:    1.  Left forearm swelling with pain: I wonder if he has some type of spontaneous hematoma related to the Eliquis.  This has now been stopped.  He is getting better so I have advised some elevation and we will just follow.  I understand the CAT scan was inconclusive due to lack of contrast dye but an MRI is likely going to require contrast dye as well and due to his renal dysfunction I do not think it is worth the risk as long as this continues to improve  2.  A-fib: Status post Watchman off blood thinner doing well

## 2025-01-22 NOTE — PROGRESS NOTES
Cece Teixeira is a 81 y.o. male.   Sugars remain high    History of Present Illness   He has cont to have elevated sugars  He is taking trajenta and rybelsus and amaryl  Not wokring and veyr exoensice  GI SE with metformin    The following portions of the patient's history were reviewed and updated as appropriate: allergies, current medications, past medical history, past social history and problem list.  He does walk daily    Review of Systems    Objective   Physical Exam  Vitals reviewed.   Constitutional:       Appearance: He is well-developed.   HENT:      Head: Normocephalic and atraumatic.      Right Ear: External ear normal.      Left Ear: External ear normal.   Eyes:      Conjunctiva/sclera: Conjunctivae normal.      Pupils: Pupils are equal, round, and reactive to light.   Neck:      Thyroid: No thyromegaly.      Trachea: No tracheal deviation.   Cardiovascular:      Rate and Rhythm: Normal rate and regular rhythm.      Heart sounds: Normal heart sounds.   Pulmonary:      Effort: Pulmonary effort is normal.      Breath sounds: Normal breath sounds.   Abdominal:      General: Bowel sounds are normal. There is no distension.      Palpations: Abdomen is soft.      Tenderness: There is no abdominal tenderness.   Musculoskeletal:         General: No deformity. Normal range of motion.      Cervical back: Normal range of motion.   Skin:     General: Skin is warm and dry.   Neurological:      Mental Status: He is alert and oriented to person, place, and time.   Psychiatric:         Behavior: Behavior normal.         Thought Content: Thought content normal.         Judgment: Judgment normal.         Vitals:    09/20/22 1133   BP: 120/60   Pulse: 79   Temp: 97.3 °F (36.3 °C)   SpO2: 99%     Body mass index is 22.64 kg/m².         Assessment & Plan   Diagnoses and all orders for this visit:    1. Type 2 diabetes mellitus with hyperglycemia, without long-term current use of insulin (HCC)  (Primary)    Other orders  -     Insulin Degludec (Tresiba FlexTouch) 200 UNIT/ML solution pen-injector pen injection; Inject 12 Units under the skin into the appropriate area as directed Daily.  Dispense: 3 mL; Refill: 3    1.  DM-  Cont the tradjenta until gone  Stop other meds add tresiba-  12unitscall with reading and increase as needed              Hg 6.4 (baseline Hb of 8)   Hct 18.1   s/p 1 u PRBC's  Iron: 17, transferrin 116, ferritin 496 TIBC 241, , haptoglobin 201, reticulocytes 37 nl    Low suspicion of hemolytic anemia   Anemia of chronic disease vs iron deficiency anemia

## 2025-02-13 ENCOUNTER — OFFICE VISIT (OUTPATIENT)
Dept: INTERNAL MEDICINE | Facility: CLINIC | Age: 85
End: 2025-02-13
Payer: MEDICARE

## 2025-02-13 VITALS
TEMPERATURE: 97.3 F | HEART RATE: 78 BPM | DIASTOLIC BLOOD PRESSURE: 54 MMHG | BODY MASS INDEX: 25.73 KG/M2 | OXYGEN SATURATION: 99 % | WEIGHT: 179.7 LBS | HEIGHT: 70 IN | SYSTOLIC BLOOD PRESSURE: 104 MMHG

## 2025-02-13 DIAGNOSIS — I10 PRIMARY HYPERTENSION: ICD-10-CM

## 2025-02-13 DIAGNOSIS — Z00.00 MEDICARE ANNUAL WELLNESS VISIT, SUBSEQUENT: Primary | ICD-10-CM

## 2025-02-13 DIAGNOSIS — I48.0 PAROXYSMAL ATRIAL FIBRILLATION: ICD-10-CM

## 2025-02-13 DIAGNOSIS — E78.5 HYPERLIPIDEMIA, UNSPECIFIED HYPERLIPIDEMIA TYPE: ICD-10-CM

## 2025-02-13 DIAGNOSIS — E11.69 TYPE 2 DIABETES MELLITUS WITH OTHER SPECIFIED COMPLICATION, UNSPECIFIED WHETHER LONG TERM INSULIN USE: ICD-10-CM

## 2025-02-13 DIAGNOSIS — Z12.11 COLON CANCER SCREENING: ICD-10-CM

## 2025-02-13 PROCEDURE — 3074F SYST BP LT 130 MM HG: CPT | Performed by: INTERNAL MEDICINE

## 2025-02-13 PROCEDURE — G0009 ADMIN PNEUMOCOCCAL VACCINE: HCPCS | Performed by: INTERNAL MEDICINE

## 2025-02-13 PROCEDURE — 1126F AMNT PAIN NOTED NONE PRSNT: CPT | Performed by: INTERNAL MEDICINE

## 2025-02-13 PROCEDURE — G0439 PPPS, SUBSEQ VISIT: HCPCS | Performed by: INTERNAL MEDICINE

## 2025-02-13 PROCEDURE — 3078F DIAST BP <80 MM HG: CPT | Performed by: INTERNAL MEDICINE

## 2025-02-13 PROCEDURE — 90677 PCV20 VACCINE IM: CPT | Performed by: INTERNAL MEDICINE

## 2025-02-13 PROCEDURE — 1170F FXNL STATUS ASSESSED: CPT | Performed by: INTERNAL MEDICINE

## 2025-02-13 RX ORDER — OLMESARTAN MEDOXOMIL 20 MG/1
20 TABLET ORAL DAILY
Qty: 90 TABLET | Refills: 1 | Status: SHIPPED | OUTPATIENT
Start: 2025-02-13

## 2025-02-13 NOTE — PROGRESS NOTES
Cece Teixeira is a 84 y.o. male.   Fu left arm swelling and  afib  History of Present Illness   He has not had any palp and no issues since the watchman  he is off the blood thinner but still on plavix  Is occasionally feeling tired and occasionally lightheaded upon standing.  He is sleeping okay at night.  He says he is checking his sugars regularly on his continuous glucose monitor and his monitor reports an approximate A1c of 7    The following portions of the patient's history were reviewed and updated as appropriate: allergies, current medications, past family history, past medical history, past social history, past surgical history, and problem list.    Review of Systems    Objective   Physical Exam  Vitals reviewed.   Constitutional:       Appearance: He is well-developed.   HENT:      Head: Normocephalic and atraumatic.      Right Ear: External ear normal.      Left Ear: External ear normal.   Eyes:      Conjunctiva/sclera: Conjunctivae normal.      Pupils: Pupils are equal, round, and reactive to light.   Neck:      Thyroid: No thyromegaly.      Trachea: No tracheal deviation.   Cardiovascular:      Rate and Rhythm: Normal rate and regular rhythm.      Heart sounds: Normal heart sounds.   Pulmonary:      Effort: Pulmonary effort is normal.      Breath sounds: Normal breath sounds.   Abdominal:      General: Bowel sounds are normal. There is no distension.      Palpations: Abdomen is soft.      Tenderness: There is no abdominal tenderness.   Musculoskeletal:         General: No deformity. Normal range of motion.      Cervical back: Normal range of motion.   Skin:     General: Skin is warm and dry.   Neurological:      Mental Status: He is alert and oriented to person, place, and time.   Psychiatric:         Behavior: Behavior normal.         Thought Content: Thought content normal.         Judgment: Judgment normal.         Vitals:    02/13/25 1145   BP: 104/54   Pulse: 78   Temp: 97.3 °F (36.3  °C)   SpO2: 99%     Body mass index is 25.78 kg/m².         Assessment & Plan   Diagnoses and all orders for this visit:    1. Medicare annual wellness visit, subsequent (Primary)    2. Paroxysmal atrial fibrillation  Assessment & Plan:           Orders:  -     CBC & Differential; Future  -     Comprehensive Metabolic Panel; Future  -     TSH Rfx On Abnormal To Free T4; Future  -     Hemoglobin A1c; Future    3. Primary hypertension  Assessment & Plan:             Orders:  -     CBC & Differential; Future  -     Comprehensive Metabolic Panel; Future  -     TSH Rfx On Abnormal To Free T4; Future  -     Hemoglobin A1c; Future    4. Hyperlipidemia, unspecified hyperlipidemia type  -     CBC & Differential; Future  -     Comprehensive Metabolic Panel; Future  -     Lipid Panel With / Chol / HDL Ratio; Future  -     TSH Rfx On Abnormal To Free T4; Future  -     Hemoglobin A1c; Future    5. Colon cancer screening  -     Lipid Panel With / Chol / HDL Ratio; Future    6. Type 2 diabetes mellitus with other specified complication, unspecified whether long term insulin use  -     CBC & Differential; Future  -     Comprehensive Metabolic Panel; Future  -     Lipid Panel With / Chol / HDL Ratio; Future  -     TSH Rfx On Abnormal To Free T4; Future  -     Hemoglobin A1c; Future    Other orders  -     olmesartan (Benicar) 20 MG tablet; Take 1 tablet by mouth Daily.  Dispense: 90 tablet; Refill: 1  -     Pneumococcal Conjugate Vaccine 20-Valent (PCV20)     HTN-  he has been running a little low  we will decrease the olmesartan from 40mg to 20mg follow symptoms for now and he is going to see cardiology in the next couple of weeks and send me a copy of his blood pressure read  PAF-  s/p watchman  no sx  DM-  stale  A1c 6.9cont to wokr on diet and exercise   Left arm swelling resolved not really sure what the etiology of that was but it is resolved now

## 2025-02-13 NOTE — PROGRESS NOTES
Subjective   The ABCs of the Annual Wellness Visit  Medicare Wellness Visit      Urban Teixeira is a 84 y.o. patient who presents for a Medicare Wellness Visit.    The following portions of the patient's history were reviewed and   updated as appropriate: allergies, current medications, past family history, past medical history, past surgical history, and problem list.    Compared to one year ago, the patient's physical   health is the same.  Compared to one year ago, the patient's mental   health is the same.    Recent Hospitalizations:  He was not admitted to the hospital during the last year.     Current Medical Providers:  Patient Care Team:  Carmela Sweet MD as PCP - General (Internal Medicine)  Rich Westfall MD (Cardiology)    Outpatient Medications Prior to Visit   Medication Sig Dispense Refill    Alcohol Swabs pads Use 1 alcohol swab to clean finger prior to glucose monitoring. 100 each 3    Ascorbic Acid (VITAMIN C PO) Take  by mouth.      atorvastatin (LIPITOR) 80 MG tablet TAKE 1 TABLET BY MOUTH AT NIGHT AT BEDTIME 90 tablet 1    B-D UF III MINI PEN NEEDLES 31G X 5 MM misc USE 4 NEEDLES A DAY      carvedilol (COREG) 3.125 MG tablet Take 1 tablet by mouth 2 (Two) Times a Day With Meals.      clopidogrel (PLAVIX) 75 MG tablet Take 1 tablet by mouth Daily.      Continuous Blood Gluc Sensor (Dexcom G7 Sensor) misc Use 1 each Every 10 (Ten) Days.      Cyanocobalamin 2500 MCG chewable tablet Chew.      Fiasp FlexTouch 100 UNIT/ML solution pen-injector 8 Units 3 (Three) Times a Day.      fluorouracil (EFUDEX) 5 % cream APPLY A SMALL AMOUNT TOPICALLY TO AREA OF PRECANCERS ONCE A DAY FOR 3 WEEKS      furosemide (LASIX) 40 MG tablet TAKE 1 TABLET BY MOUTH EVERY DAY 90 tablet 1    glucose blood test strip Use to test blood glucose up to four times daily as needed. Formulary Compliance Approval. Diagnosis: Type 2 Diabetes - Insulin Dependent 100 each 0    glucose monitor monitoring kit Use to test blood glucose up  to four times daily as needed. 1 each 0    Gvoke HypoPen 2-Pack 1 MG/0.2ML solution auto-injector INJECT 1 EACH UNDER THE SKIN IF NEEDED (FAMILY TO USE ON PT. FOR SEVERE HYPOGLYCEMAI).      Insulin Degludec (Tresiba FlexTouch) 200 UNIT/ML solution pen-injector pen injection Take 40 units s/c injection daily (Patient taking differently: Inject 13 Units under the skin into the appropriate area as directed Every Morning.) 45 mL 3    isosorbide mononitrate (IMDUR) 30 MG 24 hr tablet Take 1 tablet by mouth Daily.      Lancets misc Use to test blood glucose up to four times daily as needed. Formulary Compliance Approval. Diagnosis: Type 2 Diabetes - Insulin Dependent 100 each 0    levothyroxine (SYNTHROID, LEVOTHROID) 50 MCG tablet TAKE 1 TABLET BY MOUTH IN THE MORNING 90 tablet 0    metFORMIN ER (GLUCOPHAGE-XR) 500 MG 24 hr tablet TAKE 2 TABLETS BY MOUTH 2 TIMES A DAY WITH FOOD 360 tablet 1    multivitamin with minerals (I-gio) tablet tablet Take 1 tablet by mouth Daily.      nitroglycerin (NITROSTAT) 0.4 MG SL tablet Place 1 tablet under the tongue Every 5 (Five) Minutes As Needed.      olmesartan (BENICAR) 40 MG tablet TAKE 1 TABLET BY MOUTH EVERY DAY 90 tablet 1    acetaminophen (TYLENOL) 650 MG 8 hr tablet Take 1 tablet by mouth Every 8 (Eight) Hours As Needed for Mild Pain. (Patient not taking: Reported on 2/13/2025)      apixaban (ELIQUIS) 5 MG tablet tablet Take 1 tablet by mouth 2 (Two) Times a Day. (Patient not taking: Reported on 2/13/2025)      Cholecalciferol 50 MCG (2000 UT) capsule Take 1 capsule by mouth Daily.       No facility-administered medications prior to visit.     No opioid medication identified on active medication list. I have reviewed chart for other potential  high risk medication/s and harmful drug interactions in the elderly.      Aspirin is not on active medication list.  Aspirin use is not indicated based on review of current medical condition/s. Risk of harm outweighs potential benefits.  " .    Patient Active Problem List   Diagnosis    Bladder retention    Hernia, inguinal, right    Cervical disc disorder with radiculopathy of mid-cervical region    Degeneration of intervertebral disc of mid-cervical region    Herniated cervical disc    Primary hypertension    Type 2 diabetes mellitus with hypoglycemia without coma    Macular degeneration    Hyperlipidemia    S/P CABG x 3    S/P PTCA (percutaneous transluminal coronary angioplasty)    Paroxysmal atrial fibrillation    Bell palsy    NSTEMI (non-ST elevated myocardial infarction)    S/P drug eluting coronary stent placement    Clinical diagnosis of COVID-19     Advance Care Planning Advance Directive is not on file.  ACP discussion was held with the patient during this visit. Patient has an advance directive (not in EMR), copy requested.            Objective   Vitals:    02/13/25 1145   BP: 104/54   BP Location: Left arm   Patient Position: Sitting   Pulse: 78   Temp: 97.3 °F (36.3 °C)   TempSrc: Oral   SpO2: 99%   Weight: 81.5 kg (179 lb 11.2 oz)   Height: 177.8 cm (70\")   PainSc: 0-No pain       Estimated body mass index is 25.78 kg/m² as calculated from the following:    Height as of this encounter: 177.8 cm (70\").    Weight as of this encounter: 81.5 kg (179 lb 11.2 oz).    BMI is >= 25 and <30. (Overweight) The following options were offered after discussion;: exercise counseling/recommendations and nutrition counseling/recommendations           Does the patient have evidence of cognitive impairment? No  Lab Results   Component Value Date    CHLPL 110 (L) 01/07/2025    HDL 32 (L) 01/07/2025    LDL 45.6 01/07/2025                                                                                               Health  Risk Assessment    Smoking Status:  Social History     Tobacco Use   Smoking Status Never   Smokeless Tobacco Never     Alcohol Consumption:  Social History     Substance and Sexual Activity   Alcohol Use No       Fall Risk " Screen  STEADI Fall Risk Assessment was completed, and patient is at MODERATE risk for falls. Assessment completed on:2025    Depression Screening   Little interest or pleasure in doing things? Not at all   Feeling down, depressed, or hopeless? Not at all   PHQ-2 Total Score 0      Health Habits and Functional and Cognitive Screenin/13/2025    11:39 AM   Functional & Cognitive Status   Do you have difficulty preparing food and eating? No   Do you have difficulty bathing yourself, getting dressed or grooming yourself? No   Do you have difficulty using the toilet? No   Do you have difficulty moving around from place to place? No   Do you have trouble with steps or getting out of a bed or a chair? No   Current Diet Diabetic Diet   Dental Exam Up to date   Eye Exam Up to date   Exercise (times per week) 0 times per week   Current Exercises Include Walking   Do you need help using the phone?  No   Are you deaf or do you have serious difficulty hearing?  No   Do you need help to go to places out of walking distance? No   Do you need help shopping? No   Do you need help preparing meals?  No   Do you need help with housework?  No   Do you need help with laundry? No   Do you need help taking your medications? No   Do you need help managing money? No   Do you ever drive or ride in a car without wearing a seat belt? No   Have you felt unusual stress, anger or loneliness in the last month? No   Who do you live with? Spouse   If you need help, do you have trouble finding someone available to you? No   Have you been bothered in the last four weeks by sexual problems? No   Do you have difficulty concentrating, remembering or making decisions? No           Age-appropriate Screening Schedule:  Refer to the list below for future screening recommendations based on patient's age, sex and/or medical conditions. Orders for these recommended tests are listed in the plan section. The patient has been provided with a written  plan.    Health Maintenance List  Health Maintenance   Topic Date Due    ZOSTER VACCINE (1 of 2) Never done    Pneumococcal Vaccine 50+ (2 of 2 - PCV) 02/25/2016    RSV Vaccine - Adults (1 - 1-dose 75+ series) Never done    BMI FOLLOWUP  03/28/2018    TDAP/TD VACCINES (2 - Td or Tdap) 01/01/2024    COVID-19 Vaccine (4 - 2024-25 season) 09/01/2024    ANNUAL WELLNESS VISIT  01/31/2025    HEMOGLOBIN A1C  07/07/2025    LIPID PANEL  01/07/2026    DIABETIC EYE EXAM  01/13/2026    COLORECTAL CANCER SCREENING  06/28/2029    INFLUENZA VACCINE  Completed    URINE MICROALBUMIN-CREATININE RATIO (uACR)  Discontinued                                                                                                                                                CMS Preventative Services Quick Reference  Risk Factors Identified During Encounter  None Identified    The above risks/problems have been discussed with the patient.  Pertinent information has been shared with the patient in the After Visit Summary.  An After Visit Summary and PPPS were made available to the patient.    Follow Up:   Next Medicare Wellness visit to be scheduled in 1 year.     Assessment & Plan  Paroxysmal atrial fibrillation         Medicare annual wellness visit, subsequent         Primary hypertension                Follow Up:   No follow-ups on file.

## 2025-03-24 RX ORDER — LEVOTHYROXINE SODIUM 50 UG/1
50 TABLET ORAL EVERY MORNING
Qty: 90 TABLET | Refills: 1 | Status: SHIPPED | OUTPATIENT
Start: 2025-03-24

## 2025-03-24 RX ORDER — FUROSEMIDE 40 MG/1
40 TABLET ORAL DAILY
Qty: 90 TABLET | Refills: 1 | Status: SHIPPED | OUTPATIENT
Start: 2025-03-24

## 2025-04-16 ENCOUNTER — TELEPHONE (OUTPATIENT)
Dept: INTERNAL MEDICINE | Facility: CLINIC | Age: 85
End: 2025-04-16
Payer: MEDICARE

## 2025-05-07 DIAGNOSIS — Z12.11 COLON CANCER SCREENING: ICD-10-CM

## 2025-05-07 DIAGNOSIS — E78.5 HYPERLIPIDEMIA, UNSPECIFIED HYPERLIPIDEMIA TYPE: ICD-10-CM

## 2025-05-07 DIAGNOSIS — I10 PRIMARY HYPERTENSION: ICD-10-CM

## 2025-05-07 DIAGNOSIS — E11.69 TYPE 2 DIABETES MELLITUS WITH OTHER SPECIFIED COMPLICATION, UNSPECIFIED WHETHER LONG TERM INSULIN USE: ICD-10-CM

## 2025-05-07 DIAGNOSIS — I48.0 PAROXYSMAL ATRIAL FIBRILLATION: ICD-10-CM

## 2025-05-09 LAB
ALBUMIN SERPL-MCNC: 4.5 G/DL (ref 3.5–5.2)
ALBUMIN/GLOB SERPL: 2.1 G/DL
ALP SERPL-CCNC: 90 U/L (ref 39–117)
ALT SERPL-CCNC: 24 U/L (ref 1–41)
AST SERPL-CCNC: 26 U/L (ref 1–40)
BASOPHILS # BLD AUTO: 0.03 10*3/MM3 (ref 0–0.2)
BASOPHILS NFR BLD AUTO: 0.5 % (ref 0–1.5)
BILIRUB SERPL-MCNC: 1.2 MG/DL (ref 0–1.2)
BUN SERPL-MCNC: 31 MG/DL (ref 8–23)
BUN/CREAT SERPL: 16.7 (ref 7–25)
CALCIUM SERPL-MCNC: 9.6 MG/DL (ref 8.6–10.5)
CHLORIDE SERPL-SCNC: 104 MMOL/L (ref 98–107)
CHOLEST SERPL-MCNC: 111 MG/DL (ref 0–200)
CHOLEST/HDLC SERPL: 3.26 {RATIO}
CO2 SERPL-SCNC: 29.3 MMOL/L (ref 22–29)
CREAT SERPL-MCNC: 1.86 MG/DL (ref 0.76–1.27)
EGFRCR SERPLBLD CKD-EPI 2021: 35.5 ML/MIN/1.73
EOSINOPHIL # BLD AUTO: 0.12 10*3/MM3 (ref 0–0.4)
EOSINOPHIL NFR BLD AUTO: 2.1 % (ref 0.3–6.2)
ERYTHROCYTE [DISTWIDTH] IN BLOOD BY AUTOMATED COUNT: 13.6 % (ref 12.3–15.4)
GLOBULIN SER CALC-MCNC: 2.1 GM/DL
GLUCOSE SERPL-MCNC: 134 MG/DL (ref 65–99)
HBA1C MFR BLD: 7.1 % (ref 4.8–5.6)
HCT VFR BLD AUTO: 40.7 % (ref 37.5–51)
HDLC SERPL-MCNC: 34 MG/DL (ref 40–60)
HGB BLD-MCNC: 13.2 G/DL (ref 13–17.7)
IMM GRANULOCYTES # BLD AUTO: 0.01 10*3/MM3 (ref 0–0.05)
IMM GRANULOCYTES NFR BLD AUTO: 0.2 % (ref 0–0.5)
LDLC SERPL CALC-MCNC: 63 MG/DL (ref 0–100)
LYMPHOCYTES # BLD AUTO: 0.87 10*3/MM3 (ref 0.7–3.1)
LYMPHOCYTES NFR BLD AUTO: 15.3 % (ref 19.6–45.3)
MCH RBC QN AUTO: 29.7 PG (ref 26.6–33)
MCHC RBC AUTO-ENTMCNC: 32.4 G/DL (ref 31.5–35.7)
MCV RBC AUTO: 91.7 FL (ref 79–97)
MONOCYTES # BLD AUTO: 0.45 10*3/MM3 (ref 0.1–0.9)
MONOCYTES NFR BLD AUTO: 7.9 % (ref 5–12)
NEUTROPHILS # BLD AUTO: 4.2 10*3/MM3 (ref 1.7–7)
NEUTROPHILS NFR BLD AUTO: 74 % (ref 42.7–76)
NRBC BLD AUTO-RTO: 0 /100 WBC (ref 0–0.2)
PLATELET # BLD AUTO: 174 10*3/MM3 (ref 140–450)
POTASSIUM SERPL-SCNC: 4.8 MMOL/L (ref 3.5–5.2)
PROT SERPL-MCNC: 6.6 G/DL (ref 6–8.5)
RBC # BLD AUTO: 4.44 10*6/MM3 (ref 4.14–5.8)
SODIUM SERPL-SCNC: 144 MMOL/L (ref 136–145)
TRIGL SERPL-MCNC: 67 MG/DL (ref 0–150)
TSH SERPL DL<=0.005 MIU/L-ACNC: 2.6 UIU/ML (ref 0.27–4.2)
VLDLC SERPL CALC-MCNC: 14 MG/DL (ref 5–40)
WBC # BLD AUTO: 5.68 10*3/MM3 (ref 3.4–10.8)

## 2025-05-12 RX ORDER — ATORVASTATIN CALCIUM 80 MG/1
80 TABLET, FILM COATED ORAL NIGHTLY
Qty: 90 TABLET | Refills: 1 | Status: SHIPPED | OUTPATIENT
Start: 2025-05-12

## 2025-05-19 ENCOUNTER — OFFICE VISIT (OUTPATIENT)
Dept: INTERNAL MEDICINE | Facility: CLINIC | Age: 85
End: 2025-05-19
Payer: MEDICARE

## 2025-05-19 VITALS
SYSTOLIC BLOOD PRESSURE: 114 MMHG | DIASTOLIC BLOOD PRESSURE: 68 MMHG | TEMPERATURE: 98.1 F | HEART RATE: 75 BPM | WEIGHT: 183.3 LBS | HEIGHT: 70 IN | BODY MASS INDEX: 26.24 KG/M2 | OXYGEN SATURATION: 99 %

## 2025-05-19 DIAGNOSIS — I10 PRIMARY HYPERTENSION: Primary | ICD-10-CM

## 2025-05-19 DIAGNOSIS — Z12.11 COLON CANCER SCREENING: ICD-10-CM

## 2025-05-19 DIAGNOSIS — E11.649 TYPE 2 DIABETES MELLITUS WITH HYPOGLYCEMIA WITHOUT COMA, WITHOUT LONG-TERM CURRENT USE OF INSULIN: ICD-10-CM

## 2025-05-19 DIAGNOSIS — N18.30 CRI (CHRONIC RENAL INSUFFICIENCY), STAGE 3 (MODERATE): ICD-10-CM

## 2025-05-19 DIAGNOSIS — E78.5 HYPERLIPIDEMIA, UNSPECIFIED HYPERLIPIDEMIA TYPE: ICD-10-CM

## 2025-05-19 DIAGNOSIS — I21.4 NSTEMI (NON-ST ELEVATED MYOCARDIAL INFARCTION): ICD-10-CM

## 2025-05-19 DIAGNOSIS — I48.0 PAROXYSMAL ATRIAL FIBRILLATION: ICD-10-CM

## 2025-05-19 PROCEDURE — G2211 COMPLEX E/M VISIT ADD ON: HCPCS | Performed by: INTERNAL MEDICINE

## 2025-05-19 PROCEDURE — 99214 OFFICE O/P EST MOD 30 MIN: CPT | Performed by: INTERNAL MEDICINE

## 2025-05-19 PROCEDURE — 3074F SYST BP LT 130 MM HG: CPT | Performed by: INTERNAL MEDICINE

## 2025-05-19 PROCEDURE — 1159F MED LIST DOCD IN RCRD: CPT | Performed by: INTERNAL MEDICINE

## 2025-05-19 PROCEDURE — 3078F DIAST BP <80 MM HG: CPT | Performed by: INTERNAL MEDICINE

## 2025-05-19 PROCEDURE — 1126F AMNT PAIN NOTED NONE PRSNT: CPT | Performed by: INTERNAL MEDICINE

## 2025-05-19 PROCEDURE — 1160F RVW MEDS BY RX/DR IN RCRD: CPT | Performed by: INTERNAL MEDICINE

## 2025-05-19 NOTE — PROGRESS NOTES
Cece Teixeira is a 84 y.o. male.   Fu with FL   Atrial Fibrillation  Past medical history includes atrial fibrillation and hyperlipidemia.   Diabetes  Hypertension  Hyperlipidemia  Exacerbating diseases include hypothyroidism.   Hypothyroidism  His past medical history is significant for atrial fibrillation and hyperlipidemia.      He is cont to follow with endo for DM  Pt has been taking cholesterol meds as prescribed.  No difficulties with myalgias.   Pt has been taking BP meds as prescribed without any problems.  No HA  No episodes of orthostasis  Pt has been doing well with thyroid meds.  Taking as perscribed without any complications    The following portions of the patient's history were reviewed and updated as appropriate: allergies, current medications, past family history, past medical history, past social history, past surgical history, and problem list.  No tob no etoh  he is trying to walk reg    Review of Systems    Objective   Physical Exam  Vitals reviewed.   Constitutional:       Appearance: He is well-developed.   HENT:      Head: Normocephalic and atraumatic.      Right Ear: External ear normal.      Left Ear: External ear normal.   Eyes:      Conjunctiva/sclera: Conjunctivae normal.      Pupils: Pupils are equal, round, and reactive to light.   Neck:      Thyroid: No thyromegaly.      Trachea: No tracheal deviation.   Cardiovascular:      Rate and Rhythm: Normal rate and regular rhythm.      Heart sounds: Normal heart sounds.   Pulmonary:      Effort: Pulmonary effort is normal.      Breath sounds: Normal breath sounds.   Abdominal:      General: Bowel sounds are normal. There is no distension.      Palpations: Abdomen is soft.      Tenderness: There is no abdominal tenderness.   Musculoskeletal:         General: No deformity. Normal range of motion.      Cervical back: Normal range of motion.   Skin:     General: Skin is warm and dry.   Neurological:      Mental Status: He is alert  and oriented to person, place, and time.   Psychiatric:         Behavior: Behavior normal.         Thought Content: Thought content normal.         Judgment: Judgment normal.         Vitals:    05/19/25 0902   BP: 114/68   Pulse: 75   Temp: 98.1 °F (36.7 °C)   SpO2: 99%     Body mass index is 26.3 kg/m².         Assessment & Plan   Diagnoses and all orders for this visit:    1. Primary hypertension (Primary)    2. NSTEMI (non-ST elevated myocardial infarction)    3. Paroxysmal atrial fibrillation    4. Type 2 diabetes mellitus with hypoglycemia without coma, without long-term current use of insulin    5. CRI (chronic renal insufficiency), stage 3 (moderate)     CAD-  he is stable  no sx  he does follow with cards on imdur and doing well  Anemia--    bethany  for the first time since his bleed  DM-  stable thougha  little high  discussed moving after each meal  CRI-  a little high but he has not had any water   will follow for now    HPL- ok with lipitor  6.  HYpothyrodism-  ok with current meds

## (undated) DEVICE — PK NEURO SPINE 40

## (undated) DEVICE — COLR CERV FM 3IN LG

## (undated) DEVICE — DRP MICROSCP LEICA W/GLASS LENS

## (undated) DEVICE — NDL SPINE 20G 3 1/2 YEL STRL 1P/U

## (undated) DEVICE — 3.0MM NEURO (MATCH HEAD) LESS AGGRESSIVE

## (undated) DEVICE — APPL CHLORAPREP W/TINT 10.5ML PERC STRL

## (undated) DEVICE — 3M™ STERI-STRIP™ REINFORCED ADHESIVE SKIN CLOSURES, R1547, 1/2 IN X 4 IN (12 MM X 100 MM), 6 STRIPS/ENVELOPE: Brand: 3M™ STERI-STRIP™

## (undated) DEVICE — GLV SURG BIOGEL LTX PF 7

## (undated) DEVICE — ENCORE® LATEX ORTHO SIZE 8, STERILE LATEX POWDER-FREE SURGICAL GLOVE: Brand: ENCORE

## (undated) DEVICE — VIOLET BRAIDED (POLYGLACTIN 910), SYNTHETIC ABSORBABLE SUTURE: Brand: COATED VICRYL

## (undated) DEVICE — LIMB HOLDER, WRIST/ANKLE: Brand: DEROYAL

## (undated) DEVICE — DISPOSABLE BIPOLAR FORCEPS 7 3/4" (19.7CM) SCOVILLE BAYONET, 1.5MM TIP AND 12 FT. (3.6M) CABLE: Brand: KIRWAN

## (undated) DEVICE — DRSNG TELFA PAD NONADH STR 1S 3X4IN

## (undated) DEVICE — SMOKE EVACUATION TUBING WITH 7/8 IN TO 1/4 IN REDUCER: Brand: BUFFALO FILTER

## (undated) DEVICE — CONN TBG Y 5 IN 1 LF STRL

## (undated) DEVICE — DRSNG SURESITE WNDW 4X4.5

## (undated) DEVICE — 3M™ STERI-STRIP™ COMPOUND BENZOIN TINCTURE 40 BAGS/CARTON 4 CARTONS/CASE C1544: Brand: 3M™ STERI-STRIP™

## (undated) DEVICE — DRILL BIT 7080510 11 MM DRILL BIT S

## (undated) DEVICE — ANTIBACTERIAL UNDYED BRAIDED (POLYGLACTIN 910), SYNTHETIC ABSORBABLE SUTURE: Brand: COATED VICRYL

## (undated) DEVICE — FLOSEAL MATRIX IS INDICATED IN SURGICAL PROCEDURES (OTHER THAN IN OPHTHALMIC) AS AN ADJUNCT TO HEMOSTASIS WHEN CONTROL OF BLEEDING BY LIGATURE OR CONVENTIONAL PROCEDURES IS INEFFECTIVE OR IMPRACTICAL.: Brand: FLOSEAL HEMOSTATIC MATRIX

## (undated) DEVICE — ADHESIVE ISLAND DRESSING: Brand: TELFA

## (undated) DEVICE — GOWN,NON-REINFORCED,SIRUS,SET IN SLV,XXL: Brand: MEDLINE